# Patient Record
Sex: MALE | Race: WHITE | NOT HISPANIC OR LATINO | URBAN - METROPOLITAN AREA
[De-identification: names, ages, dates, MRNs, and addresses within clinical notes are randomized per-mention and may not be internally consistent; named-entity substitution may affect disease eponyms.]

---

## 2017-01-04 ENCOUNTER — OFFICE VISIT (OUTPATIENT)
Dept: FAMILY MEDICINE | Facility: CLINIC | Age: 57
End: 2017-01-04
Payer: COMMERCIAL

## 2017-01-04 ENCOUNTER — RADIANT APPOINTMENT (OUTPATIENT)
Dept: GENERAL RADIOLOGY | Facility: CLINIC | Age: 57
End: 2017-01-04
Attending: FAMILY MEDICINE
Payer: COMMERCIAL

## 2017-01-04 VITALS
WEIGHT: 249 LBS | BODY MASS INDEX: 36.88 KG/M2 | DIASTOLIC BLOOD PRESSURE: 80 MMHG | HEART RATE: 68 BPM | SYSTOLIC BLOOD PRESSURE: 156 MMHG | TEMPERATURE: 97.1 F | RESPIRATION RATE: 12 BRPM | HEIGHT: 69 IN

## 2017-01-04 DIAGNOSIS — Z71.89 ADVANCED DIRECTIVES, COUNSELING/DISCUSSION: ICD-10-CM

## 2017-01-04 DIAGNOSIS — I87.2 VENOUS STASIS DERMATITIS OF RIGHT LOWER EXTREMITY: Primary | ICD-10-CM

## 2017-01-04 DIAGNOSIS — F17.200 TOBACCO USE DISORDER: ICD-10-CM

## 2017-01-04 DIAGNOSIS — I10 UNCONTROLLED HYPERTENSION: ICD-10-CM

## 2017-01-04 LAB
ALBUMIN SERPL-MCNC: 3.4 G/DL (ref 3.4–5)
ALBUMIN UR-MCNC: NEGATIVE MG/DL
ALP SERPL-CCNC: 90 U/L (ref 40–150)
ALT SERPL W P-5'-P-CCNC: 37 U/L (ref 0–70)
ANION GAP SERPL CALCULATED.3IONS-SCNC: 9 MMOL/L (ref 3–14)
APPEARANCE UR: CLEAR
AST SERPL W P-5'-P-CCNC: 26 U/L (ref 0–45)
BILIRUB SERPL-MCNC: 0.2 MG/DL (ref 0.2–1.3)
BILIRUB UR QL STRIP: NEGATIVE
BUN SERPL-MCNC: 21 MG/DL (ref 7–30)
CALCIUM SERPL-MCNC: 8.6 MG/DL (ref 8.5–10.1)
CHLORIDE SERPL-SCNC: 103 MMOL/L (ref 94–109)
CO2 SERPL-SCNC: 27 MMOL/L (ref 20–32)
COLOR UR AUTO: YELLOW
CREAT SERPL-MCNC: 1.07 MG/DL (ref 0.66–1.25)
ERYTHROCYTE [DISTWIDTH] IN BLOOD BY AUTOMATED COUNT: 12.7 % (ref 10–15)
GFR SERPL CREATININE-BSD FRML MDRD: 71 ML/MIN/1.7M2
GLUCOSE SERPL-MCNC: 89 MG/DL (ref 70–99)
GLUCOSE UR STRIP-MCNC: NEGATIVE MG/DL
HCT VFR BLD AUTO: 42.4 % (ref 40–53)
HGB BLD-MCNC: 13.9 G/DL (ref 13.3–17.7)
HGB UR QL STRIP: NEGATIVE
KETONES UR STRIP-MCNC: NEGATIVE MG/DL
LEUKOCYTE ESTERASE UR QL STRIP: NEGATIVE
MCH RBC QN AUTO: 29.7 PG (ref 26.5–33)
MCHC RBC AUTO-ENTMCNC: 32.8 G/DL (ref 31.5–36.5)
MCV RBC AUTO: 91 FL (ref 78–100)
NITRATE UR QL: NEGATIVE
PH UR STRIP: 7 PH (ref 5–7)
PLATELET # BLD AUTO: 234 10E9/L (ref 150–450)
POTASSIUM SERPL-SCNC: 4.1 MMOL/L (ref 3.4–5.3)
PROT SERPL-MCNC: 6.9 G/DL (ref 6.8–8.8)
RBC # BLD AUTO: 4.68 10E12/L (ref 4.4–5.9)
SODIUM SERPL-SCNC: 139 MMOL/L (ref 133–144)
SP GR UR STRIP: 1.02 (ref 1–1.03)
TSH SERPL DL<=0.005 MIU/L-ACNC: 1.63 MU/L (ref 0.4–4)
URN SPEC COLLECT METH UR: NORMAL
UROBILINOGEN UR STRIP-ACNC: 0.2 EU/DL (ref 0.2–1)
WBC # BLD AUTO: 6.3 10E9/L (ref 4–11)

## 2017-01-04 PROCEDURE — 71020 XR CHEST 2 VW: CPT

## 2017-01-04 PROCEDURE — 99000 SPECIMEN HANDLING OFFICE-LAB: CPT | Performed by: FAMILY MEDICINE

## 2017-01-04 PROCEDURE — 99214 OFFICE O/P EST MOD 30 MIN: CPT | Performed by: FAMILY MEDICINE

## 2017-01-04 PROCEDURE — 85027 COMPLETE CBC AUTOMATED: CPT | Performed by: FAMILY MEDICINE

## 2017-01-04 PROCEDURE — 80053 COMPREHEN METABOLIC PANEL: CPT | Performed by: FAMILY MEDICINE

## 2017-01-04 PROCEDURE — 36415 COLL VENOUS BLD VENIPUNCTURE: CPT | Performed by: FAMILY MEDICINE

## 2017-01-04 PROCEDURE — 93000 ELECTROCARDIOGRAM COMPLETE: CPT | Performed by: FAMILY MEDICINE

## 2017-01-04 PROCEDURE — 84443 ASSAY THYROID STIM HORMONE: CPT | Performed by: FAMILY MEDICINE

## 2017-01-04 PROCEDURE — 81003 URINALYSIS AUTO W/O SCOPE: CPT | Performed by: FAMILY MEDICINE

## 2017-01-04 RX ORDER — HYDROCHLOROTHIAZIDE 12.5 MG/1
12.5 TABLET ORAL DAILY
Qty: 30 TABLET | Refills: 0 | Status: SHIPPED | OUTPATIENT
Start: 2017-01-04 | End: 2017-01-23

## 2017-01-04 NOTE — PATIENT INSTRUCTIONS
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  Schedule ultrasound appointment to study the blood vessels in the legs.  Wear your compression stockings all day and night.  Smoking plays a big role in your condition; stop smoking soon!    Your blood pressure today is uncontrolled.  Due to this medical treatment should be started to prevent complications.  Start hydrochlorothiazide 12.5 mg one tablet daily  Blood, urine, chest xray and EKG tests are ordered as baseline.  Low salt, low fat diet. Exercise as tolerated 30 mins per day 3 days a week.  Take meds as prescribed; call if with side effects.   Follow up in clinic in 2 weeks for blood pressure recheck.    Start Aspirin 81 mg daily.            Thank you for choosing [facility].  You may be receiving a survey in the mail from [employer] regarding your visit today.  Please take a few minutes to complete and return the survey to let us know how we are doing.      If you have questions or concerns, please contact us via [employer] or you can contact your care team at [phone].    Our Clinic hours are:  Monday 6:40 am  to 7:00 pm  Tuesday -Friday 6:40 am to 5:00 pm    The [place] outpatient lab hours are:  Monday - Friday 6:10 am to 4:45 pm  Saturdays 7:00 am to 11:00 am  Appointments are required, call [phone]    If you have clinical questions after hours or would like to schedule an appointment,  call the clinic at [phone].    Patient Education    Hydrochlorothiazide Oral capsule    Hydrochlorothiazide Oral tablet  Hydrochlorothiazide Oral capsule  What is this medicine?  HYDROCHLOROTHIAZIDE (ronny droe klor oh THYE a zide) is a diuretic. It increases the amount of urine passed, which causes the body to lose salt and water. This medicine is used to treat high blood pressure. It is also reduces the swelling and water retention caused by various medical conditions, such as heart, liver, or  kidney disease.  This medicine may be used for other purposes; ask your health care provider or pharmacist if you have questions.  What should I tell my health care provider before I take this medicine?  They need to know if you have any of these conditions:    diabetes    gout    immune system problems, like lupus    kidney disease or kidney stones    liver disease    pancreatitis    small amount of urine or difficulty passing urine    an unusual or allergic reaction to hydrochlorothiazide, sulfa drugs, other medicines, foods, dyes, or preservatives    pregnant or trying to get pregnant    breast-feeding  How should I use this medicine?  Take this medicine by mouth with a glass of water. Follow the directions on the prescription label. Take your medicine at regular intervals. Remember that you will need to pass urine frequently after taking this medicine. Do not take your doses at a time of day that will cause you problems. Do not stop taking your medicine unless your doctor tells you to.  Talk to your pediatrician regarding the use of this medicine in children. Special care may be needed.  Overdosage: If you think you have taken too much of this medicine contact a poison control center or emergency room at once.  NOTE: This medicine is only for you. Do not share this medicine with others.  What if I miss a dose?  If you miss a dose, take it as soon as you can. If it is almost time for your next dose, take only that dose. Do not take double or extra doses.  What may interact with this medicine?    cholestyramine    colestipol    digoxin    dofetilide    lithium    medicines for blood pressure    medicines for diabetes    medicines that relax muscles for surgery    other diuretics    steroid medicines like prednisone or cortisone  This list may not describe all possible interactions. Give your health care provider a list of all the medicines, herbs, non-prescription drugs, or dietary supplements you use. Also tell  them if you smoke, drink alcohol, or use illegal drugs. Some items may interact with your medicine.  What should I watch for while using this medicine?  Visit your doctor or health care professional for regular checks on your progress. Check your blood pressure as directed. Ask your doctor or health care professional what your blood pressure should be and when you should contact him or her.  You may need to be on a special diet while taking this medicine. Ask your doctor.  Check with your doctor or health care professional if you get an attack of severe diarrhea, nausea and vomiting, or if you sweat a lot. The loss of too much body fluid can make it dangerous for you to take this medicine.  You may get drowsy or dizzy. Do not drive, use machinery, or do anything that needs mental alertness until you know how this medicine affects you. Do not stand or sit up quickly, especially if you are an older patient. This reduces the risk of dizzy or fainting spells. Alcohol may interfere with the effect of this medicine. Avoid alcoholic drinks.  This medicine may affect your blood sugar level. If you have diabetes, check with your doctor or health care professional before changing the dose of your diabetic medicine.  This medicine can make you more sensitive to the sun. Keep out of the sun. If you cannot avoid being in the sun, wear protective clothing and use sunscreen. Do not use sun lamps or tanning beds/booths.  What side effects may I notice from receiving this medicine?  Side effects that you should report to your doctor or health care professional as soon as possible:    allergic reactions such as skin rash or itching, hives, swelling of the lips, mouth, tongue, or throat    changes in vision    chest pain    eye pain    fast or irregular heartbeat    feeling faint or lightheaded, falls    gout attack    muscle pain or cramps    pain or difficulty when passing urine    pain, tingling, numbness in the hands or  feet    redness, blistering, peeling or loosening of the skin, including inside the mouth    unusually weak or tired  Side effects that usually do not require medical attention (report to your doctor or health care professional if they continue or are bothersome):    change in sex drive or performance    dry mouth    headache    stomach upset  This list may not describe all possible side effects. Call your doctor for medical advice about side effects. You may report side effects to FDA at 5-519-OES-7342.  Where should I keep my medicine?  Keep out of the reach of children.  Store at room temperature between 15 and 30 degrees C (59 and 86 degrees F). Do not freeze. Protect from light and moisture. Keep container closed tightly. Throw away any unused medicine after the expiration date.  NOTE:This sheet is a summary. It may not cover all possible information. If you have questions about this medicine, talk to your doctor, pharmacist, or health care provider. Copyright  2016 Gold Standard        Low-Salt Diet (2 Grams/Day)  This diet eliminates foods that are high in salt and restricts the amount of salt that you cook with. It is most often used for patients with high blood pressure, edema (fluid retention), kidney, liver, and heart disease.  Table salt contains the mineral sodium. The body needs sodium to work normally. But too much sodium can make your health problems worse. Your health care provider is recommending a low-salt (also called low-sodium) diet for you. Your total daily allowance of salt (sodium) is 2 grams. This equals 2,000 milligrams (mg). It is less than 1 teaspoon of table salt. This means you can have only about 700 mg of sodium at each meal.  When you cook, limit the salt you use. And if you can avoid using salt, even better. Do not add salt at the table. So, throw away the saltshaker!  When shopping, read the package labels. Salt is often called  sodium  on the label. Choose foods that are  salt-free, low salt, or very low salt. Note that foods with reduced salt or reduced sodium may not lower your salt intake enough.    Beverages  Ok: Tea, coffee, carbonated beverages, juices  Avoid: Flavored international coffees, electrolyte replacement drinks, sports beverages  Bread and cereals  Ok: Low sodium bread and rolls, cereals, cakes; low-salt crackers, matzo crackers  Avoid: Salted crackers, pretzels, popcorn; Bulgarian toast, pancakes, muffins  Desserts  Ok: Ice cream, frozen yogurt, juice bars, gelatin, cookies and pies, sugar, honey, jelly, hard candy  Avoid: Most pies, cakes and cookies prepared or processed with salt, instant pudding  Meats  Ok: All fresh meat, fish, poultry, low-salt tuna, eggs, egg substitute  Avoid: Smoked, pickled, brine-cured, or salted meats and fish. This includes tian, chipped beef, corned beef, hot dogs, luncheon meats, ham, kosher meats, salt pork, sausage, canned tuna, salted codfish, smoked salmon, herring, sardines, or anchovies.  Dairy  Ok: Milk, chocolate milk, hot chocolate mix,  low-salt  cheeses, and yogurt  Avoid: Processed cheese, cheese spreads, Roquefort, Camembert, and cottage cheese, buttermilk, instant breakfast drink  Beans, potatoes, and pasta  Ok: Dry beans, split peas, lentils, potatoes, rice, macaroni, pasta, spaghetti without added salt  Avoid: Potato chips, tortilla chips, and similar products  Soups  Ok: Low-salt soups and broths made with allowed foods  Avoid: Bouillon cubes, soups with smoked or salted meats, regular soup and broth  Vegetables  Ok: Most are okay; low-salt tomato and vegetable juices  Avoid: Sauerkraut and other brine-soaked vegetables, pickles and other pickled vegetables, tomato juice, olives  Seasoning and spices  Ok: Most seasonings are okay. Good substitutes for salt include: fresh herb blends, hot sauce, lemon, garlic, harrison, vinegar, dry mustard, parsley, cilantro, horseradish, tomato paste, regular margarine, mayonnaise,  butter, cream cheese, vegetable oil, cream, low-salt salad dressing and gravy  Avoid: Regular ketchup, relishes, pickles, soy sauce, teriyaki sauce, Worcestershire sauce, BBQ sauce, tartar sauce, meat tenderizer, chili sauce, regular gravy, regular salad dressing    9777-5582 [employer]. [address] [place] [address], [place] [address]. All rights reserved. This information is not intended as a substitute for professional medical care. Always follow your healthcare professional's instructions.

## 2017-01-04 NOTE — MR AVS SNAPSHOT
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  Schedule ultrasound appointment to study the blood vessels in the legs.  Wear your compression stockings all day and night.  Smoking plays a big role in your condition; stop smoking soon!    Your blood pressure today is uncontrolled.  Due to this medical treatment should be started to prevent complications.  Start hydrochlorothiazide 12.5 mg one tablet daily  Blood, urine, chest xray and EKG tests are ordered as baseline.  Low salt, low fat diet. Exercise as tolerated 30 mins per day 3 days a week.  Take meds as prescribed; call if with side effects.   Follow up in clinic in 2 weeks for blood pressure recheck.    Start Aspirin 81 mg daily.            Thank you for choosing [facility].  You may be receiving a survey in the mail from [facility] regarding your visit today.  Please take a few minutes to complete and return the survey to let us know how we are doing.      If you have questions or concerns, please contact us via [employer] or you can contact your care team at [phone].    Our Clinic hours are:  Monday 6:40 am  to 7:00 pm  Tuesday -Friday 6:40 am to 5:00 pm    The [place] outpatient lab hours are:  Monday - Friday 6:10 am to 4:45 pm  Saturdays 7:00 am to 11:00 am  Appointments are required, call [phone]    If you have clinical questions after hours or would like to schedule an  appointment,  call the clinic at [phone].          Follow-ups after your visit        Additional Services     PHYSICAL THERAPY REFERRAL       *This therapy referral will be filtered to a centralized scheduling office at [facility] and the patient will receive a call to schedule an appointment at a [place] location most convenient for them. *     [facility] provides Physical Therapy evaluation and treatment and many specialty services across the [place] system.  If requesting a specialty program, please choose from the list below.    If you have not heard from the scheduling office within 2 business days, please call [phone] for all locations, with the exception of [place], please call [phone].  Treatment: Evaluation & Treatment  Special Instructions/Modalities: treat chronic venous stasis changes and lymphedema on BLE.  Special Programs: [facility]    Please be aware that coverage of these services is subject to the terms and limitations of your health insurance plan.  Call member services at your health plan with any benefit or coverage questions.      **Note to Provider:  If you are referring outside of [place] for the therapy appointment, please list the name of the location in the  special instructions  above, print the referral and give to the patient to schedule the appointment.                  Future tests that were ordered for you today     Open Future Orders        Priority Expected Expires Ordered    US Venous Competency Bilateral Routine  1/4/2018 1/4/2017    US KAMILLE Doppler No Exercise Routine  1/4/2018 1/4/2017            Who to contact     If you have questions or need follow up information about today's clinic visit or your schedule please contact [facility] directly at [phone].  Normal or non-critical lab and imaging results will be communicated to you by MyChart, letter or phone within 4 business days after  "the clinic has received the results. If you do not hear from us within 7 days, please contact the clinic through [employer] or phone. If you have a critical or abnormal lab result, we will notify you by phone as soon as possible.  Submit refill requests through [employer] or call your pharmacy and they will forward the refill request to us. Please allow 3 business days for your refill to be completed.          Additional Information About Your Visit        [employer] Information     [employer] lets you send messages to your doctor, view your test results, renew your prescriptions, schedule appointments and more. To sign up, go to [email or web address] . Click on \"Log in\" on the left side of the screen, which will take you to the Welcome page. Then click on \"Sign up Now\" on the right side of the page.     You will be asked to enter the access code listed below, as well as some personal information. Please follow the directions to create your username and password.     Your access code is: [email or web address]  Expires: 2017  9:49 AM     Your access code will  in 90 days. If you need help or a new code, please call your [place] clinic or [phone].        Care EveryWhere ID     This is your Care EveryWhere ID. This could be used by other organizations to access your [place] medical records  [id number]        Your Vitals Were     Pulse Temperature Respirations Height BMI (Body Mass Index)       68 97.1  F (36.2  C) (Tympanic) 12 5' 9\" (1.753 m) 36.75 kg/m2        Blood Pressure from Last 3 Encounters:   17 156/80   10/24/16 136/88   10/14/16 138/80    Weight from Last 3 Encounters:   17 249 lb (112.946 kg)   10/24/16 246 lb (111.585 kg)   10/14/16 250 lb (113.399 kg)              We Performed the Following     *UA reflex to Microscopic     CBC with platelets     Comprehensive metabolic panel     EKG 12-lead complete w/read - Clinics     PHYSICAL THERAPY REFERRAL     TSH with free T4 reflex     XR Chest 2 " Views          Today's Medication Changes          These changes are accurate as of: 1/4/17  9:49 AM.  If you have any questions, ask your nurse or doctor.               Start taking these medicines.        Dose/Directions    aspirin 81 MG tablet   Used for:  Uncontrolled hypertension, Tobacco use disorder   Started by:  Bean Whalen MD        Dose:  81 mg   Take 1 tablet (81 mg) by mouth daily   Quantity:  90 tablet   Refills:  3       hydrochlorothiazide 12.5 MG Tabs tablet   Used for:  Uncontrolled hypertension   Started by:  Bean Whalen MD        Dose:  12.5 mg   Take 1 tablet (12.5 mg) by mouth daily   Quantity:  30 tablet   Refills:  0         Stop taking these medicines if you haven't already. Please contact your care team if you have questions.      MG tablet   Generic drug:  ibuprofen   Stopped by:  Bean Whalen MD                Where to get your medicines      These medications were sent to [facility] [address] [facility]  [address] [facility] [address]     Phone:  [phone]    - aspirin 81 MG tablet  - hydrochlorothiazide 12.5 MG Tabs tablet             Primary Care Provider Office Phone # Fax #    Liborio Mercer [phone]       [address] [address]        Thank you!     Thank you for choosing [facility]  for your care. Our goal is always to provide you with excellent care. Hearing back from our patients is one way we can continue to improve our services. Please take a few minutes to complete the written survey that you may receive in the mail after your visit with us. Thank you!             Your Updated Medication List - Protect others around you: Learn how to safely use, store and throw away your medicines at www.disposemymeds.org.          This list is accurate as of: 1/4/17  9:49 AM.  Always use your most recent med list.                    Brand Name Dispense Instructions for use    aspirin 81 MG tablet     90 tablet    Take 1 tablet (81 mg) by mouth daily       hydrochlorothiazide 12.5 MG Tabs tablet     30 tablet    Take 1 tablet (12.5 mg) by mouth daily       order for DME     1 Units    Equipment being ordered: compression stockings above knee 1 pair

## 2017-01-04 NOTE — NURSING NOTE
"Chief Complaint   Patient presents with     Infection     Infection right lower leg, cellulitis 10/2016, past couple of weeks starting getting worse again, red, inflammed, warm to the touch, swollen, does have sore on left leg, burning sensation occasionally at site of sore.  ? left leg infection also.  No known fever.  Does wear his compression stockings occasioanlly.       Initial /82 mmHg  Pulse 68  Temp(Src) 97.1  F (36.2  C) (Tympanic)  Ht 5' 9\" (1.753 m)  Wt 249 lb (112.946 kg)  BMI 36.75 kg/m2 Estimated body mass index is 36.75 kg/(m^2) as calculated from the following:    Height as of this encounter: 5' 9\" (1.753 m).    Weight as of this encounter: 249 lb (112.946 kg).  BP completed using cuff size: large  "

## 2017-01-04 NOTE — PROGRESS NOTES
"  SUBJECTIVE:                                                    Liborio Medellin is a 56 year old male who presents to clinic today for the following health issues:      Chief Complaint   Patient presents with     Infection     Infection right lower leg, cellulitis 10/2016, past couple of weeks starting getting worse again, red, inflammed, warm to the touch, swollen, does have sore on left leg, burning sensation occasionally at site of sore.  ? left leg infection also.  No known fever.  Does wear his compression stockings occasioanlly.     Health Maintenance     colonosocpy order placed in Oct, patient does have prep         Musculoskeletal problem/pain      Duration: 3 months    Description  Location: right lower leg  Treated for cellulitis in 9/2016 into 10/2016.  Patient states the redness and the open sores on the leg improved but one sore persisted since then.  The redness of skin was never completely resolved.  Patient has been wearing compression stockings intermittently since then.  3 weeks ago, patient has started to have \"more swelling and more redness\" on the right lower leg.  Patient denies fever, chills or left leg symptoms.    Intensity:  mild, moderate    Accompanying signs and symptoms: 'burning sensation just where the sore is\" on the RLE    History  Previous similar problem: YES- see above  Previous evaluation:  No imaging to date    Precipitating or alleviating factors:  Trauma or overuse: no   Aggravating factors include: none    Therapies tried and outcome: in last 3 weeks, no specific treatments.   Verified above history with patient.      Problem list and histories reviewed & adjusted, as indicated.  Additional history: as documented    Patient Active Problem List   Diagnosis     Cellulitis due to MRSA     Advanced directives, counseling/discussion     Tobacco use disorder     Uncontrolled hypertension     History reviewed. No pertinent past surgical history.    Social History   Substance " "Use Topics     Smoking status: Current Some Day Smoker     Types: Cigarettes     Smokeless tobacco: Not on file      Comment: Less than 1 pp week.     Alcohol Use: Yes      Comment: Off and on-weekends.     Family History   Problem Relation Age of Onset     Coronary Artery Disease Father      bypass     GASTROINTESTINAL DISEASE Father      Other Cancer Maternal Grandmother      Coronary Artery Disease Paternal Grandmother          Current Outpatient Prescriptions   Medication Sig Dispense Refill     hydrochlorothiazide 12.5 MG TABS tablet Take 1 tablet (12.5 mg) by mouth daily 30 tablet 0     aspirin 81 MG tablet Take 1 tablet (81 mg) by mouth daily 90 tablet 3     order for DME Equipment being ordered: compression stockings above knee 1 pair 1 Units 1     No Known Allergies    ROS:  C: NEGATIVE for fever, chills, change in weight  INTEGUMENTARY/SKIN: see above  R: NEGATIVE for significant cough or SOB  CV: NEGATIVE for chest pain, palpitations or peripheral edema  MUSCULOSKELETAL:see above.  N: NEGATIVE for weakness, dizziness or paresthesias  H: NEGATIVE for bleeding problems    OBJECTIVE:                                                    /80 mmHg  Pulse 68  Temp(Src) 97.1  F (36.2  C) (Tympanic)  Resp 12  Ht 5' 9\" (1.753 m)  Wt 249 lb (112.946 kg)  BMI 36.75 kg/m2  Body mass index is 36.75 kg/(m^2).  GENERAL: obese, alert and no distress, ambulatory w/o assist  NECK: no tenderness, no adenopathy,  Thyroid not enlarged  RESP: lungs clear to auscultation - no rales, no rhonchi, no wheezes  CV: regular rates and rhythm, no murmur  MS: bilateral mildly pitting edema up to distal 3rd of lower leg; right lower leg with anterolateral dry non-oozing irregular sized superficial ulcer with surrounding irregular shaped erythema with no warmth to touch nor tenderness on palpation; no Yuly's sign either side.  SKIN: see above; no skin changes on the left leg; no other rash/ulcer  ABD: protuberant,  " nontender  PERIPHERAL PULSES: right pedal pulse grade 1, left pedal pulse 2+    Diagnostic test results:  Diagnostic Test Results:  none      ASSESSMENT/PLAN:                                                        ICD-10-CM    1. Venous stasis dermatitis of right lower extremity I83.11 US Venous Competency Bilateral     US KAMILLE Doppler No Exercise     PHYSICAL THERAPY REFERRAL  Discussed with patient exam today consistent with venous stasis changes rather than cellulitis.  Discussed evaluation; patient concurred.  Lymphedema clinic referral.  Compression stockings to both legs daily.  Low salt diet to prevent further edema.  Return precautions discussed and given to patient.     2. Uncontrolled hypertension I10 hydrochlorothiazide 12.5 MG TABS tablet     Comprehensive metabolic panel     TSH with free T4 reflex     CBC with platelets     EKG 12-lead complete w/read - Clinics     XR Chest 2 Views     *UA reflex to Microscopic     aspirin 81 MG tablet  Discussed causes, course, complications and treatment of condition.  Low salt, low fat diet.   Exercise as tolerated 30 mins per day 3 days a week to start with.  Counseled on different meds; patient agreed to above recommended med.  Take meds as prescribed; call if with side effects. .  Return precautions given.     3. Tobacco use disorder F17.200 aspirin 81 MG tablet  Advised risks of habit, including aggravation or causing the above conditions.  Advised patient to stop soon; he is not interested at this time.     4. 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  Schedule ultrasound appointment to study the blood vessels in the legs.  Wear your compression stockings all day and night.  Smoking plays a big role in your condition; stop smoking soon!    Your  blood pressure today is uncontrolled.  Due to this medical treatment should be started to prevent complications.  Start hydrochlorothiazide 12.5 mg one tablet daily  Blood, urine, chest xray and EKG tests are ordered as baseline.  Low salt, low fat diet. Exercise as tolerated 30 mins per day 3 days a week.  Take meds as prescribed; call if with side effects.   Follow up in clinic in 2 weeks for blood pressure recheck.    Start Aspirin 81 mg daily.            Thank you for choosing [facility].  You may be receiving a survey in the mail from [employer]neud[employer] Kapoor regarding your visit today.  Please take a few minutes to complete and return the survey to let us know how we are doing.      If you have questions or concerns, please contact us via [facility] [employer] or you can contact your care team at [phone].    Our Clinic hours are:  Monday 6:40 am  to 7:00 pm  Tuesday -Friday 6:40 am to 5:00 pm    The [place] outpatient lab hours are:  Monday - Friday 6:10 am to 4:45 pm  Saturdays 7:00 am to 11:00 am  Appointments are required, call [phone]    If you have clinical questions after hours or would like to schedule an appointment,  call the clinic at [phone].    Patient Education    Hydrochlorothiazide Oral capsule    Hydrochlorothiazide Oral tablet  Hydrochlorothiazide Oral capsule  What is this medicine?  HYDROCHLOROTHIAZIDE (ronny droe klor oh THYE a zide) is a diuretic. It increases the amount of urine passed, which causes the body to lose salt and water. This medicine is used to treat high blood pressure. It is also reduces the swelling and water retention caused by various medical conditions, such as heart, liver, or kidney disease.  This medicine may be used for other purposes; ask your health care provider or pharmacist if you have questions.  What should I tell my health care provider before I take this medicine?  They need to know if you have any of these conditions:    diabetes    gout    immune system problems,  like lupus    kidney disease or kidney stones    liver disease    pancreatitis    small amount of urine or difficulty passing urine    an unusual or allergic reaction to hydrochlorothiazide, sulfa drugs, other medicines, foods, dyes, or preservatives    pregnant or trying to get pregnant    breast-feeding  How should I use this medicine?  Take this medicine by mouth with a glass of water. Follow the directions on the prescription label. Take your medicine at regular intervals. Remember that you will need to pass urine frequently after taking this medicine. Do not take your doses at a time of day that will cause you problems. Do not stop taking your medicine unless your doctor tells you to.  Talk to your pediatrician regarding the use of this medicine in children. Special care may be needed.  Overdosage: If you think you have taken too much of this medicine contact a poison control center or emergency room at once.  NOTE: This medicine is only for you. Do not share this medicine with others.  What if I miss a dose?  If you miss a dose, take it as soon as you can. If it is almost time for your next dose, take only that dose. Do not take double or extra doses.  What may interact with this medicine?    cholestyramine    colestipol    digoxin    dofetilide    lithium    medicines for blood pressure    medicines for diabetes    medicines that relax muscles for surgery    other diuretics    steroid medicines like prednisone or cortisone  This list may not describe all possible interactions. Give your health care provider a list of all the medicines, herbs, non-prescription drugs, or dietary supplements you use. Also tell them if you smoke, drink alcohol, or use illegal drugs. Some items may interact with your medicine.  What should I watch for while using this medicine?  Visit your doctor or health care professional for regular checks on your progress. Check your blood pressure as directed. Ask your doctor or health care  professional what your blood pressure should be and when you should contact him or her.  You may need to be on a special diet while taking this medicine. Ask your doctor.  Check with your doctor or health care professional if you get an attack of severe diarrhea, nausea and vomiting, or if you sweat a lot. The loss of too much body fluid can make it dangerous for you to take this medicine.  You may get drowsy or dizzy. Do not drive, use machinery, or do anything that needs mental alertness until you know how this medicine affects you. Do not stand or sit up quickly, especially if you are an older patient. This reduces the risk of dizzy or fainting spells. Alcohol may interfere with the effect of this medicine. Avoid alcoholic drinks.  This medicine may affect your blood sugar level. If you have diabetes, check with your doctor or health care professional before changing the dose of your diabetic medicine.  This medicine can make you more sensitive to the sun. Keep out of the sun. If you cannot avoid being in the sun, wear protective clothing and use sunscreen. Do not use sun lamps or tanning beds/booths.  What side effects may I notice from receiving this medicine?  Side effects that you should report to your doctor or health care professional as soon as possible:    allergic reactions such as skin rash or itching, hives, swelling of the lips, mouth, tongue, or throat    changes in vision    chest pain    eye pain    fast or irregular heartbeat    feeling faint or lightheaded, falls    gout attack    muscle pain or cramps    pain or difficulty when passing urine    pain, tingling, numbness in the hands or feet    redness, blistering, peeling or loosening of the skin, including inside the mouth    unusually weak or tired  Side effects that usually do not require medical attention (report to your doctor or health care professional if they continue or are bothersome):    change in sex drive or performance    dry  mouth    headache    stomach upset  This list may not describe all possible side effects. Call your doctor for medical advice about side effects. You may report side effects to FDA at 4-366-DXH-1682.  Where should I keep my medicine?  Keep out of the reach of children.  Store at room temperature between 15 and 30 degrees C (59 and 86 degrees F). Do not freeze. Protect from light and moisture. Keep container closed tightly. Throw away any unused medicine after the expiration date.  NOTE:This sheet is a summary. It may not cover all possible information. If you have questions about this medicine, talk to your doctor, pharmacist, or health care provider. Copyright  2016 [employer] Standard        Low-Salt Diet (2 Grams/Day)  This diet eliminates foods that are high in salt and restricts the amount of salt that you cook with. It is most often used for patients with high blood pressure, edema (fluid retention), kidney, liver, and heart disease.  Table salt contains the mineral sodium. The body needs sodium to work normally. But too much sodium can make your health problems worse. Your health care provider is recommending a low-salt (also called low-sodium) diet for you. Your total daily allowance of salt (sodium) is 2 grams. This equals 2,000 milligrams (mg). It is less than 1 teaspoon of table salt. This means you can have only about 700 mg of sodium at each meal.  When you cook, limit the salt you use. And if you can avoid using salt, even better. Do not add salt at the table. So, throw away the saltshaker!  When shopping, read the package labels. Salt is often called  sodium  on the label. Choose foods that are salt-free, low salt, or very low salt. Note that foods with reduced salt or reduced sodium may not lower your salt intake enough.    Beverages  Ok: Tea, coffee, carbonated beverages, juices  Avoid: Flavored international coffees, electrolyte replacement drinks, sports beverages  Bread and cereals  Ok: Low sodium  bread and rolls, cereals, cakes; low-salt crackers, matzo crackers  Avoid: Salted crackers, pretzels, popcorn; Romanian toast, pancakes, muffins  Desserts  Ok: Ice cream, frozen yogurt, juice bars, gelatin, cookies and pies, sugar, honey, jelly, hard candy  Avoid: Most pies, cakes and cookies prepared or processed with salt, instant pudding  Meats  Ok: All fresh meat, fish, poultry, low-salt tuna, eggs, egg substitute  Avoid: Smoked, pickled, brine-cured, or salted meats and fish. This includes tian, chipped beef, corned beef, hot dogs, luncheon meats, ham, kosher meats, salt pork, sausage, canned tuna, salted codfish, smoked salmon, herring, sardines, or anchovies.  Dairy  Ok: Milk, chocolate milk, hot chocolate mix,  low-salt  cheeses, and yogurt  Avoid: Processed cheese, cheese spreads, Roquefort, Camembert, and cottage cheese, buttermilk, instant breakfast drink  Beans, potatoes, and pasta  Ok: Dry beans, split peas, lentils, potatoes, rice, macaroni, pasta, spaghetti without added salt  Avoid: Potato chips, tortilla chips, and similar products  Soups  Ok: Low-salt soups and broths made with allowed foods  Avoid: Bouillon cubes, soups with smoked or salted meats, regular soup and broth  Vegetables  Ok: Most are okay; low-salt tomato and vegetable juices  Avoid: Sauerkraut and other brine-soaked vegetables, pickles and other pickled vegetables, tomato juice, olives  Seasoning and spices  Ok: Most seasonings are okay. Good substitutes for salt include: fresh herb blends, hot sauce, lemon, garlic, harrison, vinegar, dry mustard, parsley, cilantro, horseradish, tomato paste, regular margarine, mayonnaise, butter, cream cheese, vegetable oil, cream, low-salt salad dressing and gravy  Avoid: Regular ketchup, relishes, pickles, soy sauce, teriyaki sauce, Worcestershire sauce, Q sauce, tartar sauce, meat tenderizer, chili sauce, regular gravy, regular salad dressing    9556-2226 [employer]. [address] [place] [address]  [place] [address]. All rights reserved. This information is not intended as a substitute for professional medical care. Always follow your healthcare professional's instructions.              Bean Whalen MD  [facility]

## 2017-01-23 ENCOUNTER — OFFICE VISIT (OUTPATIENT)
Dept: FAMILY MEDICINE | Facility: CLINIC | Age: 57
End: 2017-01-23
Payer: COMMERCIAL

## 2017-01-23 VITALS
SYSTOLIC BLOOD PRESSURE: 159 MMHG | HEIGHT: 69 IN | DIASTOLIC BLOOD PRESSURE: 90 MMHG | TEMPERATURE: 98.1 F | HEART RATE: 71 BPM | WEIGHT: 245.2 LBS | BODY MASS INDEX: 36.32 KG/M2

## 2017-01-23 DIAGNOSIS — L03.115 CELLULITIS OF RIGHT LOWER LEG: Primary | ICD-10-CM

## 2017-01-23 DIAGNOSIS — I87.2 VENOUS STASIS DERMATITIS OF BOTH LOWER EXTREMITIES: ICD-10-CM

## 2017-01-23 DIAGNOSIS — I10 UNCONTROLLED HYPERTENSION: ICD-10-CM

## 2017-01-23 PROCEDURE — 99214 OFFICE O/P EST MOD 30 MIN: CPT | Performed by: FAMILY MEDICINE

## 2017-01-23 RX ORDER — CLINDAMYCIN HCL 300 MG
300 CAPSULE ORAL 4 TIMES DAILY
Qty: 28 CAPSULE | Refills: 0 | Status: SHIPPED | OUTPATIENT
Start: 2017-01-23 | End: 2017-01-30

## 2017-01-23 RX ORDER — HYDROCHLOROTHIAZIDE 25 MG/1
25 TABLET ORAL DAILY
Qty: 30 TABLET | Refills: 0 | Status: SHIPPED | OUTPATIENT
Start: 2017-01-23 | End: 2017-11-14

## 2017-01-23 NOTE — MR AVS SNAPSHOT
After Visit Summary   1/23/2017    Liborio Medellin    MRN: [id number]           Patient Information     Date Of Birth          1960        Visit Information        Provider Department      1/23/2017 6:00 PM Bean Whalen MD [facility]        Today's Diagnoses     Cellulitis of right lower leg    -  1     Uncontrolled hypertension           Care Instructions    Start clindamycin 300 mg 4 x a day for at least 7 days.  Follow up in clinic in 2-3 days if you are able to; if unable to come in before Friday, see a provider in clinic on Friday. Make sure to schedule this appointment.  Reschedule lymphedema appointment.  Elevate both legs when resting.      Your blood pressure today is uncontrolled.  Increase hydrochlorothiazide to 25 mg once a day.  Low salt, low fat diet. Exercise as tolerated 30 mins per day 3 days a week.  Take meds as prescribed; call if with side effects.   Blood pressure will be rechecked on your next follow up visit.      Thank you for choosing [facility].  You may be receiving a survey in the mail from [employer]ne[employer] Kapoor regarding your visit today.  Please take a few minutes to complete and return the survey to let us know how we are doing.      If you have questions or concerns, please contact us via [employer] or you can contact your care team at [phone].    Our Clinic hours are:  Monday 6:40 am  to 7:00 pm  Tuesday -Friday 6:40 am to 5:00 pm    The [place] outpatient lab hours are:  Monday - Friday 6:10 am to 4:45 pm  Saturdays 7:00 am to 11:00 am  Appointments are required, call [phone]    If you have clinical questions after hours or would like to schedule an appointment,  call the clinic at [phone].    Low-Salt Diet (2 Grams/Day)  This diet eliminates foods that are high in salt and restricts the amount of salt that you cook with. It is most often used for patients with high blood pressure, edema (fluid retention), kidney, liver, and  heart disease.  Table salt contains the mineral sodium. The body needs sodium to work normally. But too much sodium can make your health problems worse. Your health care provider is recommending a low-salt (also called low-sodium) diet for you. Your total daily allowance of salt (sodium) is 2 grams. This equals 2,000 milligrams (mg). It is less than 1 teaspoon of table salt. This means you can have only about 700 mg of sodium at each meal.  When you cook, limit the salt you use. And if you can avoid using salt, even better. Do not add salt at the table. So, throw away the saltshaker!  When shopping, read the package labels. Salt is often called  sodium  on the label. Choose foods that are salt-free, low salt, or very low salt. Note that foods with reduced salt or reduced sodium may not lower your salt intake enough.    Beverages  Ok: Tea, coffee, carbonated beverages, juices  Avoid: Flavored international coffees, electrolyte replacement drinks, sports beverages  Bread and cereals  Ok: Low sodium bread and rolls, cereals, cakes; low-salt crackers, matzo crackers  Avoid: Salted crackers, pretzels, popcorn; [place] toast, pancakes, muffins  Desserts  Ok: Ice cream, frozen yogurt, juice bars, gelatin, cookies and pies, sugar, honey, jelly, hard candy  Avoid: Most pies, cakes and cookies prepared or processed with salt, instant pudding  Meats  Ok: All fresh meat, fish, poultry, low-salt tuna, eggs, egg substitute  Avoid: Smoked, pickled, brine-cured, or salted meats and fish. This includes tian, chipped beef, corned beef, hot dogs, luncheon meats, ham, kosher meats, salt pork, sausage, canned tuna, salted codfish, smoked salmon, herring, sardines, or anchovies.  Dairy  Ok: Milk, chocolate milk, hot chocolate mix,  low-salt  cheeses, and yogurt  Avoid: Processed cheese, cheese spreads, Roquefort, Camembert, and cottage cheese, buttermilk, instant breakfast drink  Beans, potatoes, and pasta  Ok: Dry beans, split peas,  lentils, potatoes, rice, macaroni, pasta, spaghetti without added salt  Avoid: Potato chips, tortilla chips, and similar products  Soups  Ok: Low-salt soups and broths made with allowed foods  Avoid: Bouillon cubes, soups with smoked or salted meats, regular soup and broth  Vegetables  Ok: Most are okay; low-salt tomato and vegetable juices  Avoid: Sauerkraut and other brine-soaked vegetables, pickles and other pickled vegetables, tomato juice, olives  Seasoning and spices  Ok: Most seasonings are okay. Good substitutes for salt include: fresh herb blends, hot sauce, lemon, garlic, harrison, vinegar, dry mustard, parsley, cilantro, horseradish, tomato paste, regular margarine, mayonnaise, butter, cream cheese, vegetable oil, cream, low-salt salad dressing and gravy  Avoid: Regular ketchup, relishes, pickles, soy sauce, teriyaki sauce, Worcestershire sauce, BBQ sauce, tartar sauce, meat tenderizer, chili sauce, regular gravy, regular salad dressing    [id number] [employer] [address] [place] [address], [place] [address]. All rights reserved. This information is not intended as a substitute for professional medical care. Always follow your healthcare professional's instructions.              Follow-ups after your visit        Who to contact     If you have questions or need follow up information about today's clinic visit or your schedule please contact [facility] directly at [phone].  Normal or non-critical lab and imaging results will be communicated to you by MyChart, letter or phone within 4 business days after the clinic has received the results. If you do not hear from us within 7 days, please contact the clinic through MyChart or phone. If you have a critical or abnormal lab result, we will notify you by phone as soon as possible.  Submit refill requests through [employer] or call your pharmacy and they will forward the refill request to us. Please allow 3 business days for your refill to  "be completed.          Additional Information About Your Visit        [employer] Information     [employer] [employer] lets you send messages to your doctor, view your test results, renew your prescriptions, schedule appointments and more. To sign up, go to [email or web address] . Click on \"Log in\" on the left side of the screen, which will take you to the Welcome page. Then click on \"Sign up Now\" on the right side of the page.     You will be asked to enter the access code listed below, as well as some personal information. Please follow the directions to create your username and password.     Your access code is: [email or web address]  Expires: 2017  9:49 AM     Your access code will  in 90 days. If you need help or a new code, please call your [place] clinic or [phone].        Care EveryWhere ID     This is your Care EveryWhere ID. This could be used by other organizations to access your [place] medical records  [id number]        Your Vitals Were     Pulse Temperature Height BMI (Body Mass Index)          71 98.1  F (36.7  C) (Tympanic) 5' 9\" (1.753 m) 36.19 kg/m2         Blood Pressure from Last 3 Encounters:   17 159/90   17 156/80   10/24/16 136/88    Weight from Last 3 Encounters:   17 245 lb 3.2 oz (111.222 kg)   17 249 lb (112.946 kg)   10/24/16 246 lb (111.585 kg)              Today, you had the following     No orders found for display         Today's Medication Changes          These changes are accurate as of: 17  6:56 PM.  If you have any questions, ask your nurse or doctor.               Start taking these medicines.        Dose/Directions    clindamycin 300 MG capsule   Commonly known as:  CLEOCIN   Used for:  Cellulitis of right lower leg   Started by:  Bean Whalen MD        Dose:  300 mg   Take 1 capsule (300 mg) by mouth 4 times daily for 7 days   Quantity:  28 capsule   Refills:  0         These medicines have changed or have updated prescriptions.        " Dose/Directions    hydrochlorothiazide 25 MG tablet   Commonly known as:  HYDRODIURIL   This may have changed:    - medication strength  - how much to take   Used for:  Uncontrolled hypertension   Changed by:  Bean Whalen MD        Dose:  25 mg   Take 1 tablet (25 mg) by mouth daily   Quantity:  30 tablet   Refills:  0            Where to get your medicines      These medications were sent to [facility] - [place], [place] - [address]  [address] [address]     Phone:  [phone]    - clindamycin 300 MG capsule  - hydrochlorothiazide 25 MG tablet             Primary Care Provider Office Phone # Fax #    Liborio Mercer [phone]       [facility] [address] [facility] [address]        Thank you!     Thank you for choosing [facility]  for your care. Our goal is always to provide you with excellent care. Hearing back from our patients is one way we can continue to improve our services. Please take a few minutes to complete the written survey that you may receive in the mail after your visit with us. Thank you!             Your Updated Medication List - Protect others around you: Learn how to safely use, store and throw away your medicines at www.disposemymeds.org.          This list is accurate as of: 1/23/17  6:56 PM.  Always use your most recent med list.                   Brand Name Dispense Instructions for use    aspirin 81 MG tablet     90 tablet    Take 1 tablet (81 mg) by mouth daily       clindamycin 300 MG capsule    CLEOCIN    28 capsule    Take 1 capsule (300 mg) by mouth 4 times daily for 7 days       hydrochlorothiazide 25 MG tablet    HYDRODIURIL    30 tablet    Take 1 tablet (25 mg) by mouth daily       order for DME     1 Units    Equipment being ordered: compression stockings above knee 1 pair

## 2017-01-24 NOTE — NURSING NOTE
"Chief Complaint   Patient presents with     Hypertension     recheck, started medication at last visit.     Musculoskeletal Problem     Follow up venous statis dermatitis, both legs, has gotten worse since last visit, does have a sore on each lower leg, red, inflammed, painful, swelling - tight feeling, warm to the touch, lump.  Sore on right leg started 1 week ago, left leg sore has been there since last visit.         Initial /90 mmHg  Pulse 71  Temp(Src) 98.1  F (36.7  C) (Tympanic)  Ht 5' 9\" (1.753 m)  Wt 245 lb 3.2 oz (111.222 kg)  BMI 36.19 kg/m2 Estimated body mass index is 36.19 kg/(m^2) as calculated from the following:    Height as of this encounter: 5' 9\" (1.753 m).    Weight as of this encounter: 245 lb 3.2 oz (111.222 kg).  BP completed using cuff size: large  "
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
## 2017-01-24 NOTE — PATIENT INSTRUCTIONS
Start clindamycin 300 mg 4 x a day for at least 7 days.  Follow up in clinic in 2-3 days if you are able to; if unable to come in before Friday, see a provider in clinic on Friday. Make sure to schedule this appointment.  Reschedule lymphedema appointment.  Elevate both legs when resting.      Your blood pressure today is uncontrolled.  Increase hydrochlorothiazide to 25 mg once a day.  Low salt, low fat diet. Exercise as tolerated 30 mins per day 3 days a week.  Take meds as prescribed; call if with side effects.   Blood pressure will be rechecked on your next follow up visit.      Thank you for choosing [facility].  You may be receiving a survey in the mail from [employer] regarding your visit today.  Please take a few minutes to complete and return the survey to let us know how we are doing.      If you have questions or concerns, please contact us via [employer] or you can contact your care team at [phone].    Our Clinic hours are:  Monday 6:40 am  to 7:00 pm  Tuesday -Friday 6:40 am to 5:00 pm    The [place] outpatient lab hours are:  Monday - Friday 6:10 am to 4:45 pm  Saturdays 7:00 am to 11:00 am  Appointments are required, call [phone]    If you have clinical questions after hours or would like to schedule an appointment,  call the clinic at [phone].    Low-Salt Diet (2 Grams/Day)  This diet eliminates foods that are high in salt and restricts the amount of salt that you cook with. It is most often used for patients with high blood pressure, edema (fluid retention), kidney, liver, and heart disease.  Table salt contains the mineral sodium. The body needs sodium to work normally. But too much sodium can make your health problems worse. Your health care provider is recommending a low-salt (also called low-sodium) diet for you. Your total daily allowance of salt (sodium) is 2 grams. This equals 2,000 milligrams (mg). It is less than 1 teaspoon of table salt. This means you can have only  about 700 mg of sodium at each meal.  When you cook, limit the salt you use. And if you can avoid using salt, even better. Do not add salt at the table. So, throw away the saltshaker!  When shopping, read the package labels. Salt is often called  sodium  on the label. Choose foods that are salt-free, low salt, or very low salt. Note that foods with reduced salt or reduced sodium may not lower your salt intake enough.    Beverages  Ok: Tea, coffee, carbonated beverages, juices  Avoid: Flavored international coffees, electrolyte replacement drinks, sports beverages  Bread and cereals  Ok: Low sodium bread and rolls, cereals, cakes; low-salt crackers, matzo crackers  Avoid: Salted crackers, pretzels, popcorn; Pashto toast, pancakes, muffins  Desserts  Ok: Ice cream, frozen yogurt, juice bars, gelatin, cookies and pies, sugar, honey, jelly, hard candy  Avoid: Most pies, cakes and cookies prepared or processed with salt, instant pudding  Meats  Ok: All fresh meat, fish, poultry, low-salt tuna, eggs, egg substitute  Avoid: Smoked, pickled, brine-cured, or salted meats and fish. This includes tian, chipped beef, corned beef, hot dogs, luncheon meats, ham, kosher meats, salt pork, sausage, canned tuna, salted codfish, smoked salmon, herring, sardines, or anchovies.  Dairy  Ok: Milk, chocolate milk, hot chocolate mix,  low-salt  cheeses, and yogurt  Avoid: Processed cheese, cheese spreads, Roquefort, Camembert, and cottage cheese, buttermilk, instant breakfast drink  Beans, potatoes, and pasta  Ok: Dry beans, split peas, lentils, potatoes, rice, macaroni, pasta, spaghetti without added salt  Avoid: Potato chips, tortilla chips, and similar products  Soups  Ok: Low-salt soups and broths made with allowed foods  Avoid: Bouillon cubes, soups with smoked or salted meats, regular soup and broth  Vegetables  Ok: Most are okay; low-salt tomato and vegetable juices  Avoid: Sauerkraut and other brine-soaked vegetables, pickles and  other pickled vegetables, tomato juice, olives  Seasoning and spices  Ok: Most seasonings are okay. Good substitutes for salt include: fresh herb blends, hot sauce, lemon, garlic, harrison, vinegar, dry mustard, parsley, cilantro, horseradish, tomato paste, regular margarine, mayonnaise, butter, cream cheese, vegetable oil, cream, low-salt salad dressing and gravy  Avoid: Regular ketchup, relishes, pickles, soy sauce, teriyaki sauce, Worcestershire sauce, BBQ sauce, tartar sauce, meat tenderizer, chili sauce, regular gravy, regular salad dressing    9241-0281 [employer], [email or web address]. [address] [place] [address], [place] [address]. All rights reserved. This information is not intended as a substitute for professional medical care. Always follow your healthcare professional's instructions.

## 2017-01-27 ENCOUNTER — OFFICE VISIT (OUTPATIENT)
Dept: FAMILY MEDICINE | Facility: CLINIC | Age: 57
End: 2017-01-27
Payer: COMMERCIAL

## 2017-01-27 VITALS
BODY MASS INDEX: 36.14 KG/M2 | TEMPERATURE: 97.2 F | HEIGHT: 69 IN | SYSTOLIC BLOOD PRESSURE: 144 MMHG | DIASTOLIC BLOOD PRESSURE: 86 MMHG | HEART RATE: 68 BPM | RESPIRATION RATE: 20 BRPM | WEIGHT: 244 LBS

## 2017-01-27 DIAGNOSIS — I87.2 VENOUS STASIS DERMATITIS OF BOTH LOWER EXTREMITIES: ICD-10-CM

## 2017-01-27 DIAGNOSIS — L03.115 CELLULITIS OF RIGHT LOWER EXTREMITY: Primary | ICD-10-CM

## 2017-01-27 DIAGNOSIS — Z12.11 SCREEN FOR COLON CANCER: ICD-10-CM

## 2017-01-27 DIAGNOSIS — I10 UNCONTROLLED HYPERTENSION: ICD-10-CM

## 2017-01-27 DIAGNOSIS — F17.200 TOBACCO USE DISORDER: ICD-10-CM

## 2017-01-27 PROCEDURE — 99213 OFFICE O/P EST LOW 20 MIN: CPT | Performed by: INTERNAL MEDICINE

## 2017-01-27 NOTE — PATIENT INSTRUCTIONS
"  Thank you for choosing [facility].  You may be receiving a survey in the mail from Aneud[employer] Kapoor regarding your visit today.  Please take a few minutes to complete and return the survey to let us know how we are doing.      If you have questions or concerns, please contact us via [employer] or you can contact your care team at [phone].    Our Clinic hours are:  Monday 6:40 am  to 7:00 pm  Tuesday -Friday 6:40 am to 5:00 pm    The [place] outpatient lab hours are:  Monday - Friday 6:10 am to 4:45 pm  Saturdays 7:00 am to 11:00 am  Appointments are required, call [phone]    If you have clinical questions after hours or would like to schedule an appointment,  call the clinic at [phone].      [facility]      1. Schedule your colonoscopy or return the FIT test  2. Schedule with the Lymphedema clinic  3. Use lots of good thick hydrating cream on the skin (Eucerin, Aveeno, Gold Bond) at least twice daily  4. See Dr. Whalen in 1 month, to check-up on blood pressure.  5. Keep working on quitting smoking  6. Finish antibiotic     How to quit smoking:    Know your reasons for quitting! Remind yourself of these reasons when you struggle with the urge to smoking. Post these reasons in a visible place such as a post-it note on your mirror in the bathroom or on the dash of your car.    Pick a quit date. Set yourself up for success by planning how to be successful. Have a strategy, including having others hold you accountable to your plans to quit.    Tell others you are quitting. Have people hold you accountable -- ask them to challenge you to quit smoking if they see you picking up a cigarette. Don't have them turn a \"blind eye\". The best people to hold you accountable are those who genuinely care about you and are around you frequently.    Remove triggers for smoking. Don't hang out with other smokers, or alternatively band them with you in attempt to quit. If you always smoke while in the car, have a " "strategy in place to deal with the smoking urge that is likely triggered by being in a car.     Throw away all smoking gear -- cigs, lighters, vandana trays. Don't put them in the basement for use \"in case you fail to quit smoking\". No -- make it harder for yourself to start smoking again.    Set up a reward for yourself. This should be a non-food reward. Set aside the money that you are saving by not buying cigarettes, and have fun with it if you have not smoked for 3 months (or whatever your goal is -- it might be longer).     When you feel urge to smoke, set yourself up for success. Remove access to cigarettes -- go for walk without wallet, call a support person, delay acting on urge for 15-30 minutes or more, remind yourself of your motivations for quitting. Ok to use nicotine gum or other nicotine replacement.     Have a replacement activity available for your hands or mouth. You might chew gum (nicotine free or otherwise) or chew toothpick. You might draw or do other activity with hands.     Be gentle with yourself -- if you start smoking, just pick yourself up again and quit again. If you smoke a few cigarettes some day, it doesn't mean the day is a failure (or that you should just smoke all you want on that day). Each cigarette that you don't smoke is a success, and so fight for each success.     If you don't manage to quit smoking on this try, try again!     "

## 2017-01-27 NOTE — PROGRESS NOTES
"  SUBJECTIVE:                                                    Liborio Medellin is a 56 year old male who presents to clinic today for the following health issues:        Chief Complaint   Patient presents with     Infection     Recheck sores on both legs.  Saw Dr. Whalen before.  Still taking antibotic from Monday for 10 days.   3rd round of antibiotics.   Is very itchy and needs something for that.       Follow-up of cellulitis:  Seen by PCP on 1/23, and started antibiotic.  He feels the pain, swelling, redness is improving since being on the antibiotic. He is having severe itching of the skin in the legs.      Current Outpatient Prescriptions   Medication Sig Dispense Refill     clindamycin (CLEOCIN) 300 MG capsule Take 1 capsule (300 mg) by mouth 4 times daily for 7 days 28 capsule 0     hydrochlorothiazide (HYDRODIURIL) 25 MG tablet Take 1 tablet (25 mg) by mouth daily 30 tablet 0     aspirin 81 MG tablet Take 1 tablet (81 mg) by mouth daily 90 tablet 3     order for DME Equipment being ordered: compression stockings above knee 1 pair 1 Units 1     Problem list, Medication list, Allergies, and Medical/Social/Surgical histories reviewed in [facility] and updated as appropriate.    ROS:  Constitutional, HEENT, cardiovascular, pulmonary, gi and gu systems are negative, except as otherwise noted.    OBJECTIVE:                                                    /73 mmHg  Pulse 68  Temp(Src) 97.2  F (36.2  C) (Tympanic)  Resp 20  Ht 5' 9\" (1.753 m)  Wt 244 lb (110.678 kg)  BMI 36.02 kg/m2  Body mass index is 36.02 kg/(m^2).  GENERAL APPEARANCE: alert and no distress  SKIN: improving erythema, receding from demarcated areas. Scabbed over superficial ulcerations on right anterior lower leg and left posterior lower leg.  No purulence or drainage  Lymph:  1+ edema to knees.       ASSESSMENT/PLAN:                                                        ICD-10-CM    1. Cellulitis of right lower extremity L03.115  "   2. Venous stasis dermatitis of both lower extremities I83.11     I83.12    3. Screen for colon cancer Z12.11 Fecal colorectal cancer screen (FIT)   4. Uncontrolled hypertension I10    5. Tobacco use disorder F17.200    cellulitis vs venous stasis.  Erythema receding, but perhaps not as much as would be expected, so suspect a large contributor to his erythema is venous stasis.      1. Schedule your colonoscopy or return the FIT test  2. Schedule with the Lymphedema clinic  3. Use lots of good thick hydrating cream on the skin (Eucerin, Aveeno, Gold Bond) at least twice daily for itching.  4. See Dr. Whalen in 1 month, to check-up on blood pressure.  5. Keep working on quitting smoking  6. Finish antibiotic     Layne Michel, DO  [facility]

## 2017-01-27 NOTE — NURSING NOTE
"Chief Complaint   Patient presents with     Infection     Recheck sores on both legs.  Saw Dr. Whalen before.  Still taking antibotic from Monday for 10 days.   3rd round of antibiotics.       Initial /73 mmHg  Pulse 68  Temp(Src) 97.2  F (36.2  C) (Tympanic)  Resp 20  Ht 5' 9\" (1.753 m)  Wt 244 lb (110.678 kg)  BMI 36.02 kg/m2 Estimated body mass index is 36.02 kg/(m^2) as calculated from the following:    Height as of this encounter: 5' 9\" (1.753 m).    Weight as of this encounter: 244 lb (110.678 kg).  BP completed using cuff size: large  "

## 2017-01-27 NOTE — MR AVS SNAPSHOT
After Visit Summary   1/27/2017    Liborio Medellin    MRN: [id number]           Patient Information     Date Of Birth          1960        Visit Information        Provider Department      1/27/2017 8:00 AM Layne Michel, DO [facility]        Today's Diagnoses     Screen for colon cancer    -  1     Uncontrolled hypertension         Tobacco use disorder         Venous stasis dermatitis of both lower extremities         Cellulitis of right lower extremity           Care Instructions      Thank you for choosing [facility].  You may be receiving a survey in the mail from Aneudy Kapoor regarding your visit today.  Please take a few minutes to complete and return the survey to let us know how we are doing.      If you have questions or concerns, please contact us via [employer] or you can contact your care team at [phone].    Our Clinic hours are:  Monday 6:40 am  to 7:00 pm  Tuesday -Friday 6:40 am to 5:00 pm    The [place] outpatient lab hours are:  Monday - Friday 6:10 am to 4:45 pm  Saturdays 7:00 am to 11:00 am  Appointments are required, call [phone]    If you have clinical questions after hours or would like to schedule an appointment,  call the clinic at [phone].      [facility]      1. Schedule your colonoscopy or return the FIT test  2. Schedule with the Lymphedema clinic  3. Use lots of good thick hydrating cream on the skin (Eucerin, Aveeno, Gold Bond) at least twice daily  4. See Dr. Whalen in 1 month, to check-up on blood pressure.  5. Keep working on quitting smoking  6. Finish antibiotic     How to quit smoking:    Know your reasons for quitting! Remind yourself of these reasons when you struggle with the urge to smoking. Post these reasons in a visible place such as a post-it note on your mirror in the bathroom or on the dash of your car.    Pick a quit date. Set yourself up for success by planning how to be successful. Have a strategy,  "including having others hold you accountable to your plans to quit.    Tell others you are quitting. Have people hold you accountable -- ask them to challenge you to quit smoking if they see you picking up a cigarette. Don't have them turn a \"blind eye\". The best people to hold you accountable are those who genuinely care about you and are around you frequently.    Remove triggers for smoking. Don't hang out with other smokers, or alternatively band them with you in attempt to quit. If you always smoke while in the car, have a strategy in place to deal with the smoking urge that is likely triggered by being in a car.     Throw away all smoking gear -- cigs, lighters, vandana trays. Don't put them in the basement for use \"in case you fail to quit smoking\". No -- make it harder for yourself to start smoking again.    Set up a reward for yourself. This should be a non-food reward. Set aside the money that you are saving by not buying cigarettes, and have fun with it if you have not smoked for 3 months (or whatever your goal is -- it might be longer).     When you feel urge to smoke, set yourself up for success. Remove access to cigarettes -- go for walk without wallet, call a support person, delay acting on urge for 15-30 minutes or more, remind yourself of your motivations for quitting. Ok to use nicotine gum or other nicotine replacement.     Have a replacement activity available for your hands or mouth. You might chew gum (nicotine free or otherwise) or chew toothpick. You might draw or do other activity with hands.     Be gentle with yourself -- if you start smoking, just pick yourself up again and quit again. If you smoke a few cigarettes some day, it doesn't mean the day is a failure (or that you should just smoke all you want on that day). Each cigarette that you don't smoke is a success, and so fight for each success.     If you don't manage to quit smoking on this try, try again!           Follow-ups after your " "visit        Future tests that were ordered for you today     Open Future Orders        Priority Expected Expires Ordered    Fecal colorectal cancer screen (FIT) Routine 2017            Who to contact     If you have questions or need follow up information about today's clinic visit or your schedule please contact [facility] directly at [phone].  Normal or non-critical lab and imaging results will be communicated to you by MyChart, letter or phone within 4 business days after the clinic has received the results. If you do not hear from us within 7 days, please contact the clinic through [employer] or phone. If you have a critical or abnormal lab result, we will notify you by phone as soon as possible.  Submit refill requests through [employer] or call your pharmacy and they will forward the refill request to us. Please allow 3 business days for your refill to be completed.          Additional Information About Your Visit        [employer] Information     [employer] lets you send messages to your doctor, view your test results, renew your prescriptions, schedule appointments and more. To sign up, go to [email or web address] . Click on \"Log in\" on the left side of the screen, which will take you to the Welcome page. Then click on \"Sign up Now\" on the right side of the page.     You will be asked to enter the access code listed below, as well as some personal information. Please follow the directions to create your username and password.     Your access code is: [email or web address]  Expires: 2017  9:49 AM     Your access code will  in 90 days. If you need help or a new code, please call your [place] clinic or [phone].        Care EveryWhere ID     This is your Care EveryWhere ID. This could be used by other organizations to access your [place] medical records  [id number]        Your Vitals Were     Pulse Temperature Respirations Height BMI (Body Mass Index)       68 97.2  F " "(36.2  C) (Tympanic) 20 5' 9\" (1.753 m) 36.02 kg/m2        Blood Pressure from Last 3 Encounters:   01/27/17 149/73   01/23/17 159/90   01/04/17 156/80    Weight from Last 3 Encounters:   01/27/17 244 lb (110.678 kg)   01/23/17 245 lb 3.2 oz (111.222 kg)   01/04/17 249 lb (112.946 kg)               Primary Care Provider Office Phone # Fax #    Liborio Mercer [phone]       [facility] [address] [facility] [address]        Thank you!     Thank you for choosing [facility]  for your care. Our goal is always to provide you with excellent care. Hearing back from our patients is one way we can continue to improve our services. Please take a few minutes to complete the written survey that you may receive in the mail after your visit with us. Thank you!             Your Updated Medication List - Protect others around you: Learn how to safely use, store and throw away your medicines at www.disposemymeds.org.          This list is accurate as of: 1/27/17  8:26 AM.  Always use your most recent med list.                   Brand Name Dispense Instructions for use    aspirin 81 MG tablet     90 tablet    Take 1 tablet (81 mg) by mouth daily       clindamycin 300 MG capsule    CLEOCIN    28 capsule    Take 1 capsule (300 mg) by mouth 4 times daily for 7 days       hydrochlorothiazide 25 MG tablet    HYDRODIURIL    30 tablet    Take 1 tablet (25 mg) by mouth daily       order for DME     1 Units    Equipment being ordered: compression stockings above knee 1 pair         "

## 2017-05-13 ENCOUNTER — TELEPHONE (OUTPATIENT)
Dept: FAMILY MEDICINE | Facility: CLINIC | Age: 57
End: 2017-05-13

## 2017-10-04 ENCOUNTER — TELEPHONE (OUTPATIENT)
Dept: FAMILY MEDICINE | Facility: CLINIC | Age: 57
End: 2017-10-04

## 2017-10-04 NOTE — TELEPHONE ENCOUNTER
Panel Management Review  Composite cancer screening  Chart review shows that this patient is due/due soon for the following Colonoscopy  Summary:    Patient is due/failing the following:   COLONOSCOPY    Action needed:   Patient needs referral/order: for colonoscopy    Type of outreach:    Sent letter.    Questions for provider review:    None                                                                                                                                    Luz Maria Ragland CMA

## 2017-10-04 NOTE — LETTER
October 4, 2017      Liborio Medellin  [address]   [facility] [id number]      At this time you are due for a Colon Cancer Screening. Here is some information regarding this testing.     Recommended every 5-10 years, depending on your history, in order to prevent and detect colon cancer at its earliest stages.  Colon cancer is now the second leading cause of death in the [place] for both men and women and there are over 130,000 new cases and 50,000 deaths per year from colon cancer.  Colonoscopies can prevent 90-95% of these deaths.  Problem lesions can be removed before they ever become cancer. This test is not only looking for cancer, but also getting rid of precancerious lesions. You are usually given some sedation which makes the test very comfortable for most people.      If you do not wish to do a colonoscopy or cannot afford to do one, at this time, there is another option. It is called a FIT test or Fecal Immunochemical Occult Blood Test (take home stool sample kit).  It does not replace the colonoscopy for colorectal cancer screening, but it can detect hidden bleeding in the lower colon.  It does need to be repeated every year and if a positive result is obtained, you would be referred for a colonoscopy. The FIT test is really easy to do and does not require any  diet or medication restrictions and involves only one collection sample.      If you have completed either one of these tests or had a flexible sigmoidoscopy in the past five years at another facility, please have the records sent to our clinic so that we can best coordinate your care and update your chart.  Please call us if you have questions or would like arrange either to do a colonoscopy or obtain the necessary test kit for the Fecal Occult Test.      Sincerely,        Bean Whalen MD

## 2017-11-14 ENCOUNTER — APPOINTMENT (OUTPATIENT)
Dept: CARDIOLOGY | Facility: CLINIC | Age: 57
End: 2017-11-14
Attending: INTERNAL MEDICINE
Payer: MEDICAID

## 2017-11-14 ENCOUNTER — HOSPITAL ENCOUNTER (INPATIENT)
Facility: CLINIC | Age: 57
LOS: 3 days | Discharge: HOME | End: 2017-11-17
Attending: INTERNAL MEDICINE | Admitting: INTERNAL MEDICINE
Payer: MEDICAID

## 2017-11-14 ENCOUNTER — OFFICE VISIT (OUTPATIENT)
Dept: FAMILY MEDICINE | Facility: CLINIC | Age: 57
End: 2017-11-14
Payer: MEDICAID

## 2017-11-14 ENCOUNTER — HOSPITAL ENCOUNTER (EMERGENCY)
Facility: CLINIC | Age: 57
End: 2017-11-14

## 2017-11-14 ENCOUNTER — ALLIED HEALTH/NURSE VISIT (OUTPATIENT)
Dept: FAMILY MEDICINE | Facility: CLINIC | Age: 57
End: 2017-11-14
Payer: MEDICAID

## 2017-11-14 VITALS
OXYGEN SATURATION: 92 % | RESPIRATION RATE: 16 BRPM | SYSTOLIC BLOOD PRESSURE: 196 MMHG | TEMPERATURE: 98.5 F | HEART RATE: 88 BPM | DIASTOLIC BLOOD PRESSURE: 96 MMHG

## 2017-11-14 VITALS
RESPIRATION RATE: 20 BRPM | OXYGEN SATURATION: 90 % | DIASTOLIC BLOOD PRESSURE: 96 MMHG | HEART RATE: 75 BPM | SYSTOLIC BLOOD PRESSURE: 196 MMHG

## 2017-11-14 DIAGNOSIS — I21.3 ST ELEVATION MYOCARDIAL INFARCTION (STEMI), UNSPECIFIED ARTERY (H): ICD-10-CM

## 2017-11-14 DIAGNOSIS — I21.3 ST ELEVATION MYOCARDIAL INFARCTION (STEMI), UNSPECIFIED ARTERY (H): Primary | ICD-10-CM

## 2017-11-14 DIAGNOSIS — R07.9 ACUTE CHEST PAIN: Primary | ICD-10-CM

## 2017-11-14 DIAGNOSIS — I21.02 ST ELEVATION MYOCARDIAL INFARCTION INVOLVING LEFT ANTERIOR DESCENDING (LAD) CORONARY ARTERY (H): Primary | ICD-10-CM

## 2017-11-14 LAB
ANION GAP SERPL CALCULATED.3IONS-SCNC: 7 MMOL/L (ref 3–14)
BUN SERPL-MCNC: 21 MG/DL (ref 7–30)
CA-I BLD-SCNC: 4.7 MG/DL (ref 4.4–5.2)
CALCIUM SERPL-MCNC: 8.4 MG/DL (ref 8.5–10.1)
CHLORIDE SERPL-SCNC: 106 MMOL/L (ref 94–109)
CO2 BLD-SCNC: 23 MMOL/L (ref 21–28)
CO2 SERPL-SCNC: 26 MMOL/L (ref 20–32)
CREAT SERPL-MCNC: 1.19 MG/DL (ref 0.66–1.25)
ERYTHROCYTE [DISTWIDTH] IN BLOOD BY AUTOMATED COUNT: 13.8 % (ref 10–15)
GFR SERPL CREATININE-BSD FRML MDRD: 63 ML/MIN/1.7M2
GLUCOSE BLD-MCNC: 144 MG/DL (ref 70–99)
GLUCOSE SERPL-MCNC: 132 MG/DL (ref 70–99)
HCT VFR BLD AUTO: 44.7 % (ref 40–53)
HCT VFR BLD CALC: 39 %PCV (ref 40–53)
HGB BLD CALC-MCNC: 13.3 G/DL (ref 13.3–17.7)
HGB BLD-MCNC: 14.4 G/DL (ref 13.3–17.7)
KCT BLD-ACNC: 249 SEC (ref 105–167)
KCT BLD-ACNC: 253 SEC (ref 105–167)
MAGNESIUM SERPL-MCNC: 2.3 MG/DL (ref 1.6–2.3)
MCH RBC QN AUTO: 29 PG (ref 26.5–33)
MCHC RBC AUTO-ENTMCNC: 32.2 G/DL (ref 31.5–36.5)
MCV RBC AUTO: 90 FL (ref 78–100)
PCO2 BLD: 43 MM HG (ref 35–45)
PH BLD: 7.34 PH (ref 7.35–7.45)
PLATELET # BLD AUTO: 243 10E9/L (ref 150–450)
PO2 BLD: 131 MM HG (ref 80–105)
POTASSIUM BLD-SCNC: 4.3 MMOL/L (ref 3.4–5.3)
POTASSIUM SERPL-SCNC: 4 MMOL/L (ref 3.4–5.3)
RBC # BLD AUTO: 4.97 10E12/L (ref 4.4–5.9)
SAO2 % BLDA FROM PO2: 99 % (ref 92–100)
SODIUM BLD-SCNC: 137 MMOL/L (ref 133–144)
SODIUM SERPL-SCNC: 139 MMOL/L (ref 133–144)
TROPONIN I SERPL-MCNC: 2 UG/L (ref 0–0.04)
WBC # BLD AUTO: 14.7 10E9/L (ref 4–11)

## 2017-11-14 PROCEDURE — 027034Z DILATION OF CORONARY ARTERY, ONE ARTERY WITH DRUG-ELUTING INTRALUMINAL DEVICE, PERCUTANEOUS APPROACH: ICD-10-PCS | Performed by: INTERNAL MEDICINE

## 2017-11-14 PROCEDURE — 92941 PRQ TRLML REVSC TOT OCCL AMI: CPT | Mod: LD | Performed by: INTERNAL MEDICINE

## 2017-11-14 PROCEDURE — C1874 STENT, COATED/COV W/DEL SYS: HCPCS

## 2017-11-14 PROCEDURE — 02C03ZZ EXTIRPATION OF MATTER FROM CORONARY ARTERY, ONE ARTERY, PERCUTANEOUS APPROACH: ICD-10-PCS | Performed by: INTERNAL MEDICINE

## 2017-11-14 PROCEDURE — C9600 PERC DRUG-EL COR STENT SING: HCPCS

## 2017-11-14 PROCEDURE — 02703ZZ DILATION OF CORONARY ARTERY, ONE ARTERY, PERCUTANEOUS APPROACH: ICD-10-PCS | Performed by: INTERNAL MEDICINE

## 2017-11-14 PROCEDURE — 25000128 H RX IP 250 OP 636

## 2017-11-14 PROCEDURE — 80048 BASIC METABOLIC PNL TOTAL CA: CPT | Performed by: INTERNAL MEDICINE

## 2017-11-14 PROCEDURE — 27210760 ZZH DEVICE INFLATION CR7

## 2017-11-14 PROCEDURE — 25000128 H RX IP 250 OP 636: Performed by: HOSPITALIST

## 2017-11-14 PROCEDURE — 40000556 ZZH STATISTIC PERIPHERAL IV START W US GUIDANCE

## 2017-11-14 PROCEDURE — 27210998 ZZH ACCESS HEART CATH CR6

## 2017-11-14 PROCEDURE — C1894 INTRO/SHEATH, NON-LASER: HCPCS

## 2017-11-14 PROCEDURE — B2111ZZ FLUOROSCOPY OF MULTIPLE CORONARY ARTERIES USING LOW OSMOLAR CONTRAST: ICD-10-PCS | Performed by: INTERNAL MEDICINE

## 2017-11-14 PROCEDURE — 84295 ASSAY OF SERUM SODIUM: CPT

## 2017-11-14 PROCEDURE — 85014 HEMATOCRIT: CPT

## 2017-11-14 PROCEDURE — C1769 GUIDE WIRE: HCPCS

## 2017-11-14 PROCEDURE — 93454 CORONARY ARTERY ANGIO S&I: CPT

## 2017-11-14 PROCEDURE — C9606 PERC D-E COR REVASC W AMI S: HCPCS | Mod: LD

## 2017-11-14 PROCEDURE — 93000 ELECTROCARDIOGRAM COMPLETE: CPT

## 2017-11-14 PROCEDURE — 85347 COAGULATION TIME ACTIVATED: CPT

## 2017-11-14 PROCEDURE — 20000004 ZZH R&B ICU UMMC

## 2017-11-14 PROCEDURE — 93454 CORONARY ARTERY ANGIO S&I: CPT | Mod: 26 | Performed by: INTERNAL MEDICINE

## 2017-11-14 PROCEDURE — 3E073PZ INTRODUCTION OF PLATELET INHIBITOR INTO CORONARY ARTERY, PERCUTANEOUS APPROACH: ICD-10-PCS | Performed by: INTERNAL MEDICINE

## 2017-11-14 PROCEDURE — 27210946 ZZH KIT HC TOTES DISP CR8

## 2017-11-14 PROCEDURE — 82330 ASSAY OF CALCIUM: CPT

## 2017-11-14 PROCEDURE — 82947 ASSAY GLUCOSE BLOOD QUANT: CPT

## 2017-11-14 PROCEDURE — 99214 OFFICE O/P EST MOD 30 MIN: CPT | Performed by: NURSE PRACTITIONER

## 2017-11-14 PROCEDURE — 5A2204Z RESTORATION OF CARDIAC RHYTHM, SINGLE: ICD-10-PCS | Performed by: INTERNAL MEDICINE

## 2017-11-14 PROCEDURE — 85027 COMPLETE CBC AUTOMATED: CPT | Performed by: INTERNAL MEDICINE

## 2017-11-14 PROCEDURE — 25000132 ZZH RX MED GY IP 250 OP 250 PS 637

## 2017-11-14 PROCEDURE — C1757 CATH, THROMBECTOMY/EMBOLECT: HCPCS

## 2017-11-14 PROCEDURE — 99232 SBSQ HOSP IP/OBS MODERATE 35: CPT | Mod: AI | Performed by: INTERNAL MEDICINE

## 2017-11-14 PROCEDURE — C1887 CATHETER, GUIDING: HCPCS

## 2017-11-14 PROCEDURE — 27210787 ZZH MANIFOLD CR2

## 2017-11-14 PROCEDURE — 99152 MOD SED SAME PHYS/QHP 5/>YRS: CPT

## 2017-11-14 PROCEDURE — 99153 MOD SED SAME PHYS/QHP EA: CPT

## 2017-11-14 PROCEDURE — 25000132 ZZH RX MED GY IP 250 OP 250 PS 637: Performed by: INTERNAL MEDICINE

## 2017-11-14 PROCEDURE — 83735 ASSAY OF MAGNESIUM: CPT | Performed by: INTERNAL MEDICINE

## 2017-11-14 PROCEDURE — 99207 ZZC NO CHARGE NURSE ONLY: CPT

## 2017-11-14 PROCEDURE — 93010 ELECTROCARDIOGRAM REPORT: CPT | Performed by: INTERNAL MEDICINE

## 2017-11-14 PROCEDURE — 27211089 ZZH KIT ACIST INJECTOR CR3

## 2017-11-14 PROCEDURE — 93005 ELECTROCARDIOGRAM TRACING: CPT

## 2017-11-14 PROCEDURE — 82803 BLOOD GASES ANY COMBINATION: CPT

## 2017-11-14 PROCEDURE — C1725 CATH, TRANSLUMIN NON-LASER: HCPCS

## 2017-11-14 PROCEDURE — 36415 COLL VENOUS BLD VENIPUNCTURE: CPT | Performed by: INTERNAL MEDICINE

## 2017-11-14 PROCEDURE — 84132 ASSAY OF SERUM POTASSIUM: CPT

## 2017-11-14 PROCEDURE — 84484 ASSAY OF TROPONIN QUANT: CPT | Performed by: INTERNAL MEDICINE

## 2017-11-14 PROCEDURE — 25000128 H RX IP 250 OP 636: Performed by: INTERNAL MEDICINE

## 2017-11-14 PROCEDURE — 25000125 ZZHC RX 250: Performed by: INTERNAL MEDICINE

## 2017-11-14 RX ORDER — HYDRALAZINE HYDROCHLORIDE 20 MG/ML
10-20 INJECTION INTRAMUSCULAR; INTRAVENOUS
Status: DISCONTINUED | OUTPATIENT
Start: 2017-11-14 | End: 2017-11-14 | Stop reason: HOSPADM

## 2017-11-14 RX ORDER — PRASUGREL 10 MG/1
10-60 TABLET, FILM COATED ORAL
Status: DISCONTINUED | OUTPATIENT
Start: 2017-11-14 | End: 2017-11-14 | Stop reason: HOSPADM

## 2017-11-14 RX ORDER — POTASSIUM CHLORIDE 29.8 MG/ML
20 INJECTION INTRAVENOUS
Status: DISCONTINUED | OUTPATIENT
Start: 2017-11-14 | End: 2017-11-14 | Stop reason: HOSPADM

## 2017-11-14 RX ORDER — CLOPIDOGREL BISULFATE 75 MG/1
75 TABLET ORAL
Status: DISCONTINUED | OUTPATIENT
Start: 2017-11-14 | End: 2017-11-14 | Stop reason: HOSPADM

## 2017-11-14 RX ORDER — FENTANYL CITRATE 50 UG/ML
25-50 INJECTION, SOLUTION INTRAMUSCULAR; INTRAVENOUS
Status: DISCONTINUED | OUTPATIENT
Start: 2017-11-14 | End: 2017-11-14 | Stop reason: HOSPADM

## 2017-11-14 RX ORDER — NALOXONE HYDROCHLORIDE 0.4 MG/ML
.2-.4 INJECTION, SOLUTION INTRAMUSCULAR; INTRAVENOUS; SUBCUTANEOUS
Status: ACTIVE | OUTPATIENT
Start: 2017-11-14 | End: 2017-11-15

## 2017-11-14 RX ORDER — SODIUM NITROPRUSSIDE 25 MG/ML
100-200 INJECTION INTRAVENOUS
Status: DISCONTINUED | OUTPATIENT
Start: 2017-11-14 | End: 2017-11-14 | Stop reason: HOSPADM

## 2017-11-14 RX ORDER — ASPIRIN 325 MG
325 TABLET ORAL
Status: DISCONTINUED | OUTPATIENT
Start: 2017-11-14 | End: 2017-11-14 | Stop reason: HOSPADM

## 2017-11-14 RX ORDER — ARGATROBAN 1 MG/ML
150 INJECTION, SOLUTION INTRAVENOUS
Status: DISCONTINUED | OUTPATIENT
Start: 2017-11-14 | End: 2017-11-14 | Stop reason: HOSPADM

## 2017-11-14 RX ORDER — ATROPINE SULFATE 0.1 MG/ML
.5-1 INJECTION INTRAVENOUS
Status: DISCONTINUED | OUTPATIENT
Start: 2017-11-14 | End: 2017-11-14 | Stop reason: HOSPADM

## 2017-11-14 RX ORDER — NALOXONE HYDROCHLORIDE 0.4 MG/ML
.1-.4 INJECTION, SOLUTION INTRAMUSCULAR; INTRAVENOUS; SUBCUTANEOUS
Status: DISCONTINUED | OUTPATIENT
Start: 2017-11-14 | End: 2017-11-17 | Stop reason: HOSPADM

## 2017-11-14 RX ORDER — LORAZEPAM 2 MG/ML
.5-2 INJECTION INTRAMUSCULAR EVERY 4 HOURS PRN
Status: DISCONTINUED | OUTPATIENT
Start: 2017-11-14 | End: 2017-11-14 | Stop reason: HOSPADM

## 2017-11-14 RX ORDER — EPTIFIBATIDE 2 MG/ML
180 INJECTION, SOLUTION INTRAVENOUS EVERY 10 MIN PRN
Status: DISCONTINUED | OUTPATIENT
Start: 2017-11-14 | End: 2017-11-14 | Stop reason: HOSPADM

## 2017-11-14 RX ORDER — NITROGLYCERIN 5 MG/ML
100-200 VIAL (ML) INTRAVENOUS
Status: DISCONTINUED | OUTPATIENT
Start: 2017-11-14 | End: 2017-11-14 | Stop reason: HOSPADM

## 2017-11-14 RX ORDER — PHENYLEPHRINE HCL IN 0.9% NACL 1 MG/10 ML
20-100 SYRINGE (ML) INTRAVENOUS
Status: DISCONTINUED | OUTPATIENT
Start: 2017-11-14 | End: 2017-11-14 | Stop reason: HOSPADM

## 2017-11-14 RX ORDER — LISINOPRIL 10 MG/1
10 TABLET ORAL DAILY
Status: DISCONTINUED | OUTPATIENT
Start: 2017-11-15 | End: 2017-11-15

## 2017-11-14 RX ORDER — HEPARIN SODIUM 1000 [USP'U]/ML
1000-10000 INJECTION, SOLUTION INTRAVENOUS; SUBCUTANEOUS EVERY 5 MIN PRN
Status: DISCONTINUED | OUTPATIENT
Start: 2017-11-14 | End: 2017-11-14 | Stop reason: HOSPADM

## 2017-11-14 RX ORDER — NICARDIPINE HYDROCHLORIDE 2.5 MG/ML
100 INJECTION INTRAVENOUS
Status: DISCONTINUED | OUTPATIENT
Start: 2017-11-14 | End: 2017-11-14 | Stop reason: HOSPADM

## 2017-11-14 RX ORDER — EPTIFIBATIDE 2 MG/ML
1 INJECTION, SOLUTION INTRAVENOUS CONTINUOUS PRN
Status: DISCONTINUED | OUTPATIENT
Start: 2017-11-14 | End: 2017-11-14 | Stop reason: HOSPADM

## 2017-11-14 RX ORDER — DEXTROSE MONOHYDRATE 25 G/50ML
12.5-5 INJECTION, SOLUTION INTRAVENOUS EVERY 30 MIN PRN
Status: DISCONTINUED | OUTPATIENT
Start: 2017-11-14 | End: 2017-11-14 | Stop reason: HOSPADM

## 2017-11-14 RX ORDER — NITROGLYCERIN 20 MG/100ML
.07-2 INJECTION INTRAVENOUS CONTINUOUS PRN
Status: DISCONTINUED | OUTPATIENT
Start: 2017-11-14 | End: 2017-11-14 | Stop reason: HOSPADM

## 2017-11-14 RX ORDER — CLOPIDOGREL BISULFATE 75 MG/1
300-600 TABLET ORAL
Status: DISCONTINUED | OUTPATIENT
Start: 2017-11-14 | End: 2017-11-14 | Stop reason: HOSPADM

## 2017-11-14 RX ORDER — ASPIRIN 81 MG/1
81-324 TABLET, CHEWABLE ORAL
Status: DISCONTINUED | OUTPATIENT
Start: 2017-11-14 | End: 2017-11-14 | Stop reason: HOSPADM

## 2017-11-14 RX ORDER — NITROGLYCERIN 20 MG/100ML
0.07-2 INJECTION INTRAVENOUS CONTINUOUS
Status: DISCONTINUED | OUTPATIENT
Start: 2017-11-14 | End: 2017-11-17 | Stop reason: HOSPADM

## 2017-11-14 RX ORDER — LIDOCAINE HYDROCHLORIDE 10 MG/ML
30 INJECTION, SOLUTION EPIDURAL; INFILTRATION; INTRACAUDAL; PERINEURAL
Status: DISCONTINUED | OUTPATIENT
Start: 2017-11-14 | End: 2017-11-14 | Stop reason: HOSPADM

## 2017-11-14 RX ORDER — HYDROCODONE BITARTRATE AND ACETAMINOPHEN 5; 325 MG/1; MG/1
1-2 TABLET ORAL EVERY 4 HOURS PRN
Status: DISCONTINUED | OUTPATIENT
Start: 2017-11-14 | End: 2017-11-17 | Stop reason: HOSPADM

## 2017-11-14 RX ORDER — HYDRALAZINE HYDROCHLORIDE 20 MG/ML
10 INJECTION INTRAMUSCULAR; INTRAVENOUS
Status: DISCONTINUED | OUTPATIENT
Start: 2017-11-14 | End: 2017-11-17 | Stop reason: HOSPADM

## 2017-11-14 RX ORDER — FLUMAZENIL 0.1 MG/ML
0.2 INJECTION, SOLUTION INTRAVENOUS
Status: DISCONTINUED | OUTPATIENT
Start: 2017-11-14 | End: 2017-11-14 | Stop reason: HOSPADM

## 2017-11-14 RX ORDER — PROTAMINE SULFATE 10 MG/ML
1-5 INJECTION, SOLUTION INTRAVENOUS
Status: DISCONTINUED | OUTPATIENT
Start: 2017-11-14 | End: 2017-11-14 | Stop reason: HOSPADM

## 2017-11-14 RX ORDER — ENALAPRILAT 1.25 MG/ML
1.25-2.5 INJECTION INTRAVENOUS
Status: DISCONTINUED | OUTPATIENT
Start: 2017-11-14 | End: 2017-11-14 | Stop reason: HOSPADM

## 2017-11-14 RX ORDER — SODIUM CHLORIDE 9 MG/ML
INJECTION, SOLUTION INTRAVENOUS CONTINUOUS
Status: DISPENSED | OUTPATIENT
Start: 2017-11-14 | End: 2017-11-14

## 2017-11-14 RX ORDER — METOPROLOL TARTRATE 25 MG/1
25 TABLET, FILM COATED ORAL 2 TIMES DAILY
Status: DISCONTINUED | OUTPATIENT
Start: 2017-11-15 | End: 2017-11-14

## 2017-11-14 RX ORDER — NITROGLYCERIN 0.4 MG/1
0.4 TABLET SUBLINGUAL EVERY 5 MIN PRN
Status: DISCONTINUED | OUTPATIENT
Start: 2017-11-14 | End: 2017-11-17 | Stop reason: HOSPADM

## 2017-11-14 RX ORDER — ARGATROBAN 1 MG/ML
350 INJECTION, SOLUTION INTRAVENOUS
Status: DISCONTINUED | OUTPATIENT
Start: 2017-11-14 | End: 2017-11-14 | Stop reason: HOSPADM

## 2017-11-14 RX ORDER — ONDANSETRON 2 MG/ML
4 INJECTION INTRAMUSCULAR; INTRAVENOUS EVERY 4 HOURS PRN
Status: DISCONTINUED | OUTPATIENT
Start: 2017-11-14 | End: 2017-11-14 | Stop reason: HOSPADM

## 2017-11-14 RX ORDER — METOPROLOL TARTRATE 1 MG/ML
5 INJECTION, SOLUTION INTRAVENOUS EVERY 5 MIN PRN
Status: DISCONTINUED | OUTPATIENT
Start: 2017-11-14 | End: 2017-11-14 | Stop reason: HOSPADM

## 2017-11-14 RX ORDER — POTASSIUM CHLORIDE 7.45 MG/ML
10 INJECTION INTRAVENOUS
Status: DISCONTINUED | OUTPATIENT
Start: 2017-11-14 | End: 2017-11-14 | Stop reason: HOSPADM

## 2017-11-14 RX ORDER — FUROSEMIDE 10 MG/ML
20-100 INJECTION INTRAMUSCULAR; INTRAVENOUS
Status: DISCONTINUED | OUTPATIENT
Start: 2017-11-14 | End: 2017-11-14 | Stop reason: HOSPADM

## 2017-11-14 RX ORDER — DOBUTAMINE HYDROCHLORIDE 200 MG/100ML
2-20 INJECTION INTRAVENOUS CONTINUOUS PRN
Status: DISCONTINUED | OUTPATIENT
Start: 2017-11-14 | End: 2017-11-14 | Stop reason: HOSPADM

## 2017-11-14 RX ORDER — ATROPINE SULFATE 0.1 MG/ML
0.5 INJECTION INTRAVENOUS EVERY 5 MIN PRN
Status: ACTIVE | OUTPATIENT
Start: 2017-11-14 | End: 2017-11-15

## 2017-11-14 RX ORDER — MAGNESIUM HYDROXIDE 1200 MG/15ML
1000 LIQUID ORAL CONTINUOUS PRN
Status: DISCONTINUED | OUTPATIENT
Start: 2017-11-14 | End: 2017-11-14 | Stop reason: HOSPADM

## 2017-11-14 RX ORDER — NIFEDIPINE 10 MG/1
10 CAPSULE ORAL
Status: DISCONTINUED | OUTPATIENT
Start: 2017-11-14 | End: 2017-11-14 | Stop reason: HOSPADM

## 2017-11-14 RX ORDER — PROTAMINE SULFATE 10 MG/ML
25-100 INJECTION, SOLUTION INTRAVENOUS EVERY 5 MIN PRN
Status: DISCONTINUED | OUTPATIENT
Start: 2017-11-14 | End: 2017-11-14 | Stop reason: HOSPADM

## 2017-11-14 RX ORDER — DIPHENHYDRAMINE HYDROCHLORIDE 50 MG/ML
25-50 INJECTION INTRAMUSCULAR; INTRAVENOUS
Status: DISCONTINUED | OUTPATIENT
Start: 2017-11-14 | End: 2017-11-14 | Stop reason: HOSPADM

## 2017-11-14 RX ORDER — NITROGLYCERIN 0.4 MG/1
0.4 TABLET SUBLINGUAL EVERY 5 MIN PRN
Status: DISCONTINUED | OUTPATIENT
Start: 2017-11-14 | End: 2017-11-14 | Stop reason: HOSPADM

## 2017-11-14 RX ORDER — PROMETHAZINE HYDROCHLORIDE 25 MG/ML
6.25-25 INJECTION, SOLUTION INTRAMUSCULAR; INTRAVENOUS EVERY 4 HOURS PRN
Status: DISCONTINUED | OUTPATIENT
Start: 2017-11-14 | End: 2017-11-14 | Stop reason: HOSPADM

## 2017-11-14 RX ORDER — NALOXONE HYDROCHLORIDE 0.4 MG/ML
0.4 INJECTION, SOLUTION INTRAMUSCULAR; INTRAVENOUS; SUBCUTANEOUS EVERY 5 MIN PRN
Status: DISCONTINUED | OUTPATIENT
Start: 2017-11-14 | End: 2017-11-14 | Stop reason: HOSPADM

## 2017-11-14 RX ORDER — ADENOSINE 3 MG/ML
12-12000 INJECTION, SOLUTION INTRAVENOUS
Status: DISCONTINUED | OUTPATIENT
Start: 2017-11-14 | End: 2017-11-14 | Stop reason: HOSPADM

## 2017-11-14 RX ORDER — VERAPAMIL HYDROCHLORIDE 2.5 MG/ML
1-5 INJECTION, SOLUTION INTRAVENOUS
Status: COMPLETED | OUTPATIENT
Start: 2017-11-14 | End: 2017-11-14

## 2017-11-14 RX ORDER — NITROGLYCERIN 5 MG/ML
100-500 VIAL (ML) INTRAVENOUS
Status: COMPLETED | OUTPATIENT
Start: 2017-11-14 | End: 2017-11-14

## 2017-11-14 RX ORDER — ONDANSETRON 2 MG/ML
4 INJECTION INTRAMUSCULAR; INTRAVENOUS EVERY 6 HOURS PRN
Status: DISCONTINUED | OUTPATIENT
Start: 2017-11-14 | End: 2017-11-17 | Stop reason: HOSPADM

## 2017-11-14 RX ORDER — METHYLPREDNISOLONE SODIUM SUCCINATE 125 MG/2ML
125 INJECTION, POWDER, LYOPHILIZED, FOR SOLUTION INTRAMUSCULAR; INTRAVENOUS
Status: DISCONTINUED | OUTPATIENT
Start: 2017-11-14 | End: 2017-11-14 | Stop reason: HOSPADM

## 2017-11-14 RX ORDER — FLUMAZENIL 0.1 MG/ML
0.2 INJECTION, SOLUTION INTRAVENOUS
Status: ACTIVE | OUTPATIENT
Start: 2017-11-14 | End: 2017-11-15

## 2017-11-14 RX ORDER — ONDANSETRON 4 MG/1
4 TABLET, ORALLY DISINTEGRATING ORAL EVERY 6 HOURS PRN
Status: DISCONTINUED | OUTPATIENT
Start: 2017-11-14 | End: 2017-11-17 | Stop reason: HOSPADM

## 2017-11-14 RX ORDER — VERAPAMIL HYDROCHLORIDE 2.5 MG/ML
1-5 INJECTION, SOLUTION INTRAVENOUS
Status: DISCONTINUED | OUTPATIENT
Start: 2017-11-14 | End: 2017-11-14 | Stop reason: HOSPADM

## 2017-11-14 RX ORDER — ASPIRIN 81 MG/1
81 TABLET ORAL DAILY
Status: DISCONTINUED | OUTPATIENT
Start: 2017-11-15 | End: 2017-11-17 | Stop reason: HOSPADM

## 2017-11-14 RX ORDER — IOPAMIDOL 755 MG/ML
270 INJECTION, SOLUTION INTRAVASCULAR ONCE
Status: COMPLETED | OUTPATIENT
Start: 2017-11-14 | End: 2017-11-14

## 2017-11-14 RX ORDER — NITROGLYCERIN 5 MG/ML
100-500 VIAL (ML) INTRAVENOUS
Status: DISCONTINUED | OUTPATIENT
Start: 2017-11-14 | End: 2017-11-14 | Stop reason: HOSPADM

## 2017-11-14 RX ORDER — EPINEPHRINE 1 MG/ML
0.3 INJECTION, SOLUTION, CONCENTRATE INTRAVENOUS
Status: DISCONTINUED | OUTPATIENT
Start: 2017-11-14 | End: 2017-11-14 | Stop reason: HOSPADM

## 2017-11-14 RX ORDER — ACETAMINOPHEN 325 MG/1
325-650 TABLET ORAL EVERY 4 HOURS PRN
Status: DISCONTINUED | OUTPATIENT
Start: 2017-11-14 | End: 2017-11-17 | Stop reason: HOSPADM

## 2017-11-14 RX ORDER — DOPAMINE HYDROCHLORIDE 160 MG/100ML
2-20 INJECTION, SOLUTION INTRAVENOUS CONTINUOUS PRN
Status: DISCONTINUED | OUTPATIENT
Start: 2017-11-14 | End: 2017-11-14 | Stop reason: HOSPADM

## 2017-11-14 RX ORDER — ATORVASTATIN CALCIUM 20 MG/1
40 TABLET, FILM COATED ORAL
Status: DISCONTINUED | OUTPATIENT
Start: 2017-11-14 | End: 2017-11-17 | Stop reason: HOSPADM

## 2017-11-14 RX ORDER — MORPHINE SULFATE 2 MG/ML
INJECTION, SOLUTION INTRAMUSCULAR; INTRAVENOUS
Status: COMPLETED
Start: 2017-11-14 | End: 2017-11-14

## 2017-11-14 RX ORDER — LIDOCAINE 40 MG/G
CREAM TOPICAL
Status: DISCONTINUED | OUTPATIENT
Start: 2017-11-14 | End: 2017-11-17 | Stop reason: HOSPADM

## 2017-11-14 RX ORDER — EPTIFIBATIDE 2 MG/ML
2 INJECTION, SOLUTION INTRAVENOUS CONTINUOUS PRN
Status: DISCONTINUED | OUTPATIENT
Start: 2017-11-14 | End: 2017-11-14 | Stop reason: HOSPADM

## 2017-11-14 RX ORDER — MORPHINE SULFATE 2 MG/ML
1 INJECTION, SOLUTION INTRAMUSCULAR; INTRAVENOUS ONCE
Status: COMPLETED | OUTPATIENT
Start: 2017-11-14 | End: 2017-11-14

## 2017-11-14 RX ADMIN — HEPARIN SODIUM 10000 UNITS: 1000 INJECTION, SOLUTION INTRAVENOUS; SUBCUTANEOUS at 17:01

## 2017-11-14 RX ADMIN — FENTANYL CITRATE 50 MCG: 50 INJECTION, SOLUTION INTRAMUSCULAR; INTRAVENOUS at 17:54

## 2017-11-14 RX ADMIN — ABCIXIMAB 27.6 MG: 2 INJECTION, SOLUTION INTRAVENOUS at 17:18

## 2017-11-14 RX ADMIN — IOPAMIDOL 270 ML: 755 INJECTION, SOLUTION INTRAVASCULAR at 18:30

## 2017-11-14 RX ADMIN — ABCIXIMAB 10 MCG/MIN: 2 INJECTION, SOLUTION INTRAVENOUS at 17:48

## 2017-11-14 RX ADMIN — FENTANYL CITRATE 50 MCG: 50 INJECTION, SOLUTION INTRAMUSCULAR; INTRAVENOUS at 17:18

## 2017-11-14 RX ADMIN — NITROGLYCERIN 0.36 MCG/KG/MIN: 20 INJECTION INTRAVENOUS at 18:08

## 2017-11-14 RX ADMIN — MIDAZOLAM HYDROCHLORIDE 1 MG: 1 INJECTION, SOLUTION INTRAMUSCULAR; INTRAVENOUS at 17:28

## 2017-11-14 RX ADMIN — HEPARIN SODIUM 1500 UNITS: 1000 INJECTION, SOLUTION INTRAVENOUS; SUBCUTANEOUS at 17:04

## 2017-11-14 RX ADMIN — HEPARIN SODIUM 1000 UNITS: 1000 INJECTION, SOLUTION INTRAVENOUS; SUBCUTANEOUS at 17:16

## 2017-11-14 RX ADMIN — ATORVASTATIN CALCIUM 40 MG: 40 TABLET, FILM COATED ORAL at 20:23

## 2017-11-14 RX ADMIN — ADENOSINE 6000 MCG: 3 INJECTION, SOLUTION INTRAVENOUS at 17:56

## 2017-11-14 RX ADMIN — TICAGRELOR 180 MG: 90 TABLET ORAL at 18:08

## 2017-11-14 RX ADMIN — MIDAZOLAM HYDROCHLORIDE 1 MG: 1 INJECTION, SOLUTION INTRAMUSCULAR; INTRAVENOUS at 16:53

## 2017-11-14 RX ADMIN — NITROGLYCERIN 200 MCG: 5 INJECTION, SOLUTION INTRAVENOUS at 17:01

## 2017-11-14 RX ADMIN — MORPHINE SULFATE 1 MG: 2 INJECTION, SOLUTION INTRAMUSCULAR; INTRAVENOUS at 23:01

## 2017-11-14 RX ADMIN — HEPARIN SODIUM 500 UNITS: 1000 INJECTION, SOLUTION INTRAVENOUS; SUBCUTANEOUS at 17:04

## 2017-11-14 RX ADMIN — VERAPAMIL HYDROCHLORIDE 5 MG: 2.5 INJECTION, SOLUTION INTRAVENOUS at 17:01

## 2017-11-14 RX ADMIN — HYDRALAZINE HYDROCHLORIDE 20 MG: 20 INJECTION INTRAMUSCULAR; INTRAVENOUS at 18:16

## 2017-11-14 ASSESSMENT — PAIN DESCRIPTION - DESCRIPTORS: DESCRIPTORS: DULL;DISCOMFORT

## 2017-11-14 NOTE — MR AVS SNAPSHOT
After Visit Summary   11/14/2017    Liborio Medellin    MRN: [id number]           Patient Information     Date Of Birth          1960        Visit Information        Provider Department      11/14/2017 4:20 PM Elysia Storey APRN CNP [facility]        Today's Diagnoses     Acute chest pain    -  1       Follow-ups after your visit        Future tests that were ordered for you today     Open Standing Orders        Priority Remaining Interval Expires Ordered    Nitric oxide - 1 ) Start with 20 ppm Routine 07263/05859 CONTINUOUS  11/14/2017    Nitric oxide - 2)  After 20 ppm has been completed Routine 42352/13108 CONTINUOUS  11/14/2017    Nitric oxide - 3 ) After 40 ppm has been completed Routine 80688/65326 CONTINUOUS  11/14/2017    ISTAT ACT nursing POCT Routine 50/50 PRN  11/14/2017          Open Future Orders        Priority Expected Expires Ordered    Coronary Angiography Adult Order Routine  11/14/2018 11/14/2017            Who to contact     If you have questions or need follow up information about today's clinic visit or your schedule please contact [facility] directly at [phone].  Normal or non-critical lab and imaging results will be communicated to you by Bablichart, letter or phone within 4 business days after the clinic has received the results. If you do not hear from us within 7 days, please contact the clinic through [employer] or phone. If you have a critical or abnormal lab result, we will notify you by phone as soon as possible.  Submit refill requests through [employer] or call your pharmacy and they will forward the refill request to us. Please allow 3 business days for your refill to be completed.          Additional Information About Your Visit        Bablichart Information     [employer] lets you send messages to your doctor, view your test results, renew your prescriptions, schedule appointments and more. To sign up, go to  [email or web address] . Click on \"Log in\" on the left side of the screen, which will take you to the Welcome page. Then click on \"Sign up Now\" on the right side of the page.     You will be asked to enter the access code listed below, as well as some personal information. Please follow the directions to create your username and password.     Your access code is: [email or web address]  Expires: 2018  4:28 PM     Your access code will  in 90 days. If you need help or a new code, please call your [place] clinic or [phone].        Care EveryWhere ID     This is your Care EveryWhere ID. This could be used by other organizations to access your [place] medical records  [id number]        Your Vitals Were     Pulse Respirations Pulse Oximetry             75 20 90%          Blood Pressure from Last 3 Encounters:   17 (!) 196/96   17 (!) 196/96   17 144/86    Weight from Last 3 Encounters:   17 244 lb (110.7 kg)   17 245 lb 3.2 oz (111.2 kg)   17 249 lb (112.9 kg)              Today, you had the following     No orders found for display       Primary Care Provider Office Phone # Fax #    Liborio Mercer [phone] [address] [facility] [address]        Equal Access to Services     [facility]: Hadii mahin sheffield hadasho Sojayali, waaxda luqadaha, qaybta kaalmada adeegyada, yanick ball. So [facility] [phone].    ATENCIÓN: Si habla español, tiene a atkins disposición servicios gratuitos de asistencia lingüística. Gabby al [phone].    We comply with applicable federal civil rights laws and [place] laws. We do not discriminate on the basis of race, color, national origin, age, disability, sex, sexual orientation, or gender identity.            Thank you!     Thank you for choosing [facility]  for your care. Our goal is always to provide you with excellent care. Hearing back from our patients is one way we " can continue to improve our services. Please take a few minutes to complete the written survey that you may receive in the mail after your visit with us. Thank you!             Your Updated Medication List - Protect others around you: Learn how to safely use, store and throw away your medicines at www.disposemymeds.org.          This list is accurate as of: 11/14/17  4:47 PM.  Always use your most recent med list.                   Brand Name Dispense Instructions for use Diagnosis    aspirin 81 MG tablet     90 tablet    Take 1 tablet (81 mg) by mouth daily    Uncontrolled hypertension, Tobacco use disorder       order for DME     1 Units    Equipment being ordered: compression stockings above knee 1 pair    Cellulitis due to MRSA

## 2017-11-14 NOTE — PROGRESS NOTES
Liborio Medellin is a 57 year old male who presents to the office with complaints of chest pain.  Patient complains of chest pain.  The pain is characterized as moderate ain to mid chest that started early today and has not gone away.  He has had  intermittent over the last week that is brought on by excretion and relieved by rest. pressure and stabbing located epigastric area with radiation to none.   Pain is exacerbated by exertion.  Pain is relieved by rest.  Cardiac risk factors: hypertension, smoking      Current Outpatient Prescriptions:      aspirin 81 MG tablet, Take 1 tablet (81 mg) by mouth daily, Disp: 90 tablet, Rfl: 3     order for DME, Equipment being ordered: compression stockings above knee 1 pair, Disp: 1 Units, Rfl: 1    Problem list, Medication list, Allergies, and Medical/Social/Surgical histories reviewed in [facility] and updated as appropriate.    ROS:    ROS:  CONSTITUTIONAL:NEGATIVE for fever, chills, change in weight  INTEGUMENTARY/SKIN: NEGATIVE for worrisome rashes, moles or lesions  EYES: NEGATIVE for vision changes or irritation  ENT/MOUTH: NEGATIVE for ear, mouth and throat problems  RESP:NEGATIVE for significant cough or SOB  CV: See above  MUSCULOSKELETAL: NEGATIVE for significant arthralgias or myalgia  NEURO: NEGATIVE for weakness, dizziness or paresthesias        OBJECTIVE:                                                      Vitals:    11/14/17 1530 11/14/17 1640   BP: (!) 158/96 (!) 196/96   Pulse: 75    Resp: 20    SpO2: 90%        Constitutional: healthy, alert and no distress   Cardiovascular: PMI normal. No lifts, heaves, or thrills. RRR. No murmurs, clicks gallops or rub  Respiratory: negative, Percussion normal. Good diaphragmatic excursion. Lungs clear  Psychiatric: mentation appears normal and affect normal/bright  NEURO: Gait normal. Reflexes normal and symmetric. Sensation grossly WNL.  SKIN: no suspicious lesions or rashes    Office EKG demonstrates:  ST elevation in  inferior leads   No results found for this or any previous visit (from the past 24 hour(s)).    ASSESSMENT:                                                        ICD-10-CM    1. Acute chest pain R07.9        PLAN:                                                    Patient advised to go to Emergency Room via ambulance for further evaluation and treatment.     ASA 81mg chewable tabs (4 tabs given single dose) given to patient in office   Spoke to Dr. Sánchez at the [facility] Emergency Room in [place], [place] regarding patient transfer.       Patient voiced understanding of instructions given.     Elysia Storey CNP     [facility]

## 2017-11-14 NOTE — MR AVS SNAPSHOT
"              After Visit Summary   2017    Liborio Medellin    MRN: [id number]           Patient Information     Date Of Birth          1960        Visit Information        Provider Department      2017 3:45 PM FL NB RN [facility]        Today's Diagnoses     Acute chest pain    -  1       Follow-ups after your visit        Who to contact     If you have questions or need follow up information about today's clinic visit or your schedule please contact [facility] directly at [phone].  Normal or non-critical lab and imaging results will be communicated to you by MyChart, letter or phone within 4 business days after the clinic has received the results. If you do not hear from us within 7 days, please contact the clinic through [employer] or phone. If you have a critical or abnormal lab result, we will notify you by phone as soon as possible.  Submit refill requests through Zaya or call your pharmacy and they will forward the refill request to us. Please allow 3 business days for your refill to be completed.          Additional Information About Your Visit        MyChart Information     Zaya lets you send messages to your doctor, view your test results, renew your prescriptions, schedule appointments and more. To sign up, go to [email or web address] . Click on \"Log in\" on the left side of the screen, which will take you to the Welcome page. Then click on \"Sign up Now\" on the right side of the page.     You will be asked to enter the access code listed below, as well as some personal information. Please follow the directions to create your username and password.     Your access code is: [email or web address]  Expires: 2018  4:28 PM     Your access code will  in 90 days. If you need help or a new code, please call your [facility] or [phone].        Care EveryWhere ID     This is your Care EveryWhere ID. This could be used by other organizations " to access your [place] medical records  [id number]        Your Vitals Were     Pulse Temperature Respirations Pulse Oximetry          88 98.5  F (36.9  C) (Tympanic) 16 92%         Blood Pressure from Last 3 Encounters:   11/14/17 (!) 196/96   01/27/17 144/86   01/23/17 159/90    Weight from Last 3 Encounters:   01/27/17 244 lb (110.7 kg)   01/23/17 245 lb 3.2 oz (111.2 kg)   01/04/17 249 lb (112.9 kg)              We Performed the Following     EKG 12-lead complete w/read - Clinics        Primary Care Provider Office Phone # Fax #    Liborio Mercer [phone] [address] [facility] [address]        Equal Access to Services     [facility]: Hadii mahin sheffield hadasho Soomaali, waaxda luqadaha, qaybta kaalmada adeegyada, yanick flores . So [facility] [phone].    ATENCIÓN: Si habla español, tiene a atkins disposición servicios gratuitos de asistencia lingüística. Gabby al [phone].    We comply with applicable federal civil rights laws and [place] laws. We do not discriminate on the basis of race, color, national origin, age, disability, sex, sexual orientation, or gender identity.            Thank you!     Thank you for choosing [facility]  for your care. Our goal is always to provide you with excellent care. Hearing back from our patients is one way we can continue to improve our services. Please take a few minutes to complete the written survey that you may receive in the mail after your visit with us. Thank you!             Your Updated Medication List - Protect others around you: Learn how to safely use, store and throw away your medicines at www.disposemymeds.org.          This list is accurate as of: 11/14/17  4:28 PM.  Always use your most recent med list.                   Brand Name Dispense Instructions for use Diagnosis    aspirin 81 MG tablet     90 tablet    Take 1 tablet (81 mg) by mouth daily    Uncontrolled hypertension,  Tobacco use disorder       order for DME     1 Units    Equipment being ordered: compression stockings above knee 1 pair    Cellulitis due to MRSA

## 2017-11-14 NOTE — IP AVS SNAPSHOT
[address] [address]    Phone:  [phone]                                       After Visit Summary   11/14/2017    Liborio Medellin    MRN: [id number]           After Visit Summary Signature Page     I have received my discharge instructions, and my questions have been answered. I have discussed any challenges I see with this plan with the nurse or doctor.    ..........................................................................................................................................  Patient/Patient Representative Signature      ..........................................................................................................................................  Patient Representative Print Name and Relationship to Patient    ..................................................               ................................................  Date                                            Time    ..........................................................................................................................................  Reviewed by Signature/Title    ...................................................              ..............................................  Date                                                            Time

## 2017-11-14 NOTE — PROGRESS NOTES
Liborio Medellin is a 57 year old male who calls with chest pain.     PRESENTING PROBLEM:  Acute pain, diaphoretic, cold and clammy.  He is alert and oriented.      NURSING ASSESSMENT:  Patient complains of chest pain, chest pressure/discomfort and dyspnea.  Onset:  About two weeks ago  Pain is characterized as pressure and pushing feeling   Severity moderate, localized and intermittent  Located epigastric area   Radiates to none.  Duration intermittent.  Associated symptoms SOB, diaphoresis and has felt sick for about a week, low energy.  States has slept for two days and has only had milk to drink, no food intake.  No appetite.  Exacerbated by movement.  Relieved by rest.  Cardiac risk factors: smoker ( states one pack a week), hypertension and overweight.  Associated Medical History: HTN  Allergies: No Known Allergies    MEDICATIONS:  Taking medication(s) as prescribed? No, states quit taking med for blood pressure (HCTZ) about 6 months ago because does not have insurance  Taking over the counter medication(s) N/A  Any medication side effects? Not Applicable    Any barriers to taking medication(s) as prescribed?  No  Medication(s) improving/managing symptoms?  No  Medication reconciliation completed: Yes    Last exam/Treatment:  1-27-17    NURSING PLAN: Huddle with provider, plan includes ambulance to the ER     ASA 81 mg chewable given at 1535.    RECOMMENDED DISPOSITION:  Call 911 -  Will comply with recommendation: Yes.  He does not have insurance and was reluctant to have ambulance called  .      Guideline used:  Telephone Triage Protocols for Nurses    Re Giles RN

## 2017-11-14 NOTE — IP AVS SNAPSHOT
MRN:[id number]                      After Visit Summary   11/14/2017    Liborio Medellin    MRN: [id number]           Thank you!     Thank you for choosing [facility] for your care. Our goal is always to provide you with excellent care. Hearing back from our patients is one way we can continue to improve our services. Please take a few minutes to complete the written survey that you may receive in the mail after you visit with us. Thank you!        Patient Information     Date Of Birth          1960        Designated Caregiver       Most Recent Value    Caregiver    Will someone help with your care after discharge? yes    Name of designated caregiver corky medellin    Phone number of caregiver [phone]    Caregiver address [address] [place] [address]      About your hospital stay     You were admitted on:  November 14, 2017 You last received care in the:  [address] [facility]    You were discharged on:  November 17, 2017        Reason for your hospital stay       You had a major heart attack and had a stent put in.                  Who to Call     For medical emergencies, please call 911.  For non-urgent questions about your medical care, please call your primary care provider or clinic, [phone]          Attending Provider     Provider Specialty    Scotty Dunaway MD Internal Medicine - Interventional Cardiology       Primary Care Provider Office Phone # Fax #    Liborio Mercer [phone]      After Care Instructions     Activity       Your activity upon discharge: activity as tolerated            Diet       Follow this diet upon discharge: Orders Placed This Encounter      Regular Diet Adult                  Follow-up Appointments     Adult [facility] [[email or web address] Follow-up and recommended labs and tests       Follow up with primary care provider, Liborio Mercer, within 7 days for hospital follow- up.  No follow up labs or test are needed.      Appointments on  "[facility] and/or [facility] (with [facility] or [facility] provider or service). Call [phone] if you haven't heard regarding these appointments within 7 days of discharge.            Follow Up and recommended labs and tests       Follow up with primary care provider, Liborio Mercer, within 7 days for hospital follow- up.  No follow up labs or test are needed.                  Additional Services     CARDIAC REHAB REFERRAL       Patient may choose their preference of the site for Cardiac Rehab.                  Pending Results     Date and Time Order Name Status Description    11/17/2017 0731 EKG 12-lead, tracing only Preliminary     11/16/2017 0714 EKG 12-lead, tracing only Preliminary     11/14/2017 1727 MRI Cardiac w/contrast In process             Statement of Approval     Ordered          11/17/17 1317  I have reviewed and agree with all the recommendations and orders detailed in this document.  EFFECTIVE NOW     Approved and electronically signed by:  Elysia Gonzalez MD             Admission Information     Date & Time Provider Department Dept. Phone    11/14/2017 Scotty Dunaway MD [address] [facility] [phone]      Your Vitals Were     Blood Pressure Temperature Respirations Weight Pulse Oximetry BMI (Body Mass Index)    115/61 (BP Location: Right arm) 98.4  F (36.9  C) (Oral) 18 112.4 kg (247 lb 14.4 oz) 97% 36.61 kg/m2      MyChart Information     [employer] lets you send messages to your doctor, view your test results, renew your prescriptions, schedule appointments and more. To sign up, go to [email or web address] . Click on \"Log in\" on the left side of the screen, which will take you to the Welcome page. Then click on \"Sign up Now\" on the right side of the page.     You will be asked to enter the access code listed below, as well as some personal information. Please follow the directions to create your username and password.     Your access code is: [email or web address]  Expires: 2/12/2018  4:28 PM     Your " access code will  in 90 days. If you need help or a new code, please call your [place] clinic or [phone].        Care EveryWhere ID     This is your Care EveryWhere ID. This could be used by other organizations to access your [place] medical records  [id number]        Equal Access to Services     JEANETTECRYSTAL MARTA : Hadii aad ku hadasho Soomaali, waaxda luqadaha, qaybta kaalmada adeegyada, yanick goffjoeflavio ball. So [facility] [phone].    ATENCIÓN: Si habla español, tiene a atkins disposición servicios gratuitos de asistencia lingüística. Llame al [phone].    We comply with applicable federal civil rights laws and [place] laws. We do not discriminate on the basis of race, color, national origin, age, disability, sex, sexual orientation, or gender identity.               Review of your medicines      START taking        Dose / Directions    aspirin 81 MG EC tablet   Used for:  ST elevation myocardial infarction (STEMI), unspecified artery (H)   Replaces:  aspirin 81 MG tablet        Dose:  81 mg   Start taking on:  2017   Take 1 tablet (81 mg) by mouth daily   Quantity:  30 tablet   Refills:  3       atorvastatin 40 MG tablet   Commonly known as:  LIPITOR   Used for:  ST elevation myocardial infarction involving left anterior descending (LAD) coronary artery (H)        Dose:  40 mg   Take 1 tablet (40 mg) by mouth daily   Quantity:  30 tablet   Refills:  3       lisinopril 30 MG tablet   Commonly known as:  PRINIVIL,ZESTRIL   Used for:  ST elevation myocardial infarction involving left anterior descending (LAD) coronary artery (H)        Dose:  30 mg   Start taking on:  2017   Take 1 tablet (30 mg) by mouth daily   Quantity:  30 tablet   Refills:  3       metoprolol 25 MG 24 hr tablet   Commonly known as:  TOPROL-XL   Used for:  ST elevation myocardial infarction involving left anterior descending (LAD) coronary artery (H), ST elevation myocardial infarction (STEMI),  unspecified artery (H)        Dose:  12.5 mg   Take 0.5 tablets (12.5 mg) by mouth daily   Quantity:  15 tablet   Refills:  3       ticagrelor 90 MG tablet   Commonly known as:  BRILINTA   Used for:  ST elevation myocardial infarction involving left anterior descending (LAD) coronary artery (H)        Dose:  90 mg   Take 1 tablet (90 mg) by mouth 2 times daily   Quantity:  60 tablet   Refills:  11         CONTINUE these medicines which have NOT CHANGED        Dose / Directions    order for DME   Used for:  Cellulitis due to MRSA        Equipment being ordered: compression stockings above knee 1 pair   Quantity:  1 Units   Refills:  1         STOP taking     aspirin 81 MG tablet   Replaced by:  aspirin 81 MG EC tablet                Where to get your medicines      These medications were sent to [facility] [place], [place] - [address]  [address] [address]     Phone:  [phone]     aspirin 81 MG EC tablet    atorvastatin 40 MG tablet    lisinopril 30 MG tablet    metoprolol 25 MG 24 hr tablet    ticagrelor 90 MG tablet                Protect others around you: Learn how to safely use, store and throw away your medicines at www.disposemymeds.org.             Medication List: This is a list of all your medications and when to take them. Check marks below indicate your daily home schedule. Keep this list as a reference.      Medications           Morning Afternoon Evening Bedtime As Needed    aspirin 81 MG EC tablet   Take 1 tablet (81 mg) by mouth daily   Start taking on:  11/18/2017   Last time this was given:  81 mg on 11/17/2017  8:33 AM                                   atorvastatin 40 MG tablet   Commonly known as:  LIPITOR   Take 1 tablet (40 mg) by mouth daily   Last time this was given:  40 mg on 11/16/2017  8:22 PM                                   lisinopril 30 MG tablet   Commonly known as:  PRINIVIL,ZESTRIL   Take 1 tablet (30 mg) by mouth daily   Start  taking on:  11/18/2017   Last time this was given:  30 mg on 11/17/2017  8:33 AM                                   metoprolol 25 MG 24 hr tablet   Commonly known as:  TOPROL-XL   Take 0.5 tablets (12.5 mg) by mouth daily                                   order for DME   Equipment being ordered: compression stockings above knee 1 pair                                ticagrelor 90 MG tablet   Commonly known as:  BRILINTA   Take 1 tablet (90 mg) by mouth 2 times daily   Last time this was given:  90 mg on 11/17/2017  8:33 AM

## 2017-11-14 NOTE — PROCEDURES
FINAL CARDIAC CATH REPORT:     PROCEDURES PERFORMED:   Coronary Angiography  Percutaneous Coronary Intervention    PHYSICIANS:  Attending Physician: Scotty Dunaway MD  Interventional Cardiology Fellow: Edgar Pearl MD  Cardiology Fellow: None    INDICATION:  Liborio Medellin is a 57 year old male with risk factor profile (+) HTN, (-) DM, (+) hypercholesterolemia, (+) ~50 pack-year tobacco use, (?) fam Hx premature CAD, who presents on an emergent basis. The clinical presentation was STEMI (CCS IV).  He received  mg en route.  He had been having intermittent chest pain over the past week.    Timing of events:  1544 Dispatch  1546 En Route  1552 @ [facility] ([facility])  1607 Transport  1640 Arrival  1711 Device    DESCRIPTION:  1. Consent obtained with discussion of risks.  All questions were answered.  2. Sterile prep and procedure.  3. Location with Sheaths:   RT Radial Arterial  6 Fr  10 cm [short]  4. Access: Local anesthetic with lidocaine.  A micropuncture 21 guage needle with ultrasound guidance was used to establish vascular access using a modified Seldinger technique.  5. Diagnostic Catheters:   None  6. Guiding Catheters:  6 Fr  Ikari RT 1.0  6. Estimated blood loss: < 5 ml    MEDICATIONS:  The procedure was performed under conscious sedation for 73 minutes from 453 to 606.  The patient was assessed immediately before the first sedation medication was administered.  Midazolam 2 mg and Fentanyl 100 mcg were administered.  Heart rate, BP, respiration, oxygen saturation and patient responses were monitored throughout the procedure with the assistance of the RN under my supervision.  >> IA Verapamil and IA NTG were administered to prophylax against radial artery spasm.  >> Antiplatelet Therapy:  mg    Procedures:    CORONARY ANGIOGRAM:   1. Both coronary arteries arise from their respective cusps.  2. Dominance: Right  3. The LM is without angiographic evidence of disease.    4. LAD: Type 3 [LAD supplies the entire apex]. The LAD gives rise to septal perforators, small D1,and medium sized D2/3.  The pLAD has a 95% acute thrombotic lesion (culprit lesion), mLAD has a 25% stenosis, dLAD has a 30% stenosis.  D2 has a 30% proximal area of stenosis. There were signs of distal embolization of the acute thrombus, the apical LAD was 100% occluded in the very distal segment.  5. LCX gives rise to a large OM1 and medium sized OM2.  There is a small OM3.  The LCx and associated branches are without evidence of disease.  6. RCA gives rise to PL branches and supplies PDA. The pRCA has a 0% stenosis, mRCA has a 0% stenosis, dRCA has a 0% stenosis, RPDA has a 0% stenosis and RPLA has a 0% stenosis.      PERCUTANEOUS CORONARY INTERVENTION:   Lesion #1:  LAD: proximal  A 6 Fr Ikari Left 3.5 was positioned at the ostium of the LMCA.  IV UFH was administered to achieve anticoagulation.    A PT2 wire was advanced across the proximal LAD lesion and positioned in the distal vessel. Aspiration thrombectomy was performed via a 6 Fr Quick Cat Extraction Catheter was used to extract 40 cc with two passes at the lesion.  A 2.5*12mm Emerge balloon was used to pre-dilate the lesion. Following that, a 4.0*16mm Synergy drug eluting stent was successfully deployed across the proximal LAD.  The stent was post-dilated with a 4.5*8 mm non-compliant balloon.  Final angiography showed no evidence of perforation or dissection with residual stenosis of 0% and MONSERRAT 3 flow.  No complications.    To address the apical acute occlusion, the PT2 wire was passed to the apical LAD. A 2.0*8 mm Emerge balloon was passed to the apical LAD ([place]) with 1 low pressure inflation to 4 tamar.  While a wire could be passed distally, we were not able to reestablish flow in the apical LAD.  The patient was placed on a G2B3A inhibitor (reopro).     VF Arrest:    Following access the patient had an episode of VF arrest for which a single  shock was delivered.  Afterwards he was with hemodynamically stable throughout the rest of the case.    Sheath Removal:  The right radial artery sheath was manually removed in the cardiac catheterization laboratory.    Contrast: Isovue, 270 ml     Fluoroscopy Time: 24.1 min    COMPLICATIONS:  1. None    SUMMARY:   1. Acute anterior STEMI  2. Single vessel CAD (ostial LAD)  3. Successful deployment of a JESSICA to the proximal LAD  4. Embolization of thrombus to the apical LAD (pre-intervention).                                                      PLAN:   >> ASA 81 mg qd and Ticagrelor 90 mg BID   >> Reopro x 12 hours  >> Bedrest per protocol  >> Continued medical management and lifestyle modification for cardiovascular risk factor optimization.   >> Admit to inpatient 4E  >> Cardiac MRI tomorrow    The attending interventional cardiologist was present and supervised all critical aspects the procedure.    Findings discussed with patient/admitting team.    See CVIS report for final draft.    Edgar Pearl MD  Interventional Cardiology Fellow        Staff Cardiologist: I supervised the cardiology fellow and reviewed the coronary angiography and interventional findings with the fellow at the completion of the procedure.  I made edits and agree with the documentation above.    Scotty Dunaway MD    Cardiology Staff

## 2017-11-15 LAB
ANION GAP SERPL CALCULATED.3IONS-SCNC: 7 MMOL/L (ref 3–14)
APTT PPP: 24 SEC (ref 22–37)
BUN SERPL-MCNC: 20 MG/DL (ref 7–30)
CALCIUM SERPL-MCNC: 8.3 MG/DL (ref 8.5–10.1)
CHLORIDE SERPL-SCNC: 106 MMOL/L (ref 94–109)
CHOLEST SERPL-MCNC: 140 MG/DL
CO2 SERPL-SCNC: 25 MMOL/L (ref 20–32)
CREAT SERPL-MCNC: 1.08 MG/DL (ref 0.66–1.25)
ERYTHROCYTE [DISTWIDTH] IN BLOOD BY AUTOMATED COUNT: 14.1 % (ref 10–15)
GFR SERPL CREATININE-BSD FRML MDRD: 70 ML/MIN/1.7M2
GLUCOSE SERPL-MCNC: 134 MG/DL (ref 70–99)
HCT VFR BLD AUTO: 40.8 % (ref 40–53)
HDLC SERPL-MCNC: 50 MG/DL
HGB BLD-MCNC: 13.1 G/DL (ref 13.3–17.7)
INR PPP: 1.05 (ref 0.86–1.14)
INTERPRETATION ECG - MUSE: NORMAL
LDLC SERPL CALC-MCNC: 77 MG/DL
MCH RBC QN AUTO: 28.7 PG (ref 26.5–33)
MCHC RBC AUTO-ENTMCNC: 32.1 G/DL (ref 31.5–36.5)
MCV RBC AUTO: 89 FL (ref 78–100)
NONHDLC SERPL-MCNC: 90 MG/DL
PLATELET # BLD AUTO: 293 10E9/L (ref 150–450)
POTASSIUM SERPL-SCNC: 3.9 MMOL/L (ref 3.4–5.3)
RBC # BLD AUTO: 4.57 10E12/L (ref 4.4–5.9)
SODIUM SERPL-SCNC: 139 MMOL/L (ref 133–144)
TRIGL SERPL-MCNC: 65 MG/DL
TROPONIN I SERPL-MCNC: 10.01 UG/L (ref 0–0.04)
WBC # BLD AUTO: 9.9 10E9/L (ref 4–11)

## 2017-11-15 PROCEDURE — 25000128 H RX IP 250 OP 636

## 2017-11-15 PROCEDURE — 25000132 ZZH RX MED GY IP 250 OP 250 PS 637

## 2017-11-15 PROCEDURE — 85730 THROMBOPLASTIN TIME PARTIAL: CPT | Performed by: INTERNAL MEDICINE

## 2017-11-15 PROCEDURE — 25000132 ZZH RX MED GY IP 250 OP 250 PS 637: Performed by: INTERNAL MEDICINE

## 2017-11-15 PROCEDURE — 84484 ASSAY OF TROPONIN QUANT: CPT | Performed by: INTERNAL MEDICINE

## 2017-11-15 PROCEDURE — 99233 SBSQ HOSP IP/OBS HIGH 50: CPT | Mod: GC | Performed by: INTERNAL MEDICINE

## 2017-11-15 PROCEDURE — 36415 COLL VENOUS BLD VENIPUNCTURE: CPT | Performed by: INTERNAL MEDICINE

## 2017-11-15 PROCEDURE — 85610 PROTHROMBIN TIME: CPT | Performed by: INTERNAL MEDICINE

## 2017-11-15 PROCEDURE — 25000132 ZZH RX MED GY IP 250 OP 250 PS 637: Performed by: STUDENT IN AN ORGANIZED HEALTH CARE EDUCATION/TRAINING PROGRAM

## 2017-11-15 PROCEDURE — 93010 ELECTROCARDIOGRAM REPORT: CPT | Performed by: INTERNAL MEDICINE

## 2017-11-15 PROCEDURE — 80061 LIPID PANEL: CPT | Performed by: INTERNAL MEDICINE

## 2017-11-15 PROCEDURE — 85027 COMPLETE CBC AUTOMATED: CPT | Performed by: INTERNAL MEDICINE

## 2017-11-15 PROCEDURE — 21400006 ZZH R&B CCU INTERMEDIATE UMMC

## 2017-11-15 PROCEDURE — 25000128 H RX IP 250 OP 636: Performed by: INTERNAL MEDICINE

## 2017-11-15 PROCEDURE — 93005 ELECTROCARDIOGRAM TRACING: CPT

## 2017-11-15 PROCEDURE — 80048 BASIC METABOLIC PNL TOTAL CA: CPT | Performed by: INTERNAL MEDICINE

## 2017-11-15 RX ORDER — CARVEDILOL 3.12 MG/1
12.5 TABLET ORAL 2 TIMES DAILY WITH MEALS
Status: DISCONTINUED | OUTPATIENT
Start: 2017-11-15 | End: 2017-11-17 | Stop reason: HOSPADM

## 2017-11-15 RX ORDER — LANOLIN ALCOHOL/MO/W.PET/CERES
3-6 CREAM (GRAM) TOPICAL
Status: DISCONTINUED | OUTPATIENT
Start: 2017-11-15 | End: 2017-11-17 | Stop reason: HOSPADM

## 2017-11-15 RX ORDER — LISINOPRIL 20 MG/1
20 TABLET ORAL DAILY
Status: DISCONTINUED | OUTPATIENT
Start: 2017-11-16 | End: 2017-11-16

## 2017-11-15 RX ORDER — MORPHINE SULFATE 2 MG/ML
1 INJECTION, SOLUTION INTRAMUSCULAR; INTRAVENOUS ONCE
Status: COMPLETED | OUTPATIENT
Start: 2017-11-15 | End: 2017-11-15

## 2017-11-15 RX ORDER — FLUTICASONE PROPIONATE 50 MCG
2 SPRAY, SUSPENSION (ML) NASAL DAILY
Status: DISCONTINUED | OUTPATIENT
Start: 2017-11-15 | End: 2017-11-17 | Stop reason: HOSPADM

## 2017-11-15 RX ORDER — MORPHINE SULFATE 2 MG/ML
INJECTION, SOLUTION INTRAMUSCULAR; INTRAVENOUS
Status: COMPLETED
Start: 2017-11-15 | End: 2017-11-15

## 2017-11-15 RX ORDER — LISINOPRIL 10 MG/1
20 TABLET ORAL DAILY
Status: DISCONTINUED | OUTPATIENT
Start: 2017-11-16 | End: 2017-11-15

## 2017-11-15 RX ORDER — LISINOPRIL 10 MG/1
10 TABLET ORAL ONCE
Status: COMPLETED | OUTPATIENT
Start: 2017-11-15 | End: 2017-11-15

## 2017-11-15 RX ADMIN — CARVEDILOL 12.5 MG: 12.5 TABLET, FILM COATED ORAL at 09:41

## 2017-11-15 RX ADMIN — MELATONIN TAB 3 MG 3 MG: 3 TAB at 22:27

## 2017-11-15 RX ADMIN — ATORVASTATIN CALCIUM 40 MG: 40 TABLET, FILM COATED ORAL at 20:36

## 2017-11-15 RX ADMIN — MORPHINE SULFATE 1 MG: 2 INJECTION, SOLUTION INTRAMUSCULAR; INTRAVENOUS at 05:12

## 2017-11-15 RX ADMIN — ACETAMINOPHEN 650 MG: 325 TABLET, FILM COATED ORAL at 10:20

## 2017-11-15 RX ADMIN — TICAGRELOR 90 MG: 90 TABLET ORAL at 18:10

## 2017-11-15 RX ADMIN — NITROGLYCERIN 1.6 MCG/KG/MIN: 20 INJECTION INTRAVENOUS at 08:58

## 2017-11-15 RX ADMIN — NITROGLYCERIN 2 MCG/KG/MIN: 20 INJECTION INTRAVENOUS at 01:41

## 2017-11-15 RX ADMIN — ACETAMINOPHEN 650 MG: 325 TABLET, FILM COATED ORAL at 00:42

## 2017-11-15 RX ADMIN — ASPIRIN 81 MG: 81 TABLET, COATED ORAL at 07:59

## 2017-11-15 RX ADMIN — LISINOPRIL 10 MG: 10 TABLET ORAL at 07:59

## 2017-11-15 RX ADMIN — TICAGRELOR 90 MG: 90 TABLET ORAL at 05:13

## 2017-11-15 RX ADMIN — LISINOPRIL 10 MG: 10 TABLET ORAL at 09:42

## 2017-11-15 RX ADMIN — CARVEDILOL 12.5 MG: 12.5 TABLET, FILM COATED ORAL at 18:10

## 2017-11-15 RX ADMIN — FLUTICASONE PROPIONATE 2 SPRAY: 50 SPRAY, METERED NASAL at 20:36

## 2017-11-15 RX ADMIN — NITROGLYCERIN 2 MCG/KG/MIN: 20 INJECTION INTRAVENOUS at 05:03

## 2017-11-15 ASSESSMENT — PAIN DESCRIPTION - DESCRIPTORS
DESCRIPTORS: DULL
DESCRIPTORS: DISCOMFORT;DULL
DESCRIPTORS: DISCOMFORT;DULL

## 2017-11-15 NOTE — PROGRESS NOTES
CLINICAL NUTRITION SERVICES    Reason for Assessment:  Heart-healthy nutrition education, received consult.    Diet History:  patient was soundly sleeping when this writer attempted to visit. The below handouts were left on table side      Nutrition Diagnosis:  No Dx at this time as patient was soundly sleeping when this writer attempted to visit     Nutrition Prescription/Recs:  Continue heart-healthy diet.      Interventions:  Nutrition Education  1.  Provided verbal instruction on a heart-healthy diet.  2.  Provided handouts:  Heart Health Guidelines (includes Heart Healthy Food Choices), Your Heart-Healthy Diet (Words You Will Hear), 10 Tips for Heart-Healthy Cooking, Dining Out With Your Heart In Mind, Recipe Changes for Heart-Healthy Cooking,  How to Read Nutrition Labels, Low-Sodium Foods and Drinks, Tips for a Low-Sodium Diet, and Seasoning Your Foods Without Adding Salt.      Goals:    1.  Pt will verbalize at least four foods high in saturated or trans-fats and five foods high in sodium.    2.  Pt will list at least two interventions to make current meal plan more heart-healthy.     Follow-up:   Patient to ask any further nutrition-related questions before discharge.  In addition, pt may request outpatient RD appointment.    Jayna Conti MS/GLENN/AMANDA/CNSC  4E RD Pager: [phone]

## 2017-11-15 NOTE — CONSULTS
"[facility] Inpatient Consult Dermatology Note    Impression/Plan:  1. Chronic stasis dermatitis with acute flare on right lower leg:  Does not appear consistent with cellulitis clinically or by history.  Recommended triamcinolone 0.1% ointment applied BID when itch or rash present on lower legs (ask pharmacy to supply 80g tube to have at patient's bedside). Can also be occluded overnight with saran wrap or other occlusive dressing to increase potency.  Please rx at discharge for patient.  Ultimately, compression stocking use daily is the most successful treatment to prevent flares of dermatitis by treating swelling. Avoid use of any over the counter antibiotic ointments on the skin as venous stasis dermatitis is prone to developing contact allergic reactions.    2.  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  To prevent new lesions from forming, instruct patient at discharge to purchase over the counter benzoyl peroxide 5% wash and use this daily in the shower to decrease bacteria counts from the skin.  An alternative method is to soak in a tub with 1/2 cup nonconcentrated Clorox bleach diluted in full tub for 10-15 minutes 2-3 times weekly.    Patient seen and examined with Dr. Mae.    Thank you for the dermatology consultation. We will sign off.    Please do not hesitate to contact the dermatology resident/faculty on call for any additional questions or concerns.     Keyla Peñaloza MD  PGY-4, Dermatology  [phone]    Dermatology Problem List:  1. Stasis dermatitis  2. Recurrent abscesses/folliculitis    Date of Admission: Nov 14, 2017   Encounter Date: 11/15/2017     Reason for Consultation:   \"Leg rash and lesions, patient's family requested.\"    History of Present Illness:  Mr. Liborio Medellin is a 57 year old male with history of hypertension who was admitted 11/14 " "for STEMI requiring JESSICA to the LAD. Dermatology was consulted for \"leg rash and lesions.\"     Liborio notes that for more than one year he has had an itchy rash on his lower legs bilaterally.  This seems to come and go.  At times, it is so itchy that he rips the skin scratching.  Right now, his right leg is more affected, but his left leg has been affected in the past.  He thinks he has seen bugs worming underneath his skin behind his knee and at his wrist in the past.      Liborio notes that he gets severe swelling of his legs with activity.  He has tried compression stockings once before but felt they caused skin break down.      Liborio notes that he develops pimple like bumps on his legs that he picks at because they itch.  They enlarge in size then crust over and resolve.  He does not use any antibacterial washes at present.  He does not have a bath tub at home.    No other skin concerns.    Past Medical History:   Patient Active Problem List   Diagnosis     Cellulitis due to MRSA     Advanced directives, counseling/discussion     Tobacco use disorder     Uncontrolled hypertension     STEMI involving left anterior descending coronary artery (H)     History reviewed. No pertinent past medical history.  No past surgical history on file.      Social History:  Not assessed.    Family History:  Not assessed.    Medications:  Current Facility-Administered Medications   Medication     [START ON 11/16/2017] lisinopril (PRINIVIL/ZESTRIL) tablet 20 mg     carvedilol (COREG) tablet 12.5 mg     fluticasone (VERAMYST) spray 2 spray     lidocaine 1 % 1 mL     lidocaine (LMX4) kit     sodium chloride (PF) 0.9% PF flush 3 mL     sodium chloride (PF) 0.9% PF flush 3 mL     HOLD: Metformin and metformin containing medications on day of procedure and 48 hours after IV contrast given     nitroGLYcerin (NITROSTAT) sublingual tablet 0.4 mg     naloxone (NARCAN) injection 0.1-0.4 mg     hydrALAZINE (APRESOLINE) injection 10 mg     aspirin " EC EC tablet 81 mg     atorvastatin (LIPITOR) tablet 40 mg     acetaminophen (TYLENOL) tablet 325-650 mg     HYDROcodone-acetaminophen (NORCO) 5-325 MG per tablet 1-2 tablet     ondansetron (ZOFRAN-ODT) ODT tab 4 mg    Or     ondansetron (ZOFRAN) injection 4 mg     medication instruction - medications to avoid while on abciximab (REOPRO) therapy     No IM or Subcutaneous injections during abciximab (REOPRO) infusion [except Subcutaneous insulin and enoxaparin (LOVENOX)]     ticagrelor (BRILINTA) tablet 90 mg     nitroGLYcerin 50 mg in D5W 250 mL (adult std) infusion          No Known Allergies      Review of Systems:  -Constitutional:  Feeling well, in usual state of health.  -Skin:  As per HPI, no additional concerns.  10 point ROS otherwise negative    Physical exam:  Vitals: /70  Temp 97.9  F (36.6  C)  Resp 11  Wt 111.6 kg (246 lb 0.5 oz)  SpO2 98%  BMI 36.33 kg/m2  GEN: This is a well developed, well-nourished male in no acute distress, in a pleasant mood.  He appears comfortable and does not appear acutely ill.  SKIN: Full skin excluding included the head/face, neck, both arms, chest, back, abdomen, both legs, digits and/or nails, buttocks, and groin was completed.  -Crusted circular pink papules with surrounding peripheral scale x2 on the left anterior thigh.  -Pink erythema extending from the proximal right lower legs to the ankle.  The anterior is slightly more pink than the posterior.  There is no associated warmth.  -Numerous excoriations on the lower legs bilaterally.  -1-2+ pitting edema bilaterally.  -No other lesions of concern on areas examined.     Laboratory:  Results for orders placed or performed during the hospital encounter of 11/14/17 (from the past 24 hour(s))   Activated clotting time POCT   Result Value Ref Range    Activated Clot Time 253 (H) 105 - 167 sec   Activated clotting time POCT   Result Value Ref Range    Activated Clot Time 249 (H) 105 - 167 sec   ISTAT gases elec ica  gluc art POCT   Result Value Ref Range    pH Arterial 7.34 (L) 7.35 - 7.45 pH    pCO2 Arterial 43 35 - 45 mm Hg    pO2 Arterial 131 (H) 80 - 105 mm Hg    Bicarbonate Arterial 23 21 - 28 mmol/L    O2 Sat Arterial 99 92 - 100 %    Sodium 137 133 - 144 mmol/L    Potassium 4.3 3.4 - 5.3 mmol/L    Glucose 144 (H) 70 - 99 mg/dL    Calcium Ionized 4.7 4.4 - 5.2 mg/dL    Hemoglobin 13.3 13.3 - 17.7 g/dL    Hematocrit - POCT 39 (L) 40.0 - 53.0 %PCV   Coronary Angiography Adult Order    Narrative    The results/report for this Cardiology exam is scanned in to [employer] and can   be found under the Media Tab in the Chart Review activity.         EKG 12-lead, tracing only    Result Value Ref Range    Interpretation ECG Click View Image link to view waveform and result    Troponin I   Result Value Ref Range    Troponin I ES 2.000 (HH) 0.000 - 0.045 ug/L   Magnesium (1200)   Result Value Ref Range    Magnesium 2.3 1.6 - 2.3 mg/dL   CBC with platelets   Result Value Ref Range    WBC 14.7 (H) 4.0 - 11.0 10e9/L    RBC Count 4.97 4.4 - 5.9 10e12/L    Hemoglobin 14.4 13.3 - 17.7 g/dL    Hematocrit 44.7 40.0 - 53.0 %    MCV 90 78 - 100 fl    MCH 29.0 26.5 - 33.0 pg    MCHC 32.2 31.5 - 36.5 g/dL    RDW 13.8 10.0 - 15.0 %    Platelet Count 243 150 - 450 10e9/L   Basic metabolic panel   Result Value Ref Range    Sodium 139 133 - 144 mmol/L    Potassium 4.0 3.4 - 5.3 mmol/L    Chloride 106 94 - 109 mmol/L    Carbon Dioxide 26 20 - 32 mmol/L    Anion Gap 7 3 - 14 mmol/L    Glucose 132 (H) 70 - 99 mg/dL    Urea Nitrogen 21 7 - 30 mg/dL    Creatinine 1.19 0.66 - 1.25 mg/dL    GFR Estimate 63 >60 mL/min/1.7m2    GFR Estimate If Black 76 >60 mL/min/1.7m2    Calcium 8.4 (L) 8.5 - 10.1 mg/dL   EKG 12-lead, tracing only - STAT   Result Value Ref Range    Interpretation ECG Click View Image link to view waveform and result    Troponin I   Result Value Ref Range    Troponin I ES 10.007 () 0.000 - 0.045 ug/L   Partial thromboplastin time   Result Value  Ref Range    PTT 24 22 - 37 sec   Lipid panel   Result Value Ref Range    Cholesterol 140 <200 mg/dL    Triglycerides 65 <150 mg/dL    HDL Cholesterol 50 >39 mg/dL    LDL Cholesterol Calculated 77 <100 mg/dL    Non HDL Cholesterol 90 <130 mg/dL   INR (Reopro)   Result Value Ref Range    INR 1.05 0.86 - 1.14   CBC with platelets (Reopro)   Result Value Ref Range    WBC 9.9 4.0 - 11.0 10e9/L    RBC Count 4.57 4.4 - 5.9 10e12/L    Hemoglobin 13.1 (L) 13.3 - 17.7 g/dL    Hematocrit 40.8 40.0 - 53.0 %    MCV 89 78 - 100 fl    MCH 28.7 26.5 - 33.0 pg    MCHC 32.1 31.5 - 36.5 g/dL    RDW 14.1 10.0 - 15.0 %    Platelet Count 293 150 - 450 10e9/L   Basic metabolic panel   Result Value Ref Range    Sodium 139 133 - 144 mmol/L    Potassium 3.9 3.4 - 5.3 mmol/L    Chloride 106 94 - 109 mmol/L    Carbon Dioxide 25 20 - 32 mmol/L    Anion Gap 7 3 - 14 mmol/L    Glucose 134 (H) 70 - 99 mg/dL    Urea Nitrogen 20 7 - 30 mg/dL    Creatinine 1.08 0.66 - 1.25 mg/dL    GFR Estimate 70 >60 mL/min/1.7m2    GFR Estimate If Black 85 >60 mL/min/1.7m2    Calcium 8.3 (L) 8.5 - 10.1 mg/dL   EKG 12-lead, tracing only    Result Value Ref Range    Interpretation ECG Click View Image link to view waveform and result        Dr. Mae staffed the patient.    Staff Involved:  Resident(Keyla Peñaloza)/Staff(as above)

## 2017-11-15 NOTE — PLAN OF CARE
Problem: Patient Care Overview  Goal: Plan of Care/Patient Progress Review  Outcome: No Change  Pt admitted to 4E from Cath lab post-stent placement.   Neuro: A/o x 4.   CV: Pt c/o increased CP around 2100. Troponin 2.0, MD notified. EKG obtained. NTG gtt titrated to max ordered dose. Morphine x 2. Pt reported some relief. SBP's: 's. Troponin increased to 10.007 MD notified. In and out of SR and junctional rhythm HR: 60-70's.   Resp: On 4 L O2 for comfort, c/o dyspnea with CP. LS clear.   GI: clear liquids, tolerating well.   : Urinating spontaneously without difficulty.   Skin: TR bands to RRA removed at 2100. Prior hematoma noted above site, SFD. Some oozing noted, quick clot and tegaderm applied. Site remains SFD. Bilat LE's otoniel/dusky. Face and hands flushed/dusky. MD aware. Will continue to monitor and notify team with concerns.

## 2017-11-16 LAB
ANION GAP SERPL CALCULATED.3IONS-SCNC: 8 MMOL/L (ref 3–14)
BUN SERPL-MCNC: 20 MG/DL (ref 7–30)
CALCIUM SERPL-MCNC: 8.6 MG/DL (ref 8.5–10.1)
CHLORIDE SERPL-SCNC: 106 MMOL/L (ref 94–109)
CO2 SERPL-SCNC: 22 MMOL/L (ref 20–32)
CREAT SERPL-MCNC: 0.96 MG/DL (ref 0.66–1.25)
ERYTHROCYTE [DISTWIDTH] IN BLOOD BY AUTOMATED COUNT: 14.4 % (ref 10–15)
GFR SERPL CREATININE-BSD FRML MDRD: 81 ML/MIN/1.7M2
GLUCOSE SERPL-MCNC: 110 MG/DL (ref 70–99)
HCT VFR BLD AUTO: 39.9 % (ref 40–53)
HGB BLD-MCNC: 12.8 G/DL (ref 13.3–17.7)
INR PPP: 1.12 (ref 0.86–1.14)
MCH RBC QN AUTO: 28.9 PG (ref 26.5–33)
MCHC RBC AUTO-ENTMCNC: 32.1 G/DL (ref 31.5–36.5)
MCV RBC AUTO: 90 FL (ref 78–100)
PLATELET # BLD AUTO: 265 10E9/L (ref 150–450)
POTASSIUM SERPL-SCNC: 3.9 MMOL/L (ref 3.4–5.3)
RBC # BLD AUTO: 4.43 10E12/L (ref 4.4–5.9)
SODIUM SERPL-SCNC: 136 MMOL/L (ref 133–144)
TROPONIN I SERPL-MCNC: 12.96 UG/L (ref 0–0.04)
WBC # BLD AUTO: 8.4 10E9/L (ref 4–11)

## 2017-11-16 PROCEDURE — 93005 ELECTROCARDIOGRAM TRACING: CPT

## 2017-11-16 PROCEDURE — 84484 ASSAY OF TROPONIN QUANT: CPT | Performed by: INTERNAL MEDICINE

## 2017-11-16 PROCEDURE — 80048 BASIC METABOLIC PNL TOTAL CA: CPT | Performed by: INTERNAL MEDICINE

## 2017-11-16 PROCEDURE — 25000132 ZZH RX MED GY IP 250 OP 250 PS 637: Performed by: STUDENT IN AN ORGANIZED HEALTH CARE EDUCATION/TRAINING PROGRAM

## 2017-11-16 PROCEDURE — 25000132 ZZH RX MED GY IP 250 OP 250 PS 637: Performed by: INTERNAL MEDICINE

## 2017-11-16 PROCEDURE — 99233 SBSQ HOSP IP/OBS HIGH 50: CPT | Mod: GC | Performed by: INTERNAL MEDICINE

## 2017-11-16 PROCEDURE — 25000132 ZZH RX MED GY IP 250 OP 250 PS 637

## 2017-11-16 PROCEDURE — 21400006 ZZH R&B CCU INTERMEDIATE UMMC

## 2017-11-16 PROCEDURE — 85027 COMPLETE CBC AUTOMATED: CPT | Performed by: INTERNAL MEDICINE

## 2017-11-16 PROCEDURE — 85610 PROTHROMBIN TIME: CPT | Performed by: INTERNAL MEDICINE

## 2017-11-16 PROCEDURE — 93010 ELECTROCARDIOGRAM REPORT: CPT | Performed by: INTERNAL MEDICINE

## 2017-11-16 RX ORDER — TRIAMCINOLONE ACETONIDE 1 MG/G
CREAM TOPICAL 3 TIMES DAILY
Status: DISCONTINUED | OUTPATIENT
Start: 2017-11-16 | End: 2017-11-16

## 2017-11-16 RX ORDER — TRIAMCINOLONE ACETONIDE 1 MG/G
CREAM TOPICAL 3 TIMES DAILY
Status: DISCONTINUED | OUTPATIENT
Start: 2017-11-16 | End: 2017-11-17 | Stop reason: HOSPADM

## 2017-11-16 RX ADMIN — ATORVASTATIN CALCIUM 40 MG: 40 TABLET, FILM COATED ORAL at 20:22

## 2017-11-16 RX ADMIN — FLUTICASONE PROPIONATE 2 SPRAY: 50 SPRAY, METERED NASAL at 20:22

## 2017-11-16 RX ADMIN — ASPIRIN 81 MG: 81 TABLET, COATED ORAL at 09:10

## 2017-11-16 RX ADMIN — TICAGRELOR 90 MG: 90 TABLET ORAL at 06:30

## 2017-11-16 RX ADMIN — CARVEDILOL 12.5 MG: 12.5 TABLET, FILM COATED ORAL at 09:10

## 2017-11-16 RX ADMIN — CARVEDILOL 12.5 MG: 12.5 TABLET, FILM COATED ORAL at 17:02

## 2017-11-16 RX ADMIN — TICAGRELOR 90 MG: 90 TABLET ORAL at 17:03

## 2017-11-16 RX ADMIN — TRIAMCINOLONE ACETONIDE: 1 CREAM TOPICAL at 18:43

## 2017-11-16 RX ADMIN — LISINOPRIL 30 MG: 20 TABLET ORAL at 09:55

## 2017-11-16 RX ADMIN — HYDROCODONE BITARTRATE AND ACETAMINOPHEN 1 TABLET: 5; 325 TABLET ORAL at 01:20

## 2017-11-16 RX ADMIN — MELATONIN TAB 3 MG 3 MG: 3 TAB at 01:20

## 2017-11-16 NOTE — PLAN OF CARE
Problem: Patient Care Overview  Goal: Plan of Care/Patient Progress Review  Outcome: Improving  RA, with 6C tx orders. Report given to RN. RA sats high 90s. Lung sounds clear. SBP < 150, NTG turned off around 1pm, tolerating well. Voiding 500cc this shift, using urinal. Regular diet, ambulating stand by assist. Cont POC.

## 2017-11-16 NOTE — PLAN OF CARE
Problem: Patient Care Overview  Goal: Plan of Care/Patient Progress Review  Outcome: Improving  Transfer 2100    Pt transferred from  at 2100. Pt arrived via bed. Pt alert and oriented. Pt in SR rates in the 60's. Pt's VSS. Pt's lungs clear. Pt noted to have nasal congestion improving with Flonase. Pt has 3 PIV SL. Pt denies any c/o pain. Pt up independently to BR. Pt had BM. Pt's admission documentation completed. Pt arrived yesterday with a active stemi , rushed to cardiac cath lab where he received a JESSICA to LAD. Pt did have 1 episode of VFIB requiring 1 shock. Pt has PMH of HTN and smoking history. Pt was seen by dermatology for LE scratches and reddened area felt to be caused by LE edema and folliculitis, felt to be staph colonized.Creams ordered and recommendations written. POC pt to have cardiac MRI tomorrow.     Problem: Cardiac: ACS (Acute Coronary Syndrome) (Adult)  Goal: Signs and Symptoms of Listed Potential Problems Will be Absent, Minimized or Managed (Cardiac: ACS)  Signs and symptoms of listed potential problems will be absent, minimized or managed by discharge/transition of care (reference Cardiac: ACS (Acute Coronary Syndrome) (Adult) CPG).   Outcome: Improving   11/15/17 [id number]   Cardiac: ACS (Acute Coronary Syndrome)   Problems Assessed (Acute Coronary Syndrome) all   Problems Present (Acute Coronary Syn) cardiovascular structural defects

## 2017-11-16 NOTE — PROGRESS NOTES
[facility] Cardiology Progress Note           Assessment and Plan:     Liborio Medellin is a 58yo male patient with PMH notable for HTN, tobacco use who presented to [facility] with STEMI s/p PCI with JESSICA to LAD.       #STEMI s/p JESSICA to pLAD  1x week of CP. RF: HTN, tobacco. During procedure had 1x episode of VF arrest which recovered with 1x shock.   - 180mg brillinta in cath lab - 90mg BID to start today  - reopro gtt to continue for 12 h after intervention  - nitro gtt as needed for pain; goal SBP <150   - holding BB for now given HR 55-60  - atorvastatin 40mg QHS  - s/p 325mg ASA, 81mg QD to start tomorrow   - lipid panel, A1C pending  - telemetry   - cardiac MRI today.  - lisinopril 30 mg today.      #HTN - had previously been in diuril, not taking now due to insurance reasons.     FEN:  - ADAT  - replete lytes K 4, MG 2  PPX: brillinta, asa  Code Status: Full Code          Elysia Gonzalez MD   Cardiology Fellow  [phone]        Subjective:     Patient denies current chest pain, dyspnea on exertion, orthopnea, PND, lightheadedness, or palpitations.           Review of Systems:   A comprehensive review of systems was performed and found to be negative except as described in this note          Medications:     Current Facility-Administered Medications   Medication     lisinopril (PRINIVIL/ZESTRIL) tablet 30 mg     Percutaneous Coronary Intervention orders placed (this is information for BPA alerting)     carvedilol (COREG) tablet 12.5 mg     fluticasone (FLONASE) 50 MCG/ACT spray 2 spray     melatonin tablet 3-6 mg     influenza quadrivalent (PF) vacc age 3 yrs and older (FLUZONE or Flulaval) injection 0.5 mL     pneumococcal vaccine (PNEUMOVAX 23-mark) injection 0.5 mL     lidocaine 1 % 1 mL     lidocaine (LMX4) kit     sodium chloride (PF) 0.9% PF flush 3 mL     sodium chloride (PF) 0.9% PF flush 3 mL     HOLD: Metformin and metformin containing medications on day of procedure and 48 hours after IV contrast  "given     nitroGLYcerin (NITROSTAT) sublingual tablet 0.4 mg     naloxone (NARCAN) injection 0.1-0.4 mg     hydrALAZINE (APRESOLINE) injection 10 mg     aspirin EC EC tablet 81 mg     atorvastatin (LIPITOR) tablet 40 mg     acetaminophen (TYLENOL) tablet 325-650 mg     HYDROcodone-acetaminophen (NORCO) 5-325 MG per tablet 1-2 tablet     ondansetron (ZOFRAN-ODT) ODT tab 4 mg    Or     ondansetron (ZOFRAN) injection 4 mg     medication instruction - medications to avoid while on abciximab (REOPRO) therapy     No IM or Subcutaneous injections during abciximab (REOPRO) infusion [except Subcutaneous insulin and enoxaparin (LOVENOX)]     ticagrelor (BRILINTA) tablet 90 mg     nitroGLYcerin 50 mg in D5W 250 mL (adult std) infusion               Objective:   Vital signs:  Temp: 98.1  F (36.7  C) Temp src: Oral BP: 121/73   Heart Rate: 59 Resp: 16 SpO2: 100 % O2 Device: None (Room air) Oxygen Delivery: 3 LPM   Weight: 112.7 kg (248 lb 8 oz)  Estimated body mass index is 36.7 kg/(m^2) as calculated from the following:    Height as of 1/27/17: 1.753 m (5' 9\").    Weight as of this encounter: 112.7 kg (248 lb 8 oz).    Gen: Resting comfortably in bed, NAD   HEENT:AT/ NC, PERRL b/l, EOM grossly intact, MMM   BACK: no CVA tenderness, no midline bony tenderness  PULM/THORAX: Clear to auscultation bilaterally, no rales/rhonchi/wheezes  CV:RRR, S1 and S2 appreciated, no extra heart sounds, murmurs or rub auscultated.  ABD: soft, nontender, nondistended. Normoactive bowel sounds.   EXT: Warm, well perfused. No LE edema noted. TR band in place. No hematoma.   NEURO: AOx4. Speech fluent and articulate. No focal deficits appreciated.           Data:     Results for orders placed or performed during the hospital encounter of 11/14/17 (from the past 24 hour(s))   Dermatology IP Consult: Patient to be seen: Routine - within 24 hours; leg rash and lesions, family requesting; Consultant may enter orders: Yes    Narrative    Marcell Mae, " "MD     11/16/2017  8:29 AM  [facility] Inpatient Consult Dermatology Note    Impression/Plan:  1. Chronic stasis dermatitis with acute flare on right lower leg:    Does not appear consistent with cellulitis clinically or by   history.  Recommended triamcinolone 0.1% ointment applied BID   when itch or rash present on lower legs (ask pharmacy to supply   80g tube to have at patient's bedside). Can also be occluded   overnight with saran wrap or other occlusive dressing to increase   potency.  Please rx at discharge for patient.  Ultimately,   compression stocking use daily is the most successful treatment   to prevent flares of dermatitis by treating swelling. Avoid use   of any over the counter antibiotic ointments on the skin as   venous stasis dermatitis is prone to developing contact allergic   reactions.    2.  Crusted papules with peripheral scale on the left thigh:    Suspect these are resolving excoriated folliculitis; suspect   patient is chronically colonized with staph.  To prevent new   lesions from forming, instruct patient at discharge to purchase   over the counter benzoyl peroxide 5% wash and use this daily in   the shower to decrease bacteria counts from the skin.  An   alternative method is to soak in a tub with 1/2 cup   nonconcentrated Clorox bleach diluted in full tub for 10-15   minutes 2-3 times weekly.    Patient seen and examined with Dr. Mae.    Thank you for the dermatology consultation. We will sign off.    Please do not hesitate to contact the dermatology   resident/faculty on call for any additional questions or   concerns.     Keyla Peñaloza MD  PGY-4, Dermatology  [phone]    Dermatology Problem List:  1. Stasis dermatitis  2. Recurrent abscesses/folliculitis    Date of Admission: Nov 14, 2017   Encounter Date: 11/15/2017     Reason for Consultation:   \"Leg rash and lesions, patient's family requested.\"    History of Present Illness:  Mr. Liborio Medellin is a " "57 year old male with history of   hypertension who was admitted 11/14 for STEMI requiring JESSICA to   the LAD. Dermatology was consulted for \"leg rash and lesions.\"     Liborio notes that for more than one year he has had an itchy rash   on his lower legs bilaterally.  This seems to come and go.  At   times, it is so itchy that he rips the skin scratching.  Right   now, his right leg is more affected, but his left leg has been   affected in the past.  He thinks he has seen bugs worming   underneath his skin behind his knee and at his wrist in the past.        Liborio notes that he gets severe swelling of his legs with   activity.  He has tried compression stockings once before but   felt they caused skin break down.      Liborio notes that he develops pimple like bumps on his legs that   he picks at because they itch.  They enlarge in size then crust   over and resolve.  He does not use any antibacterial washes at   present.  He does not have a bath tub at home.    No other skin concerns.    Past Medical History:   Patient Active Problem List   Diagnosis     Cellulitis due to MRSA     Advanced directives, counseling/discussion     Tobacco use disorder     Uncontrolled hypertension     STEMI involving left anterior descending coronary artery (H)     History reviewed. No pertinent past medical history.  No past surgical history on file.      Social History:  Not assessed.    Family History:  Not assessed.    Medications:  Current Facility-Administered Medications   Medication     [START ON 11/16/2017] lisinopril (PRINIVIL/ZESTRIL) tablet 20   mg     carvedilol (COREG) tablet 12.5 mg     fluticasone (VERAMYST) spray 2 spray     lidocaine 1 % 1 mL     lidocaine (LMX4) kit     sodium chloride (PF) 0.9% PF flush 3 mL     sodium chloride (PF) 0.9% PF flush 3 mL     HOLD: Metformin and metformin containing medications on day of   procedure and 48 hours after IV contrast given     nitroGLYcerin (NITROSTAT) sublingual tablet 0.4 mg "     naloxone (NARCAN) injection 0.1-0.4 mg     hydrALAZINE (APRESOLINE) injection 10 mg     aspirin EC EC tablet 81 mg     atorvastatin (LIPITOR) tablet 40 mg     acetaminophen (TYLENOL) tablet 325-650 mg     HYDROcodone-acetaminophen (NORCO) 5-325 MG per tablet 1-2   tablet     ondansetron (ZOFRAN-ODT) ODT tab 4 mg    Or     ondansetron (ZOFRAN) injection 4 mg     medication instruction - medications to avoid while on   abciximab (REOPRO) therapy     No IM or Subcutaneous injections during abciximab (REOPRO)   infusion [except Subcutaneous insulin and enoxaparin (LOVENOX)]     ticagrelor (BRILINTA) tablet 90 mg     nitroGLYcerin 50 mg in D5W 250 mL (adult std) infusion          No Known Allergies      Review of Systems:  -Constitutional:  Feeling well, in usual state of health.  -Skin:  As per HPI, no additional concerns.  10 point ROS otherwise negative    Physical exam:  Vitals: /70  Temp 97.9  F (36.6  C)  Resp 11  Wt 111.6   kg (246 lb 0.5 oz)  SpO2 98%  BMI 36.33 kg/m2  GEN: This is a well developed, well-nourished male in no acute   distress, in a pleasant mood.  He appears comfortable and does   not appear acutely ill.  SKIN: Full skin excluding included the head/face, neck, both   arms, chest, back, abdomen, both legs, digits and/or nails,   buttocks, and groin was completed.  -Crusted circular pink papules with surrounding peripheral scale   x2 on the left anterior thigh.  -Pink erythema extending from the proximal right lower legs to   the ankle.  The anterior is slightly more pink than the   posterior.  There is no associated warmth.  -Numerous excoriations on the lower legs bilaterally.  -1-2+ pitting edema bilaterally.  -No other lesions of concern on areas examined.     Laboratory:  Results for orders placed or performed during the hospital   encounter of 11/14/17 (from the past 24 hour(s))   Activated clotting time POCT   Result Value Ref Range    Activated Clot Time 253 (H) 105 - 167 sec    Activated clotting time POCT   Result Value Ref Range    Activated Clot Time 249 (H) 105 - 167 sec   ISTAT gases elec ica gluc art POCT   Result Value Ref Range    pH Arterial 7.34 (L) 7.35 - 7.45 pH    pCO2 Arterial 43 35 - 45 mm Hg    pO2 Arterial 131 (H) 80 - 105 mm Hg    Bicarbonate Arterial 23 21 - 28 mmol/L    O2 Sat Arterial 99 92 - 100 %    Sodium 137 133 - 144 mmol/L    Potassium 4.3 3.4 - 5.3 mmol/L    Glucose 144 (H) 70 - 99 mg/dL    Calcium Ionized 4.7 4.4 - 5.2 mg/dL    Hemoglobin 13.3 13.3 - 17.7 g/dL    Hematocrit - POCT 39 (L) 40.0 - 53.0 %PCV   Coronary Angiography Adult Order    Narrative    The results/report for this Cardiology exam is scanned in to   [employer] and can   be found under the Media Tab in the Chart Review activity.         EKG 12-lead, tracing only    Result Value Ref Range    Interpretation ECG Click View Image link to view waveform and   result    Troponin I   Result Value Ref Range    Troponin I ES 2.000 (HH) 0.000 - 0.045 ug/L   Magnesium (1200)   Result Value Ref Range    Magnesium 2.3 1.6 - 2.3 mg/dL   CBC with platelets   Result Value Ref Range    WBC 14.7 (H) 4.0 - 11.0 10e9/L    RBC Count 4.97 4.4 - 5.9 10e12/L    Hemoglobin 14.4 13.3 - 17.7 g/dL    Hematocrit 44.7 40.0 - 53.0 %    MCV 90 78 - 100 fl    MCH 29.0 26.5 - 33.0 pg    MCHC 32.2 31.5 - 36.5 g/dL    RDW 13.8 10.0 - 15.0 %    Platelet Count 243 150 - 450 10e9/L   Basic metabolic panel   Result Value Ref Range    Sodium 139 133 - 144 mmol/L    Potassium 4.0 3.4 - 5.3 mmol/L    Chloride 106 94 - 109 mmol/L    Carbon Dioxide 26 20 - 32 mmol/L    Anion Gap 7 3 - 14 mmol/L    Glucose 132 (H) 70 - 99 mg/dL    Urea Nitrogen 21 7 - 30 mg/dL    Creatinine 1.19 0.66 - 1.25 mg/dL    GFR Estimate 63 >60 mL/min/1.7m2    GFR Estimate If Black 76 >60 mL/min/1.7m2    Calcium 8.4 (L) 8.5 - 10.1 mg/dL   EKG 12-lead, tracing only - STAT   Result Value Ref Range    Interpretation ECG Click View Image link to view waveform and   result     Troponin I   Result Value Ref Range    Troponin I ES 10.007 (HH) 0.000 - 0.045 ug/L   Partial thromboplastin time   Result Value Ref Range    PTT 24 22 - 37 sec   Lipid panel   Result Value Ref Range    Cholesterol 140 <200 mg/dL    Triglycerides 65 <150 mg/dL    HDL Cholesterol 50 >39 mg/dL    LDL Cholesterol Calculated 77 <100 mg/dL    Non HDL Cholesterol 90 <130 mg/dL   INR (Reopro)   Result Value Ref Range    INR 1.05 0.86 - 1.14   CBC with platelets (Reopro)   Result Value Ref Range    WBC 9.9 4.0 - 11.0 10e9/L    RBC Count 4.57 4.4 - 5.9 10e12/L    Hemoglobin 13.1 (L) 13.3 - 17.7 g/dL    Hematocrit 40.8 40.0 - 53.0 %    MCV 89 78 - 100 fl    MCH 28.7 26.5 - 33.0 pg    MCHC 32.1 31.5 - 36.5 g/dL    RDW 14.1 10.0 - 15.0 %    Platelet Count 293 150 - 450 10e9/L   Basic metabolic panel   Result Value Ref Range    Sodium 139 133 - 144 mmol/L    Potassium 3.9 3.4 - 5.3 mmol/L    Chloride 106 94 - 109 mmol/L    Carbon Dioxide 25 20 - 32 mmol/L    Anion Gap 7 3 - 14 mmol/L    Glucose 134 (H) 70 - 99 mg/dL    Urea Nitrogen 20 7 - 30 mg/dL    Creatinine 1.08 0.66 - 1.25 mg/dL    GFR Estimate 70 >60 mL/min/1.7m2    GFR Estimate If Black 85 >60 mL/min/1.7m2    Calcium 8.3 (L) 8.5 - 10.1 mg/dL   EKG 12-lead, tracing only    Result Value Ref Range    Interpretation ECG Click View Image link to view waveform and   result        Dr. Mae staffed the patient.    Staff Involved:  Resident(Keyla Peñaloza)/Staff(as above)           INR   Result Value Ref Range    INR 1.12 0.86 - 1.14   Basic metabolic panel (AM Draw)   Result Value Ref Range    Sodium 136 133 - 144 mmol/L    Potassium 3.9 3.4 - 5.3 mmol/L    Chloride 106 94 - 109 mmol/L    Carbon Dioxide 22 20 - 32 mmol/L    Anion Gap 8 3 - 14 mmol/L    Glucose 110 (H) 70 - 99 mg/dL    Urea Nitrogen 20 7 - 30 mg/dL    Creatinine 0.96 0.66 - 1.25 mg/dL    GFR Estimate 81 >60 mL/min/1.7m2    GFR Estimate If Black >90 >60 mL/min/1.7m2    Calcium 8.6 8.5 - 10.1 mg/dL   CBC (AM  Draw)   Result Value Ref Range    WBC 8.4 4.0 - 11.0 10e9/L    RBC Count 4.43 4.4 - 5.9 10e12/L    Hemoglobin 12.8 (L) 13.3 - 17.7 g/dL    Hematocrit 39.9 (L) 40.0 - 53.0 %    MCV 90 78 - 100 fl    MCH 28.9 26.5 - 33.0 pg    MCHC 32.1 31.5 - 36.5 g/dL    RDW 14.4 10.0 - 15.0 %    Platelet Count 265 150 - 450 10e9/L   Troponin I   Result Value Ref Range    Troponin I ES 12.962 (HH) 0.000 - 0.045 ug/L   EKG 12-lead, tracing only   Result Value Ref Range    Interpretation ECG Click View Image link to view waveform and result         All cardiac studies reviewed  All imaging studies reviewed by me.                  ATTENDING NOTE:  Patient has been seen and evaluated by me on 11/16/2017.  I have reviewed today's vital signs, medications, labs, and imaging results.  I have reviewed and edited, as necessary, the history, review of systems, physical examination, and assessment and plan.  I have discussed my assessment and plan with the cardiology fellow.  Liborio Medellin is a 57 year old male with risk factor profile (+) HTN, (-) DM, (-) hypercholesterolemia, (+) 1 pack/week x 30 years, (+) fam Hx premature CAD, presented to [facility] clinic 2 days ago with crescendo chest pain over a week.  He was seen by a nurse practitioner and was noted to have dynamic ST elevation on the ECG.  The recorded ECG did not show ST elevation.  EMS was called and the patient was emergently transferred to [facility] for further management.  The patient underwent coronary angiography and was found to have a right dominant coronary system with single vessel CAD.  There was a 90% ruptured plaque in the proximal LAD with embolization of thrombus into the apical LAD.  The proximal LAD thrombus was aspirated and the residual stenosis was stented with a 4.0x16 mm Synergy.  Symptom onset to hospital arrival was 2 hours.  Door arrival to PCI 20 minutes with RT radial approach (two attempts).  In spite of aspiration, [place], and low  inflation POBA, I was not able to re-establish flow into the apical LAD.  The patient had residual chest discomfort at the completion of the procedure and there was 1 mm ST elevation on the telemetry leads.   Troponin anjali from 2.0 to 10.0.  Follow up troponin.  Exam documented above. Rt radial arteriotomy healing well.       Lab Results   Component Value Date    TROPI 12.962 () 11/16/2017    TROPI 10.007 () 11/15/2017    TROPI 2.000 () 11/14/2017     Troponin elevation less than what I anticipated.  ECG shows persistent ST elevation in V4-V5 of 1 mm consistent with apical infarction / injury. QTC 0.52 today without clear etiology.  Awaiting cardiac MRI to assess for apical viability.  Patient on ASA, Ticagrelor, BB, ACE-I, and statin.  Potential discharge later this evening or tomorrow.          Scotty Dunaway MD     Cardiovascular Division

## 2017-11-16 NOTE — PLAN OF CARE
Problem: Patient Care Overview  Goal: Plan of Care/Patient Progress Review  Outcome: Improving    Patient a/o x 4. VSSA. SR with occasional junctional beats followed by bundle changes. Gave 1 tab norco for generalized pain + 3 mg melatonin for sleep. Denied chest pain. Slept in between cares. Pleasant and cooperative. NPO. Plan on cardiac MRI @ 1100.

## 2017-11-16 NOTE — PLAN OF CARE
Problem: Patient Care Overview  Goal: Plan of Care/Patient Progress Review  Outcome: No Change  Pt s/p STEMI s/p PCI w/ JESSICA to LAD 11/14. Pt A/O. VSS. RA. NSR/SB; pt noted to have intermittent junctional rhythm + long QT, also noted to have HR fluctuate in high 40s-50s intermittently (CSI notified/aware); EP consult placed. Pt denied dizziness/lightheadedness when asked by writer. Denies pain. Trops elevated (12.962). Resting comfortably in room, makes needs known. Continue with plan of care and report changes to CSI team. Plan for pt to be NPO @ 0000 for cardiac MRI tomorrow (11/17).

## 2017-11-17 ENCOUNTER — APPOINTMENT (OUTPATIENT)
Dept: MRI IMAGING | Facility: CLINIC | Age: 57
End: 2017-11-17
Attending: INTERNAL MEDICINE
Payer: MEDICAID

## 2017-11-17 VITALS
DIASTOLIC BLOOD PRESSURE: 61 MMHG | RESPIRATION RATE: 18 BRPM | OXYGEN SATURATION: 97 % | BODY MASS INDEX: 36.61 KG/M2 | TEMPERATURE: 98.4 F | SYSTOLIC BLOOD PRESSURE: 115 MMHG | WEIGHT: 247.9 LBS

## 2017-11-17 LAB
ANION GAP SERPL CALCULATED.3IONS-SCNC: 8 MMOL/L (ref 3–14)
BUN SERPL-MCNC: 22 MG/DL (ref 7–30)
CALCIUM SERPL-MCNC: 8.8 MG/DL (ref 8.5–10.1)
CHLORIDE SERPL-SCNC: 107 MMOL/L (ref 94–109)
CO2 SERPL-SCNC: 25 MMOL/L (ref 20–32)
CREAT SERPL-MCNC: 1.01 MG/DL (ref 0.66–1.25)
ERYTHROCYTE [DISTWIDTH] IN BLOOD BY AUTOMATED COUNT: 14.4 % (ref 10–15)
GFR SERPL CREATININE-BSD FRML MDRD: 76 ML/MIN/1.7M2
GLUCOSE SERPL-MCNC: 113 MG/DL (ref 70–99)
HCT VFR BLD AUTO: 39.4 % (ref 40–53)
HGB BLD-MCNC: 12.9 G/DL (ref 13.3–17.7)
MCH RBC QN AUTO: 29.8 PG (ref 26.5–33)
MCHC RBC AUTO-ENTMCNC: 32.7 G/DL (ref 31.5–36.5)
MCV RBC AUTO: 91 FL (ref 78–100)
PLATELET # BLD AUTO: 261 10E9/L (ref 150–450)
POTASSIUM SERPL-SCNC: 4.1 MMOL/L (ref 3.4–5.3)
RBC # BLD AUTO: 4.33 10E12/L (ref 4.4–5.9)
SODIUM SERPL-SCNC: 140 MMOL/L (ref 133–144)
WBC # BLD AUTO: 7.4 10E9/L (ref 4–11)

## 2017-11-17 PROCEDURE — 93010 ELECTROCARDIOGRAM REPORT: CPT | Performed by: INTERNAL MEDICINE

## 2017-11-17 PROCEDURE — 80048 BASIC METABOLIC PNL TOTAL CA: CPT | Performed by: INTERNAL MEDICINE

## 2017-11-17 PROCEDURE — 25000128 H RX IP 250 OP 636

## 2017-11-17 PROCEDURE — 25000128 H RX IP 250 OP 636: Performed by: INTERNAL MEDICINE

## 2017-11-17 PROCEDURE — 85027 COMPLETE CBC AUTOMATED: CPT | Performed by: INTERNAL MEDICINE

## 2017-11-17 PROCEDURE — 90732 PPSV23 VACC 2 YRS+ SUBQ/IM: CPT

## 2017-11-17 PROCEDURE — 75561 CARDIAC MRI FOR MORPH W/DYE: CPT

## 2017-11-17 PROCEDURE — 25000132 ZZH RX MED GY IP 250 OP 250 PS 637

## 2017-11-17 PROCEDURE — A9585 GADOBUTROL INJECTION: HCPCS | Performed by: INTERNAL MEDICINE

## 2017-11-17 PROCEDURE — 36415 COLL VENOUS BLD VENIPUNCTURE: CPT | Performed by: INTERNAL MEDICINE

## 2017-11-17 PROCEDURE — 93005 ELECTROCARDIOGRAM TRACING: CPT

## 2017-11-17 PROCEDURE — 25000132 ZZH RX MED GY IP 250 OP 250 PS 637: Performed by: INTERNAL MEDICINE

## 2017-11-17 PROCEDURE — 99238 HOSP IP/OBS DSCHRG MGMT 30/<: CPT | Mod: 25 | Performed by: INTERNAL MEDICINE

## 2017-11-17 PROCEDURE — 90686 IIV4 VACC NO PRSV 0.5 ML IM: CPT | Performed by: INTERNAL MEDICINE

## 2017-11-17 PROCEDURE — 75561 CARDIAC MRI FOR MORPH W/DYE: CPT | Mod: 26 | Performed by: INTERNAL MEDICINE

## 2017-11-17 RX ORDER — METOPROLOL SUCCINATE 25 MG/1
12.5 TABLET, EXTENDED RELEASE ORAL DAILY
Qty: 15 TABLET | Refills: 3 | Status: SHIPPED | OUTPATIENT
Start: 2017-11-17 | End: 2017-11-21

## 2017-11-17 RX ORDER — LISINOPRIL 30 MG/1
30 TABLET ORAL DAILY
Qty: 30 TABLET | Refills: 3 | Status: SHIPPED | OUTPATIENT
Start: 2017-11-18 | End: 2017-11-21

## 2017-11-17 RX ORDER — GADOBUTROL 604.72 MG/ML
7.5 INJECTION INTRAVENOUS ONCE
Status: COMPLETED | OUTPATIENT
Start: 2017-11-17 | End: 2017-11-17

## 2017-11-17 RX ORDER — ATORVASTATIN CALCIUM 40 MG/1
40 TABLET, FILM COATED ORAL DAILY
Qty: 30 TABLET | Refills: 3 | Status: SHIPPED | OUTPATIENT
Start: 2017-11-17 | End: 2017-11-21

## 2017-11-17 RX ADMIN — TRIAMCINOLONE ACETONIDE: 1 CREAM TOPICAL at 11:22

## 2017-11-17 RX ADMIN — GADOBUTROL 7.5 ML: 604.72 INJECTION INTRAVENOUS at 10:03

## 2017-11-17 RX ADMIN — LISINOPRIL 30 MG: 20 TABLET ORAL at 08:33

## 2017-11-17 RX ADMIN — TICAGRELOR 90 MG: 90 TABLET ORAL at 08:33

## 2017-11-17 RX ADMIN — INFLUENZA A VIRUS A/MICHIGAN/45/2015 X-275 (H1N1) ANTIGEN (FORMALDEHYDE INACTIVATED), INFLUENZA A VIRUS A/HONG KONG/4801/2014 X-263B (H3N2) ANTIGEN (FORMALDEHYDE INACTIVATED), INFLUENZA B VIRUS B/PHUKET/3073/2013 ANTIGEN (FORMALDEHYDE INACTIVATED), AND INFLUENZA B VIRUS B/BRISBANE/60/2008 ANTIGEN (FORMALDEHYDE INACTIVATED) 0.5 ML: 15; 15; 15; 15 INJECTION, SUSPENSION INTRAMUSCULAR at 16:02

## 2017-11-17 RX ADMIN — TRIAMCINOLONE ACETONIDE: 1 CREAM TOPICAL at 14:49

## 2017-11-17 RX ADMIN — ASPIRIN 81 MG: 81 TABLET, COATED ORAL at 08:33

## 2017-11-17 RX ADMIN — PNEUMOCOCCAL VACCINE POLYVALENT 0.5 ML
25; 25; 25; 25; 25; 25; 25; 25; 25; 25; 25; 25; 25; 25; 25; 25; 25; 25; 25; 25; 25; 25; 25 INJECTION, SOLUTION INTRAMUSCULAR; SUBCUTANEOUS at 16:20

## 2017-11-17 NOTE — PROGRESS NOTES
Care Coordinator Progress Note     Admission Date/Time:  11/14/2017  Attending MD:  Scotty Dunaway MD     Data  Chart reviewed, discussed with interdisciplinary team.   Patient was admitted for:    ST elevation myocardial infarction involving left anterior descending (LAD) coronary artery (H)  ST elevation myocardial infarction (STEMI), unspecified artery (H).    Concerns with insurance coverage for discharge needs: None.  Current Living Situation: Patient lives alone. Pt states he is independent with his cares.  Support System: Supportive and Involved   Services Involved: None currently   Transportation: Family or Friend will provide  Barriers to Discharge: Medical plan of care    Coordination of Care and Referrals: No home care needs identified. Per pt and chart review, pt completed [employer] application with financial counseling this admission. Pt requested to speak with them prior to admission, this writer called and spoke with Ammy who stated she will see him this afternoon. Pt states he will call [facility] to schedule PCP follow up, declined assistance.    Plan  Anticipated Discharge Date: 11/17/2017.  Anticipated Discharge Plan: Discharge to home  CC will continue to monitor patient's medical condition and progress towards discharge.  Kallie Pardo RN BSN  6C Unit Care Coordinator  Phone number: [phone]  Pager: [phone]

## 2017-11-17 NOTE — PROGRESS NOTES
DISCHARGE                         11/17/2017  4:52 PM  ----------------------------------------------------------------------------  Discharged to: Home  Via: Automobile  Accompanied by: Self (being picked up by pt brother)  Discharge Instructions: diet, activity, medications, follow up appointments, when to call the MD, aftercare instructions, and what to watchout for (i.e. s/s of infection, pain, numbness/tingling)  Prescriptions: To be filled by [facility] discharge pharmacy; medication list reviewed & sent with pt  Follow Up Appointments: information given  Belongings: All sent with pt  IVs: out  Telemetry: off  Pt exhibits understanding of above discharge instructions; all questions answered.    Medication list reviewed with pt; writer reiterated the importance of compliance with all medications, especially the Aspirin + Brilanta. Pt exhibited signs of understanding of all new medications.    Writer explained to pt to seek medical attention if radial access site becomes numb/tingling, increasing pain, or exhibits s/s of infection (red, swollen, itchy, etc). Pt exhibited signs of understanding.     Writer explained that pt must follow-up with a primary care provider within one week, as described in d/c paperwork.     Writer explained to pt that cardiology should be contacting him regarding a follow-up appt (per what CSI had mentioned); writer explained that if he has not heard anything within one week that he should contact the cardiology clinic. Hospital  number was provided.     Discharge Paperwork: Signed, copied, and sent home with patient.

## 2017-11-17 NOTE — DISCHARGE SUMMARY
[facility] Discharge Summary    Liborio Medellin MRN# [id number]   Age: 57 year old YOB: 1960     Date of Admission:  11/14/2017  Date of Discharge::  11/17/2017  Admitting Physician:  Scotty Dunaway MD  Discharge Physician:  Elysia Gonzalez MD           Discharge Diagnosis:   STEMI          Procedures:     PROCEDURES PERFORMED:   Coronary Angiography  Percutaneous Coronary Intervention     PHYSICIANS:  Attending Physician: Scotty Dunaway MD  Interventional Cardiology Fellow: Edgar Pearl MD  Cardiology Fellow: None     INDICATION:  Liborio Medellin is a 57 year old male with risk factor profile (+) HTN, (-) DM, (+) hypercholesterolemia, (+) ~50 pack-year tobacco use, (?) fam Hx premature CAD, who presents on an emergent basis. The clinical presentation was STEMI (CCS IV).  He received  mg en route.  He had been having intermittent chest pain over the past week.     Timing of events:  1544 Dispatch  1546 En Route  1552 @ [facility] ([facility])  1607 Transport  1640 Arrival  1711 Device     DESCRIPTION:  1. Consent obtained with discussion of risks.  All questions were answered.  2. Sterile prep and procedure.  3. Location with Sheaths:   RT Radial Arterial  6 Fr  10 cm [short]  4. Access: Local anesthetic with lidocaine.  A micropuncture 21 guage needle with ultrasound guidance was used to establish vascular access using a modified Seldinger technique.  5. Diagnostic Catheters:   None  6. Guiding Catheters:  6 Fr  Ikari RT 1.0  6. Estimated blood loss: < 5 ml     MEDICATIONS:  The procedure was performed under conscious sedation for 73 minutes from 453 to 606.  The patient was assessed immediately before the first sedation medication was administered.  Midazolam 2 mg and Fentanyl 100 mcg were administered.  Heart rate, BP, respiration, oxygen saturation and patient responses were monitored throughout the procedure with the assistance of the RN under my  supervision.  >> IA Verapamil and IA NTG were administered to prophylax against radial artery spasm.  >> Antiplatelet Therapy:  mg     Procedures:     CORONARY ANGIOGRAM:   1. Both coronary arteries arise from their respective cusps.  2. Dominance: Right  3. The LM is without angiographic evidence of disease.   4. LAD: Type 3 [LAD supplies the entire apex]. The LAD gives rise to septal perforators, small D1,and medium sized D2/3.  The pLAD has a 95% acute thrombotic lesion (culprit lesion), mLAD has a 25% stenosis, dLAD has a 30% stenosis.  D2 has a 30% proximal area of stenosis. There were signs of distal embolization of the acute thrombus, the apical LAD was 100% occluded in the very distal segment.  5. LCX gives rise to a large OM1 and medium sized OM2.  There is a small OM3.  The LCx and associated branches are without evidence of disease.  6. RCA gives rise to PL branches and supplies PDA. The pRCA has a 0% stenosis, mRCA has a 0% stenosis, dRCA has a 0% stenosis, RPDA has a 0% stenosis and RPLA has a 0% stenosis.       PERCUTANEOUS CORONARY INTERVENTION:   Lesion #1:  LAD: proximal  A 6 Fr Ikari Left 3.5 was positioned at the ostium of the LMCA.  IV UFH was administered to achieve anticoagulation.     A PT2 wire was advanced across the proximal LAD lesion and positioned in the distal vessel. Aspiration thrombectomy was performed via a 6 Fr Quick Cat Extraction Catheter was used to extract 40 cc with two passes at the lesion.  A 2.5*12mm Emerge balloon was used to pre-dilate the lesion. Following that, a 4.0*16mm Synergy drug eluting stent was successfully deployed across the proximal LAD.  The stent was post-dilated with a 4.5*8 mm non-compliant balloon.  Final angiography showed no evidence of perforation or dissection with residual stenosis of 0% and MONSERRAT 3 flow.  No complications.     To address the apical acute occlusion, the PT2 wire was passed to the apical LAD. A 2.0*8 mm Emerge balloon was  passed to the apical LAD (Dottering) with 1 low pressure inflation to 4 tamar.  While a wire could be passed distally, we were not able to reestablish flow in the apical LAD.  The patient was placed on a G2B3A inhibitor (reopro).      VF Arrest:    Following access the patient had an episode of VF arrest for which a single shock was delivered.  Afterwards he was with hemodynamically stable throughout the rest of the case.     Sheath Removal:  The right radial artery sheath was manually removed in the cardiac catheterization laboratory.     Contrast: Isovue, 270 ml      Fluoroscopy Time: 24.1 min     COMPLICATIONS:  1. None     SUMMARY:   1. Acute anterior STEMI  2. Single vessel CAD (ostial LAD)  3. Successful deployment of a JESSICA to the proximal LAD  4. Embolization of thrombus to the apical LAD (pre-intervention).      PLAN:   >> ASA 81 mg qd and Ticagrelor 90 mg BID   >> Reopro x 12 hours  >> Bedrest per protocol  >> Continued medical management and lifestyle modification for cardiovascular risk factor optimization.   >> Admit to inpatient 4E  >> Cardiac MRI tomorrow     The attending interventional cardiologist was present and supervised all critical aspects the procedure.     Findings discussed with patient/admitting team.     See CVIS report for final draft.     Edgar Pearl MD  Interventional Cardiology Fellow             Discharge Medications:      Liborio Medlelin   Home Medication Instructions ANTHONY:[id number]    Printed on:11/17/17 1311   Medication Information                      aspirin EC 81 MG EC tablet  Take 1 tablet (81 mg) by mouth daily             atorvastatin (LIPITOR) 40 MG tablet  Take 1 tablet (40 mg) by mouth daily             lisinopril (PRINIVIL,ZESTRIL) 30 MG tablet  Take 1 tablet (30 mg) by mouth daily             metoprolol (TOPROL-XL) 25 MG 24 hr tablet  Take 0.5 tablets (12.5 mg) by mouth daily             order for DME  Equipment being ordered: compression stockings above knee 1  "pair             ticagrelor (BRILINTA) 90 MG tablet  Take 1 tablet (90 mg) by mouth 2 times daily                       Brief History of Illness:     Mr. Liborio Medellin is a 58yo male patient with a PMH of HTN, tobacco use who presented to clinic with complaints of chest pain x1 week, when in transfer to the ED had acute STEMI.      Per EMS, EKG prior to transfer was normal. While en route, patient became clammy, monitor showed diffuse ST elevation. He was urgently brought to [facility], immediately taken to cath lab and underwent PCI to LAD (thrombectomy, JESSICA).      Patient is seen post procedurally, states he feels \"much better than earlier.\" denies chest pain, shortness of breath, nausea. Remainder ROS negative.           Hospital Course:     Liborio Medellin is a 58yo male patient with PMH notable for HTN, tobacco use who presented to [facility] with STEMI s/p PCI with JESSICA to LAD.       #STEMI s/p JESSICA to pLAD  1x week of CP. RF: HTN, tobacco. During procedure had 1x episode of VF arrest which recovered with 1x shock.   - 180mg brillinta in cath lab - 90mg BID now. Aspirin 81 mg. For a year, patient aware of DAPT.   - metop 12.5 XL mg. Had some bradycardia but was not symptomatic. Dr. Dunaway felt comfortable sending patient home on low metop XL.   - atora 40 mg  - cardiac MRI today.  - lisinopril 30 mg today.         Discharge Instructions and Follow-Up:   Discharge diet: Regular   Discharge activity: Activity as tolerated   Discharge follow-up: Follow up with primary care provider in 7 days           Discharge Disposition:   Discharged to home      Elysia Gonzalez MD     Thank you for allowing me to care for this patient. This has been discussed with the attending physician.    Elysia Gonzalez MD  Cardiology Fellow, PGY-4        ATTENDING NOTE:  Patient has been seen and evaluated by me on 11/17/2017.  I have reviewed today's vital signs, medications, labs, and imaging results.  I have reviewed and edited, as necessary, the " history, review of systems, physical examination, and assessment and plan.  I have discussed my assessment and plan with the cardiology fellow.  Liborio Medellin is a 57 year old male with risk factor profile (+) HTN, (-) DM, (-) hypercholesterolemia, (+) 1 pack/week x 30 years, (+) fam Hx premature CAD, presented to [facility] 3 days ago with crescendo chest pain over a week.  He was seen by a nurse practitioner and was noted to have dynamic ST elevation on the ECG.  The recorded ECG did not show ST elevation.  EMS was called and the patient was emergently transferred to [facility] for further management.  The patient underwent coronary angiography and was found to have a right dominant coronary system with single vessel CAD.  There was a 90% ruptured plaque in the proximal LAD with embolization of thrombus into the apical LAD.  The proximal LAD thrombus was aspirated and the residual stenosis was stented with a 4.0x16 mm Synergy.  Symptom onset to hospital arrival was 2 hours.  Door arrival to PCI 20 minutes with RT radial approach (two attempts).  In spite of aspiration, Dottering, and low inflation POBA, I was not able to re-establish flow into the apical LAD.  The patient had residual chest discomfort at the completion of the procedure and there was 1 mm ST elevation on the telemetry leads.   Troponin anjali from 2.0 to peak 12.9.  Exam documented above. Rt radial arteriotomy healing well.  Troponin elevation less than what I anticipated.  ECG shows persistent ST elevation in V4-V5 of 1 mm consistent with apical infarction / injury. QTC 0.52 yesterday without clear etiology.  Recheck ECG this morning showed QTc 0.41.  Awaiting cardiac MRI to assess for apical viability.  Patient on ASA, Ticagrelor, BB, ACE-I, and statin.  He has asymptomatic sinus bradycardia.  Plan on discharge today.  Follow up with Dr. Dunaway in one month.        Scotty Dunaway MD     Cardiovascular Division

## 2017-11-17 NOTE — PLAN OF CARE
Problem: Patient Care Overview  Goal: Plan of Care/Patient Progress Review  Outcome: Improving    Patient a/o x 4. VSSA. SR/SB. No junctional rhythms tonight. Denied chest pain. Slept in between cares. Pleasant and cooperative. NPO. Plan on cardiac MRI. Possible DC today pending MRI results.

## 2017-11-17 NOTE — PROGRESS NOTES
"SPIRITUAL HEALTH SERVICES  SPIRITUAL ASSESSMENT Progress Note  [facility] ([place]) 6C     REFERRAL SOURCE: Initial unit  visit with pt, per request for hospital  visit as noted in initial nursing assessment.    Pt feeling his procedure went well, very grateful for outcome. Pt shared regarding haley: \"I know God was with me throughout all of this. It's by God's morgan that I got to the hospital safely, and there were some real angels there that helped me, especially the nurse.\" Prayer and blessing were shared.     PLAN: I will be gone now through next week, but on-call  always available at request.    Dewayne Grossman) Brendan Hunter M.Div., [facility]  Staff   Pager [phone]      "

## 2017-11-20 ENCOUNTER — TELEPHONE (OUTPATIENT)
Dept: FAMILY MEDICINE | Facility: CLINIC | Age: 57
End: 2017-11-20

## 2017-11-21 ENCOUNTER — OFFICE VISIT (OUTPATIENT)
Dept: FAMILY MEDICINE | Facility: CLINIC | Age: 57
End: 2017-11-21
Payer: MEDICAID

## 2017-11-21 VITALS
WEIGHT: 243 LBS | HEART RATE: 71 BPM | DIASTOLIC BLOOD PRESSURE: 68 MMHG | SYSTOLIC BLOOD PRESSURE: 123 MMHG | TEMPERATURE: 98.1 F | BODY MASS INDEX: 34.79 KG/M2 | HEIGHT: 70 IN

## 2017-11-21 DIAGNOSIS — R73.01 IMPAIRED FASTING GLUCOSE: ICD-10-CM

## 2017-11-21 DIAGNOSIS — I25.119 CORONARY ARTERY DISEASE INVOLVING NATIVE CORONARY ARTERY OF NATIVE HEART WITH ANGINA PECTORIS (H): Primary | ICD-10-CM

## 2017-11-21 DIAGNOSIS — I87.2 VENOUS STASIS DERMATITIS OF BOTH LOWER EXTREMITIES: ICD-10-CM

## 2017-11-21 DIAGNOSIS — I21.02 ST ELEVATION MYOCARDIAL INFARCTION INVOLVING LEFT ANTERIOR DESCENDING (LAD) CORONARY ARTERY (H): ICD-10-CM

## 2017-11-21 LAB
INTERPRETATION ECG - MUSE: NORMAL
INTERPRETATION ECG - MUSE: NORMAL

## 2017-11-21 PROCEDURE — 99214 OFFICE O/P EST MOD 30 MIN: CPT | Performed by: FAMILY MEDICINE

## 2017-11-21 RX ORDER — LISINOPRIL 30 MG/1
30 TABLET ORAL DAILY
Qty: 90 TABLET | Refills: 3 | Status: SHIPPED | OUTPATIENT
Start: 2017-11-21 | End: 2018-12-23

## 2017-11-21 RX ORDER — METOPROLOL SUCCINATE 25 MG/1
12.5 TABLET, EXTENDED RELEASE ORAL DAILY
Qty: 135 TABLET | Refills: 3 | Status: SHIPPED | OUTPATIENT
Start: 2017-11-21 | End: 2018-12-23

## 2017-11-21 RX ORDER — ATORVASTATIN CALCIUM 40 MG/1
40 TABLET, FILM COATED ORAL DAILY
Qty: 90 TABLET | Refills: 3 | Status: SHIPPED | OUTPATIENT
Start: 2017-11-21 | End: 2018-12-23

## 2017-11-21 RX ORDER — TRIAMCINOLONE ACETONIDE 1 MG/G
OINTMENT TOPICAL
Qty: 80 G | Refills: 0 | Status: SHIPPED | OUTPATIENT
Start: 2017-11-21 | End: 2023-02-07

## 2017-11-21 NOTE — NURSING NOTE
"Chief Complaint   Patient presents with     Hospital F/U       Initial /68 (BP Location: Left arm, Cuff Size: Adult Large)  Pulse 71  Temp 98.1  F (36.7  C) (Tympanic)  Ht 5' 9.75\" (1.772 m)  Wt 243 lb (110.2 kg)  BMI 35.12 kg/m2 Estimated body mass index is 35.12 kg/(m^2) as calculated from the following:    Height as of this encounter: 5' 9.75\" (1.772 m).    Weight as of this encounter: 243 lb (110.2 kg).  Medication Reconciliation: complete  "

## 2017-11-21 NOTE — PATIENT INSTRUCTIONS
Thank you for choosing [facility].  You may be receiving a survey in the mail from Aneudy Kapoor regarding your visit today.  Please take a few minutes to complete and return the survey to let us know how we are doing.      If you have questions or concerns, please contact us via [employer] or you can contact your care team at [phone].    Our Clinic hours are:  Monday 6:40 am  to 7:00 pm  Tuesday -Friday 6:40 am to 5:00 pm    The [place] outpatient lab hours are:  Monday - Friday 6:10 am to 4:45 pm  Saturdays 7:00 am to 11:00 am  Appointments are required, call [phone]    If you have clinical questions after hours or would like to schedule an appointment,  call the clinic at [phone].

## 2017-11-21 NOTE — PROGRESS NOTES
SUBJECTIVE:   Liborio Medellin is a 57 year old male who presents to clinic today for the following health issues:    Patient has ? About medication if 1 is keeping him up at night       Hospital Follow-up Visit:    Hospital/Nursing Home/IP Rehab Facility: [facility]  Date of Admission: 11-14-17  Date of Discharge: 11-17-17  Reason(s) for Admission: Myocardial infarction            Problems taking medications regularly:  None       Medication changes since discharge: ASA Atorvastatin Lisinopril Metoprolol ticagrelor        Problems adhering to non-medication therapy:  compression stocking     Summary of hospitalization:  [facility] discharge summary reviewed  Diagnostic Tests/Treatments reviewed.  Follow up needed: none  Other Healthcare Providers Involved in Patient s Care:         None  Update since discharge: improved.     Post Discharge Medication Reconciliation: discharge medications reconciled, continue medications without change.  Plan of care communicated with patient     Coding guidelines for this visit:  Type of Medical   Decision Making Face-to-Face Visit       within 7 Days of discharge Face-to-Face Visit        within 14 days of discharge   Moderate Complexity [id number]   High Complexity [id number]              Patient is a 57 yr old male here for a hospital follow up . He was admitted for an acute MI . He had a PCI done and the Left ascending artery was affected.He says he is still quite tired since he was discharged. Discussed at length what he will need to do to adjust , he is a heavy smoker and also drinks alcohol . I explained that he will have to give those up. Also ordered cholesterol panel for patient . Also did a screening for diabetes. Patient also was referred to cardiac rehab and follow up with cardiology was highly recommended.     Patient also suffers from venous stasis dermatitis and he was seen in the hospital for this. It was recommended that he use  some benzoyl peroxide wash and also triamcinolone ointment on both legs. He will also need to be wearing compression stockings from time to time.    Problem list and histories reviewed & adjusted, as indicated.  Additional history: as documented    Patient Active Problem List   Diagnosis     Cellulitis due to MRSA     Advanced directives, counseling/discussion     Tobacco use disorder     Uncontrolled hypertension     STEMI involving left anterior descending coronary artery (H)     No past surgical history on file.    Social History   Substance Use Topics     Smoking status: Former Smoker     Types: Cigarettes     Smokeless tobacco: Never Used      Comment: Less than 1 pp week.     Alcohol use Yes      Comment: Off and on-weekends.     Family History   Problem Relation Age of Onset     Coronary Artery Disease Father      bypass     GASTROINTESTINAL DISEASE Father      Other Cancer Maternal Grandmother      Coronary Artery Disease Paternal Grandmother          Current Outpatient Prescriptions   Medication Sig Dispense Refill     lisinopril (PRINIVIL,ZESTRIL) 30 MG tablet Take 1 tablet (30 mg) by mouth daily 90 tablet 3     atorvastatin (LIPITOR) 40 MG tablet Take 1 tablet (40 mg) by mouth daily 90 tablet 3     aspirin 81 MG EC tablet Take 1 tablet (81 mg) by mouth daily 90 tablet 3     metoprolol (TOPROL-XL) 25 MG 24 hr tablet Take 0.5 tablets (12.5 mg) by mouth daily 135 tablet 3     benzoyl peroxide (BENZOYL PEROXIDE WASH) 5 % LIQD Externally apply topically daily as needed 1 Bottle 1     triamcinolone (KENALOG) 0.1 % ointment Apply sparingly to affected area three times daily for 14 days. 80 g 0     ticagrelor (BRILINTA) 90 MG tablet Take 1 tablet (90 mg) by mouth 2 times daily 60 tablet 11     [DISCONTINUED] atorvastatin (LIPITOR) 40 MG tablet Take 1 tablet (40 mg) by mouth daily 30 tablet 3     [DISCONTINUED] lisinopril (PRINIVIL,ZESTRIL) 30 MG tablet Take 1 tablet (30 mg) by mouth daily 30 tablet 3      "[DISCONTINUED] metoprolol (TOPROL-XL) 25 MG 24 hr tablet Take 0.5 tablets (12.5 mg) by mouth daily 15 tablet 3     order for DME Equipment being ordered: compression stockings above knee 1 pair 1 Units 1     No Known Allergies      Reviewed and updated as needed this visit by clinical staffAllergies       Reviewed and updated as needed this visit by Provider         ROS:  Constitutional, HEENT, cardiovascular, pulmonary, gi and gu systems are negative, except as otherwise noted.      OBJECTIVE:   Temp 98.1  F (36.7  C) (Tympanic)  Ht 5' 9.75\" (1.772 m)  Wt 243 lb (110.2 kg)  BMI 35.12 kg/m2  Body mass index is 35.12 kg/(m^2).  GENERAL: healthy, alert and no distress  EYES: Eyes grossly normal to inspection, PERRL and conjunctivae and sclerae normal  HENT: ear canals and TM's normal, nose and mouth without ulcers or lesions  NECK: no adenopathy, no asymmetry, masses, or scars and thyroid normal to palpation  RESP: lungs clear to auscultation - no rales, rhonchi or wheezes  CV: regular rate and rhythm, normal S1 S2, no S3 or S4, no murmur, click or rub, no peripheral edema and peripheral pulses strong  ABDOMEN: soft, nontender, no hepatosplenomegaly, no masses and bowel sounds normal  MS: no gross musculoskeletal defects noted, no edema  SKIN: venous stasis dermatitis  PSYCH: mentation appears normal, affect normal/bright    Diagnostic Test Results:  none     ASSESSMENT/PLAN:   1. Coronary artery disease involving native coronary artery of native heart with angina pectoris (H)  Patient will return when fasting . Spent a long time explaining how patient needs to adjust lifestyle including smoking cessation , alcohol consumption to be reduced, low cholesterol diet , strict blood pressure control.   - Lipid panel reflex to direct LDL Fasting; Future    2. Impaired fasting glucose  Patient will return for labs   - **A1C FUTURE anytime; Future    3. ST elevation myocardial infarction involving left anterior descending " (LAD) coronary artery (H)  Medication refilled  - lisinopril (PRINIVIL,ZESTRIL) 30 MG tablet; Take 1 tablet (30 mg) by mouth daily  Dispense: 90 tablet; Refill: 3  - atorvastatin (LIPITOR) 40 MG tablet; Take 1 tablet (40 mg) by mouth daily  Dispense: 90 tablet; Refill: 3  - aspirin 81 MG EC tablet; Take 1 tablet (81 mg) by mouth daily  Dispense: 90 tablet; Refill: 3  - metoprolol (TOPROL-XL) 25 MG 24 hr tablet; Take 0.5 tablets (12.5 mg) by mouth daily  Dispense: 135 tablet; Refill: 3  - CARDIAC REHAB REFERRAL  - CARDIOLOGY EVAL ADULT REFERRAL    4. Venous stasis dermatitis of both lower extremities  Medication refilled  - benzoyl peroxide (BENZOYL PEROXIDE WASH) 5 % LIQD; Externally apply topically daily as needed  Dispense: 1 Bottle; Refill: 1  - triamcinolone (KENALOG) 0.1 % ointment; Apply sparingly to affected area three times daily for 14 days.  Dispense: 80 g; Refill: 0         Total of 25 minutes was spent in face to face contact with patient with > 50% in counseling and coordination of care. Options for treatment and/or follow-up care were reviewed with the patient. Liborio Medellin was engaged and actively involved in the decision making process. She verbalized understanding of the options discussed and was satisfied with the final plan.      FUTURE APPOINTMENTS:       - Follow-up visit as needed    Inge Vaughan MD  [facility]

## 2017-11-21 NOTE — MR AVS SNAPSHOT
After Visit Summary   11/21/2017    Liborio Medellin    MRN: [id number]           Patient Information     Date Of Birth          1960        Visit Information        Provider Department      11/21/2017 10:40 AM Inge Vaughan MD [facility]        Today's Diagnoses     Coronary artery disease involving native coronary artery of native heart with angina pectoris (H)    -  1    Impaired fasting glucose        ST elevation myocardial infarction involving left anterior descending (LAD) coronary artery (H)        Venous stasis dermatitis of both lower extremities          Care Instructions          Thank you for choosing [facility] (formerly [facility]).  You may be receiving a survey in the mail from [employer] regarding your visit today.  Please take a few minutes to complete and return the survey to let us know how we are doing.      If you have questions or concerns, please contact us via [employer] or you can contact your care team at [phone].    Our Clinic hours are:  Monday 6:40 am  to 7:00 pm  Tuesday -Friday 6:40 am to 5:00 pm    The [place] outpatient lab hours are:  Monday - Friday 6:10 am to 4:45 pm  Saturdays 7:00 am to 11:00 am  Appointments are required, call [phone]    If you have clinical questions after hours or would like to schedule an appointment,  call the clinic at [phone].          Follow-ups after your visit        Additional Services     CARDIAC REHAB REFERRAL           CARDIOLOGY EVAL ADULT REFERRAL       Your provider has referred you to:  [facility]: [facility] [phone]   [email or web address]    Please be aware that coverage of these services is subject to the terms and limitations of your health insurance plan.  Call member services at your health plan with any benefit or coverage questions.      Type of Referral:  Cardiology Follow Up    Timeframe requested:  Within 1  month    Please bring the following to your appointment:  >>   Any x-rays, CTs or MRIs which have been performed.  Contact the facility where they were done to arrange for  prior to your scheduled appointment.    >>   List of current medications  >>   This referral request   >>   Any documents/labs given to you for this referral                  Your next 10 appointments already scheduled     Nov 22, 2017 10:30 AM CST   LAB with [place] [facility]   [facility]    [address] [address]   [phone]           Please do not eat 10-12 hours before your appointment if you are coming in fasting for labs on lipids, cholesterol, or glucose (sugar). This does not apply to pregnant women. Water, hot tea and black coffee (with nothing added) are okay. Do not drink other fluids, diet soda or chew gum.              Future tests that were ordered for you today     Open Future Orders        Priority Expected Expires Ordered    **A1C FUTURE anytime Routine 11/21/2017 11/21/2018 11/21/2017    Lipid panel reflex to direct LDL Fasting Routine 11/21/2017 11/21/2018 11/21/2017            Who to contact     If you have questions or need follow up information about today's clinic visit or your schedule please contact [facility] directly at [phone].  Normal or non-critical lab and imaging results will be communicated to you by MyChart, letter or phone within 4 business days after the clinic has received the results. If you do not hear from us within 7 days, please contact the clinic through MyChart or phone. If you have a critical or abnormal lab result, we will notify you by phone as soon as possible.  Submit refill requests through [employer] or call your pharmacy and they will forward the refill request to us. Please allow 3 business days for your refill to be completed.          Additional Information About Your Visit        [employer] Information     [employer]  "gives you secure access to your electronic health record. If you see a primary care provider, you can also send messages to your care team and make appointments. If you have questions, please call your primary care clinic.  If you do not have a primary care provider, please call [phone] and they will assist you.        Care EveryWhere ID     This is your Care EveryWhere ID. This could be used by other organizations to access your [place] medical records  [id number]        Your Vitals Were     Pulse Temperature Height BMI (Body Mass Index)          71 98.1  F (36.7  C) (Tympanic) 5' 9.75\" (1.772 m) 35.12 kg/m2         Blood Pressure from Last 3 Encounters:   11/21/17 123/68   11/17/17 115/61   11/14/17 (!) 196/96    Weight from Last 3 Encounters:   11/21/17 243 lb (110.2 kg)   11/17/17 247 lb 14.4 oz (112.4 kg)   01/27/17 244 lb (110.7 kg)              We Performed the Following     CARDIAC REHAB REFERRAL     CARDIOLOGY EVAL ADULT REFERRAL          Today's Medication Changes          These changes are accurate as of: 11/21/17 12:53 PM.  If you have any questions, ask your nurse or doctor.               Start taking these medicines.        Dose/Directions    benzoyl peroxide 5 % Liqd   Commonly known as:  BENZOYL PEROXIDE WASH   Used for:  Venous stasis dermatitis of both lower extremities   Started by:  Inge Vaughan MD        Externally apply topically daily as needed   Quantity:  1 Bottle   Refills:  1       triamcinolone 0.1 % ointment   Commonly known as:  KENALOG   Used for:  Venous stasis dermatitis of both lower extremities   Started by:  Inge Vaughan MD        Apply sparingly to affected area three times daily for 14 days.   Quantity:  80 g   Refills:  0            Where to get your medicines      These medications were sent to [facility] [address] [place] [address]  [address] [address]     Phone:  [phone]     aspirin 81 MG EC " tablet    atorvastatin 40 MG tablet    benzoyl peroxide 5 % Liqd    lisinopril 30 MG tablet    metoprolol 25 MG 24 hr tablet    triamcinolone 0.1 % ointment                Primary Care Provider Office Phone # Fax #    Liborio Mercer [phone] [id number]  [facility] [address]        Equal Access to Services     [facility]: Hadii aad ku hadasho Soomaali, waaxda luqadaha, qaybta kaalmada adeegyada, waxay idiin hayaan adeeg kharash la'aan ah. So [facility] [phone].    ATENCIÓN: Si habla español, tiene a atkins disposición servicios gratuitos de asistencia lingüística. Llame al [phone].    We comply with applicable federal civil rights laws and [place] laws. We do not discriminate on the basis of race, color, national origin, age, disability, sex, sexual orientation, or gender identity.            Thank you!     Thank you for choosing [facility]  for your care. Our goal is always to provide you with excellent care. Hearing back from our patients is one way we can continue to improve our services. Please take a few minutes to complete the written survey that you may receive in the mail after your visit with us. Thank you!             Your Updated Medication List - Protect others around you: Learn how to safely use, store and throw away your medicines at www.disposemymeds.org.          This list is accurate as of: 11/21/17 12:53 PM.  Always use your most recent med list.                   Brand Name Dispense Instructions for use Diagnosis    aspirin 81 MG EC tablet     90 tablet    Take 1 tablet (81 mg) by mouth daily    ST elevation myocardial infarction involving left anterior descending (LAD) coronary artery (H)       atorvastatin 40 MG tablet    LIPITOR    90 tablet    Take 1 tablet (40 mg) by mouth daily    ST elevation myocardial infarction involving left anterior descending (LAD) coronary artery (H)       benzoyl peroxide 5 % Liqd    BENZOYL PEROXIDE WASH    1  Bottle    Externally apply topically daily as needed    Venous stasis dermatitis of both lower extremities       lisinopril 30 MG tablet    PRINIVIL,ZESTRIL    90 tablet    Take 1 tablet (30 mg) by mouth daily    ST elevation myocardial infarction involving left anterior descending (LAD) coronary artery (H)       metoprolol 25 MG 24 hr tablet    TOPROL-XL    135 tablet    Take 0.5 tablets (12.5 mg) by mouth daily    ST elevation myocardial infarction involving left anterior descending (LAD) coronary artery (H)       order for DME     1 Units    Equipment being ordered: compression stockings above knee 1 pair    Cellulitis due to MRSA       ticagrelor 90 MG tablet    BRILINTA    60 tablet    Take 1 tablet (90 mg) by mouth 2 times daily    ST elevation myocardial infarction involving left anterior descending (LAD) coronary artery (H)       triamcinolone 0.1 % ointment    KENALOG    80 g    Apply sparingly to affected area three times daily for 14 days.    Venous stasis dermatitis of both lower extremities

## 2017-11-21 NOTE — NURSING NOTE
"Chief Complaint   Patient presents with     Hospital F/U       Initial Temp 98.1  F (36.7  C) (Tympanic)  Ht 5' 9.75\" (1.772 m)  Wt 243 lb (110.2 kg)  BMI 35.12 kg/m2 Estimated body mass index is 35.12 kg/(m^2) as calculated from the following:    Height as of this encounter: 5' 9.75\" (1.772 m).    Weight as of this encounter: 243 lb (110.2 kg).  Medication Reconciliation: complete  "

## 2017-11-22 ENCOUNTER — PRE VISIT (OUTPATIENT)
Dept: CARDIOLOGY | Facility: CLINIC | Age: 57
End: 2017-11-22

## 2017-11-22 DIAGNOSIS — I25.119 CORONARY ARTERY DISEASE INVOLVING NATIVE CORONARY ARTERY OF NATIVE HEART WITH ANGINA PECTORIS (H): ICD-10-CM

## 2017-11-22 DIAGNOSIS — I21.02 STEMI INVOLVING LEFT ANTERIOR DESCENDING CORONARY ARTERY (H): Primary | ICD-10-CM

## 2017-11-22 DIAGNOSIS — R73.01 IMPAIRED FASTING GLUCOSE: ICD-10-CM

## 2017-11-22 LAB
CHOLEST SERPL-MCNC: 115 MG/DL
HBA1C MFR BLD: 5.6 % (ref 4.3–6)
HDLC SERPL-MCNC: 47 MG/DL
LDLC SERPL CALC-MCNC: 51 MG/DL
NONHDLC SERPL-MCNC: 68 MG/DL
TRIGL SERPL-MCNC: 84 MG/DL

## 2017-11-22 PROCEDURE — 83036 HEMOGLOBIN GLYCOSYLATED A1C: CPT | Performed by: FAMILY MEDICINE

## 2017-11-22 PROCEDURE — 36415 COLL VENOUS BLD VENIPUNCTURE: CPT | Performed by: FAMILY MEDICINE

## 2017-11-22 PROCEDURE — 80061 LIPID PANEL: CPT | Performed by: FAMILY MEDICINE

## 2017-11-22 NOTE — LETTER
[facility]  [address] [address]  Phone: [phone]  Fax: [phone]    November 27, 2017        Liborio Medellin     [facility] [address]          Dear Liborio,      Your recent lab results were normal.    Component      Latest Ref Rng & Units 11/22/2017   Cholesterol      <200 mg/dL 115   Triglycerides      <150 mg/dL 84   HDL Cholesterol      >39 mg/dL 47   LDL Cholesterol Calculated      <100 mg/dL 51   Non HDL Cholesterol      <130 mg/dL 68   Hemoglobin A1C      4.3 - 6.0 % 5.6         Sincerely,        Inge Vaughan MD / brandon cma

## 2017-11-22 NOTE — TELEPHONE ENCOUNTER
Previsit nursing summary    HPI: Liborio Medellin is a 57 year old male with risk factor profile (+) HTN, (-) DM, (+) hypercholesterolemia, (+) ~50 pack-year tobacco use, (?) fam Hx premature CAD, who presents on an emergent basis. The clinical presentation was STEMI (CCS IV).  He received  mg en route.  He had been having intermittent chest pain over the past week.    Procedures:    Cardiac MRI (11/17/2017)  1. The LV is normal in cavity size. The wall thickness is normal. The global systolic function is mildly  decreased. The LVEF is 49%. There is akinesis of the apical inferior and the true apical segments.     2. The RV is normal in cavity size. The global systolic function is normal. The RVEF is 55%.     3. Both atria are normal in size.     4. There is no significant valvular disease.     5. Late gadolinium enhancement imaging shows transmural hyperenhancement of the apical inferior segment  with microvascular obstruction, consistent with an acute MI involving the apical LAD territory.  Additionally, there are two separate areas of hyperenhancement - a transmural MI involving the mid lateral  segment and a small focus in the basal septal segment. Both these are suggestive of embolic MIs in the LAD  territory likely related to the recent PCI. There is approximately 90% viability involving the LAD  territory, and 100% viability in the RCA and LCx territories.     6. There is no intracardiac thrombus.     CONCLUSIONS: LVEF of 49% and RVEF of 55%, with a transmural MI involving the apical LAD territory and two  small embolic MIs, also in the LAD territory.    Coronary angiogram with PCI (11/14/2017)  1. Both coronary arteries arise from their respective cusps.  2. Dominance: Right  3. The LM is without angiographic evidence of disease.   4. LAD: Type 3 [LAD supplies the entire apex]. The LAD gives rise to septal perforators, small D1,and medium sized D2/3.  The pLAD has a 95% acute thrombotic lesion  (culprit lesion), mLAD has a 25% stenosis, dLAD has a 30% stenosis.  D2 has a 30% proximal area of stenosis. There were signs of distal embolization of the acute thrombus, the apical LAD was 100% occluded in the very distal segment.  5. LCX gives rise to a large OM1 and medium sized OM2.  There is a small OM3.  The LCx and associated branches are without evidence of disease.  6. RCA gives rise to PL branches and supplies PDA. The pRCA has a 0% stenosis, mRCA has a 0% stenosis, dRCA has a 0% stenosis, RPDA has a 0% stenosis and RPLA has a 0% stenosis.        PERCUTANEOUS CORONARY INTERVENTION:   Lesion #1:  LAD: proximal  A 6 Fr Ikari Left 3.5 was positioned at the ostium of the LMCA.  IV UFH was administered to achieve anticoagulation.      A PT2 wire was advanced across the proximal LAD lesion and positioned in the distal vessel. Aspiration thrombectomy was performed via a 6 Fr Quick Cat Extraction Catheter was used to extract 40 cc with two passes at the lesion.  A 2.5*12mm Emerge balloon was used to pre-dilate the lesion. Following that, a 4.0*16mm Synergy drug eluting stent was successfully deployed across the proximal LAD.  The stent was post-dilated with a 4.5*8 mm non-compliant balloon.  Final angiography showed no evidence of perforation or dissection with residual stenosis of 0% and MONSERRAT 3 flow.  No complications.      To address the apical acute occlusion, the PT2 wire was passed to the apical LAD. A 2.0*8 mm Emerge balloon was passed to the apical LAD ([place]) with 1 low pressure inflation to 4 tamar.  While a wire could be passed distally, we were not able to reestablish flow in the apical LAD.  The patient was placed on a G2B3A inhibitor (reopro).       VF Arrest:    Following access the patient had an episode of VF arrest for which a single shock was delivered.  Afterwards he was with hemodynamically stable throughout the rest of the case.

## 2017-11-25 ENCOUNTER — OFFICE VISIT (OUTPATIENT)
Dept: CARDIOLOGY | Facility: CLINIC | Age: 57
End: 2017-11-25
Attending: INTERNAL MEDICINE
Payer: MEDICAID

## 2017-11-25 VITALS
DIASTOLIC BLOOD PRESSURE: 81 MMHG | OXYGEN SATURATION: 99 % | WEIGHT: 244.4 LBS | SYSTOLIC BLOOD PRESSURE: 124 MMHG | HEART RATE: 65 BPM | HEIGHT: 71 IN | BODY MASS INDEX: 34.22 KG/M2

## 2017-11-25 DIAGNOSIS — G47.9 SLEEP DISORDER: ICD-10-CM

## 2017-11-25 DIAGNOSIS — I21.9: Primary | ICD-10-CM

## 2017-11-25 DIAGNOSIS — I21.02 STEMI INVOLVING LEFT ANTERIOR DESCENDING CORONARY ARTERY (H): ICD-10-CM

## 2017-11-25 LAB — INTERPRETATION ECG - MUSE: NORMAL

## 2017-11-25 PROCEDURE — 99213 OFFICE O/P EST LOW 20 MIN: CPT | Mod: 25,ZF

## 2017-11-25 PROCEDURE — 93005 ELECTROCARDIOGRAM TRACING: CPT | Mod: ZF

## 2017-11-25 PROCEDURE — 99214 OFFICE O/P EST MOD 30 MIN: CPT | Mod: ZP | Performed by: INTERNAL MEDICINE

## 2017-11-25 PROCEDURE — 93010 ELECTROCARDIOGRAM REPORT: CPT | Mod: ZP | Performed by: INTERNAL MEDICINE

## 2017-11-25 RX ORDER — ZOLPIDEM TARTRATE 10 MG/1
10 TABLET ORAL
Qty: 10 TABLET | Refills: 0 | Status: SHIPPED | OUTPATIENT
Start: 2017-11-25 | End: 2019-06-03

## 2017-11-25 ASSESSMENT — PAIN SCALES - GENERAL: PAINLEVEL: NO PAIN (0)

## 2017-11-25 NOTE — NURSING NOTE
Chief Complaint   Patient presents with     Follow Up For     manage CAD, S/P STEMI, JESSICA to PLAD/EKG     Vitals were taken and medications were reconciled. EKG was performed.  RAYMOND Kirby  9:20 AM

## 2017-11-25 NOTE — MR AVS SNAPSHOT
After Visit Summary   11/25/2017    Liborio Leung    MRN: [id number]           Patient Information     Date Of Birth          1960        Visit Information        Provider Department      11/25/2017 9:30 AM Scotty Dunaway MD [facility]        Today's Diagnoses     Myocardial infarction in recovery phase    -  1    STEMI involving left anterior descending coronary artery (H)        Sleep disorder          Care Instructions      It was a pleasure to see you in the cardiology clinic today.    If you have any questions, you can reach my nurse, Neetu Devine, at [phone].  Press Option #1 for the [facility], and then press Option #3 for nursing.    Note the new medications: None.    Stop the following medications: None    The results from today include:  ECG showed evolving changes from the recent heart attack (expected).    Studies ordered: Cardiac MRI (end of Feb 2018)    Referrals: Sleep Study      I would like you to follow up with Dr. Dunaway in one year.    Sincerely,      Scotty Dunaway MD               Follow-ups after your visit        Additional Services     SLEEP EVALUATION & MANAGEMENT REFERRAL - [facility]  [phone] (Age 2 and up)       Please be aware that coverage of these services is subject to the terms and limitations of your health insurance plan.  Call member services at your health plan with any benefit or coverage questions.      Please bring the following to your appointment:    >>   List of current medications   >>   This referral request   >>   Any documents/labs given to you for this referral                      Your next 10 appointments already scheduled     Feb 27, 2018  9:30 AM CST   MR MYOCARDIUM W CONTRAST with UUMR4, UU CV MR NURSE   [facility], [place], MRI ([facility] [place])    [address] [address]   [phone]            Take your medicines as usual, unless your doctor tells you not to. Bring a list of your current medicines to your exam (including vitamins, minerals and over-the-counter drugs).  You will be given intravenous contrast for this exam. To prepare:   The day before your exam, drink extra fluids at least six 8-ounce glasses (unless your doctor tells you to restrict your fluids).   Have a blood test (creatinine test) within 30 days of your exam. Go to your clinic or Diagnostic Imaging Department for this test.  The MRI machine uses a strong magnet. Please wear clothes without metal (snaps, zippers). A sweatsuit works well, or we may give you a hospital gown.  Please remove any body piercings and hair extensions before you arrive. You will also remove watches, jewelry, hairpins, wallets, dentures, partial dental plates and hearing aids. You may wear contact lenses, and you may be able to wear your rings. We have a safe place to keep your personal items, but it is safer to leave them at home.   **IMPORTANT** THE INSTRUCTIONS BELOW ARE ONLY FOR THOSE PATIENTS WHO HAVE BEEN TOLD THEY WILL RECEIVE SEDATION OR GENERAL ANESTHESIA DURING THEIR MRI PROCEDURE:  IF YOU WILL RECEIVE SEDATION (take medicine to help you relax during your exam):   You must get the medicine from your doctor before you arrive. Bring the medicine to the exam. Do not take it at home.   Arrive one hour early. Bring someone who can take you home after the test. Your medicine will make you sleepy. After the exam, you may not drive, take a bus or take a taxi by yourself.   No eating 8 hours before your exam. You may have clear liquids up until 4 hours before your exam. (Clear liquids include water, clear tea, black coffee and fruit juice without pulp.)  IF YOU WILL RECEIVE ANESTHESIA (be asleep for your exam):   Arrive 1 1/2 hours early. Bring someone who can take you home after the test. You may not drive, take a bus or take a taxi by yourself.   No  eating 8 hours before your exam. You may have clear liquids up until 4 hours before your exam. (Clear liquids include water, clear tea, black coffee and fruit juice without pulp.)  Please call the Imaging Department at your exam site with any questions.              Future tests that were ordered for you today     Open Future Orders        Priority Expected Expires Ordered    SLEEP EVALUATION & MANAGEMENT REFERRAL - ADULT [facility]  [phone] (Age 2 and up) Routine  11/25/2018 11/25/2017    MRI Cardiac w/contrast Routine 2/28/2018 11/25/2018 11/25/2017            Who to contact     If you have questions or need follow up information about today's clinic visit or your schedule please contact [facility] directly at [phone].  Normal or non-critical lab and imaging results will be communicated to you by MyChart, letter or phone within 4 business days after the clinic has received the results. If you do not hear from us within 7 days, please contact the clinic through [employer] or phone. If you have a critical or abnormal lab result, we will notify you by phone as soon as possible.  Submit refill requests through [employer] or call your pharmacy and they will forward the refill request to us. Please allow 3 business days for your refill to be completed.          Additional Information About Your Visit        [employer] Information     [employer] gives you secure access to your electronic health record. If you see a primary care provider, you can also send messages to your care team and make appointments. If you have questions, please call your primary care clinic.  If you do not have a primary care provider, please call [phone] and they will assist you.        Care EveryWhere ID     This is your Care EveryWhere ID. This could be used by other organizations to access your [facility] medical records  [id number]        Your Vitals Were     Pulse Height Pulse Oximetry BMI (Body Mass Index)  "         65 1.803 m (5' 11\") 99% 34.09 kg/m2         Blood Pressure from Last 3 Encounters:   11/25/17 124/81   11/21/17 123/68   11/17/17 115/61    Weight from Last 3 Encounters:   11/25/17 110.9 kg (244 lb 6.4 oz)   11/21/17 110.2 kg (243 lb)   11/17/17 112.4 kg (247 lb 14.4 oz)              We Performed the Following     EKG 12-lead, tracing only (Future)          Today's Medication Changes          These changes are accurate as of: 11/25/17 10:25 AM.  If you have any questions, ask your nurse or doctor.               Start taking these medicines.        Dose/Directions    zolpidem 10 MG tablet   Commonly known as:  AMBIEN   Used for:  Sleep disorder   Started by:  Scotty Dunaway MD        Dose:  10 mg   Take 1 tablet (10 mg) by mouth nightly as needed for sleep   Quantity:  10 tablet   Refills:  0            Where to get your medicines      Some of these will need a paper prescription and others can be bought over the counter.  Ask your nurse if you have questions.     Bring a paper prescription for each of these medications     zolpidem 10 MG tablet                Primary Care Provider Office Phone # Fax #    Liborio Mercer [phone] [address] [address] [address]        Equal Access to Services     [facility] MARTA AH: Hadii mahin ku hadasho Soomaali, waaxda luqadaha, qaybta kaalmada adeegyada, yanick ball. So [facility] [phone].    ATENCIÓN: Si habla español, tiene a atkins disposición servicios gratuitos de asistencia lingüística. Llame al [phone].    We comply with applicable federal civil rights laws and [place] laws. We do not discriminate on the basis of race, color, national origin, age, disability, sex, sexual orientation, or gender identity.            Thank you!     Thank you for choosing [facility]  for your care. Our goal is always to provide you with excellent care. Hearing back from our patients is one way we can continue to " improve our services. Please take a few minutes to complete the written survey that you may receive in the mail after your visit with us. Thank you!             Your Updated Medication List - Protect others around you: Learn how to safely use, store and throw away your medicines at www.disposemymeds.org.          This list is accurate as of: 11/25/17 10:25 AM.  Always use your most recent med list.                   Brand Name Dispense Instructions for use Diagnosis    aspirin 81 MG EC tablet     90 tablet    Take 1 tablet (81 mg) by mouth daily    ST elevation myocardial infarction involving left anterior descending (LAD) coronary artery (H)       atorvastatin 40 MG tablet    LIPITOR    90 tablet    Take 1 tablet (40 mg) by mouth daily    ST elevation myocardial infarction involving left anterior descending (LAD) coronary artery (H)       benzoyl peroxide 5 % Liqd    BENZOYL PEROXIDE WASH    1 Bottle    Externally apply topically daily as needed    Venous stasis dermatitis of both lower extremities       lisinopril 30 MG tablet    PRINIVIL,ZESTRIL    90 tablet    Take 1 tablet (30 mg) by mouth daily    ST elevation myocardial infarction involving left anterior descending (LAD) coronary artery (H)       metoprolol 25 MG 24 hr tablet    TOPROL-XL    135 tablet    Take 0.5 tablets (12.5 mg) by mouth daily    ST elevation myocardial infarction involving left anterior descending (LAD) coronary artery (H)       order for DME     1 Units    Equipment being ordered: compression stockings above knee 1 pair    Cellulitis due to MRSA       ticagrelor 90 MG tablet    BRILINTA    60 tablet    Take 1 tablet (90 mg) by mouth 2 times daily    ST elevation myocardial infarction involving left anterior descending (LAD) coronary artery (H)       triamcinolone 0.1 % ointment    KENALOG    80 g    Apply sparingly to affected area three times daily for 14 days.    Venous stasis dermatitis of both lower extremities       zolpidem 10 MG  tablet    AMBIEN    10 tablet    Take 1 tablet (10 mg) by mouth nightly as needed for sleep    Sleep disorder

## 2017-11-25 NOTE — PROGRESS NOTES
CARDIOLOGY CLINIC FOLLOW UP    HPI: Liborio Medellin is a 57 year old male with risk factor profile (+) HTN, (-) DM, (-) hypercholesterolemia, (+) 1 pack/week x 30 years [quit tobacco use 2 weeks ago], (+) fam Hx premature CAD, presented to [facility] [place] [facility] on 11/14/17 with crescendo chest pain over a week.  He was seen by a nurse practitioner and was noted to have dynamic ST elevation on the ECG.  The recorded ECG did not show ST elevation.  EMS was called and the patient was emergently transferred to [facility] for further management.  The patient underwent coronary angiography and was found to have a right dominant coronary system with single vessel CAD.  There was a 90% ruptured plaque in the proximal LAD with embolization of thrombus into the apical LAD.  The proximal LAD thrombus was aspirated and the residual stenosis was stented with a 4.0x16 mm Synergy.  Symptom onset to hospital arrival was 2 hours.  Door arrival to PCI 20 minutes with RT radial approach (two attempts).  In spite of aspiration, Dottering, and low inflation POBA, I was not able to re-establish flow into the apical LAD.  The patient had residual chest discomfort at the completion of the procedure and there was 1 mm ST elevation on the telemetry leads.   Troponin anjali from 2.0 to peak 12.9.  ECG shows persistent ST elevation in V4-V5 of 1 mm consistent with apical infarction / injury.  He had a cardiac MRI showing LVEF 49%, with akinesis of the apical inferior and the true apical segments. RVEF was 55%.  Late gadolinium enhancement imaging showed transmural hyperenhancement of the apical inferior segment with microvascular obstruction, consistent with an acute MI involving the apical LAD territory.  Additionally, there were two separate areas of hyperenhancement - a transmural MI involving the mid lateral segment and a small focus in the basal septal segment. Both these were suggestive of embolic MIs in the LAD territory likely related  to the recent PCI. There is approximately 90% viability involving the LAD territory, and 100% viability in the RCA and LCx territories.  He was discharged on ASA, Ticagrelor, Toprol XL 25 mg qd, Lisinopril 30 mg qd, and Lipitor 40 qd.  He had asymptomatic sinus bradycardia in the hospital and had a prolonged QTc of 0.52 that corrected prior to discharge.    It has been 10 days since hospital discharge.  Overall, he had been doing pretty well.  He denies chest pain.  He has had mild WALTON with climbing a flight of steps.  He denies PND, orthopnea, and pedal edema. He denies palpitations, lightheadedness, and syncope.    He quit cigarettes during his recent hospitalization.  He has not started cardiac rehab but it is being scheduled for the upcoming week.    PAST MEDICAL HISTORY:  Past Medical History:   Diagnosis Date     Coronary artery disease 11/17/2017    Anterolateral STEMI, Single vessel CAD (LAD), s/p PCI     Hypertension        CURRENT MEDICATIONS:  Current Outpatient Prescriptions   Medication Sig Dispense Refill     lisinopril (PRINIVIL,ZESTRIL) 30 MG tablet Take 1 tablet (30 mg) by mouth daily 90 tablet 3     atorvastatin (LIPITOR) 40 MG tablet Take 1 tablet (40 mg) by mouth daily 90 tablet 3     aspirin 81 MG EC tablet Take 1 tablet (81 mg) by mouth daily 90 tablet 3     metoprolol (TOPROL-XL) 25 MG 24 hr tablet Take 0.5 tablets (12.5 mg) by mouth daily 135 tablet 3     benzoyl peroxide (BENZOYL PEROXIDE WASH) 5 % LIQD Externally apply topically daily as needed 1 Bottle 1     triamcinolone (KENALOG) 0.1 % ointment Apply sparingly to affected area three times daily for 14 days. 80 g 0     ticagrelor (BRILINTA) 90 MG tablet Take 1 tablet (90 mg) by mouth 2 times daily 60 tablet 11     order for DME Equipment being ordered: compression stockings above knee 1 pair 1 Units 1       PAST SURGICAL HISTORY:  No past surgical history on file.    ALLERGIES  Review of patient's allergies indicates no known  "allergies.    FAMILY HX:  Family History   Problem Relation Age of Onset     Coronary Artery Disease Father      bypass     GASTROINTESTINAL DISEASE Father      Other Cancer Maternal Grandmother      Coronary Artery Disease Paternal Grandmother        SOCIAL HX:  Social History     Social History     Marital status: Single     Spouse name: N/A     Number of children: N/A     Years of education: N/A     Social History Main Topics     Smoking status: Former Smoker     Types: Cigarettes     Smokeless tobacco: Never Used      Comment: Less than 1 pp week.     Alcohol use Yes      Comment: Off and on-weekends.     Drug use: No     Sexual activity: Yes     Other Topics Concern     Parent/Sibling W/ Cabg, Mi Or Angioplasty Before 65f 55m? No     Social History Narrative       ROS:  Constitutional: No fever, chills, or sweats. No weight gain/loss.   HEENT: No visual disturbance, ear ache, epistaxis, sore throat.   Allergies/Immunologic: Negative.   Respiratory: No cough, hemoptysis.   Cardiovascular: As per HPI.   GI: No nausea, vomiting, hematemesis, melena, or hematochezia.   : No urinary frequency, dysuria, or hematuria.   Integument: No rash.   Psychiatric: No anxiety / depression.   Neuro: No speech disturbance, focal sensory or motor deficit.   Endocrinology: No polyuria / polyphagia.   Musculoskeletal: No myalgia.    VITAL SIGNS:  /81 (BP Location: Left arm, Patient Position: Chair, Cuff Size: Adult Large)  Pulse 65  Ht 1.803 m (5' 11\")  Wt 110.9 kg (244 lb 6.4 oz)  SpO2 99%  BMI 34.09 kg/m2  Body mass index is 34.09 kg/(m^2).  Wt Readings from Last 2 Encounters:   11/21/17 110.2 kg (243 lb)   11/17/17 112.4 kg (247 lb 14.4 oz)       PHYSICAL EXAM  Liborio Medellin is a 57 year old male.in no acute distress.  HEENT: Eyes Nonicteric.  Neck: JVP normal.  Carotids +3/3 bilaterally without bruits.  Lungs: CTA.  Cor: RRR. Normal S1 and S2.  No murmur, rub, or gallop.  PMI in Lf 5th ICS.  Abd: Soft, " nontender, nondistended.  NABS.  No pulsatile mass.  Extremities: No C/C/E.  Pulses +3/3 symmetric in upper and lower extremities.  Resolving ecchymosis proximal to right radial arteriotomy site.  Neuro: Grossly intact.  Psych: A&O x 3.  Skin: No rash.  Residual scars from prior infection / venous stasis.    LABS  Recent Labs   Lab Test  17   0656  17   0646   WBC  7.4  8.4   HGB  12.9*  12.8*   HCT  39.4*  39.9*   PLT  261  265     Recent Labs   Lab Test  17   0656  17   0646   NA  140  136   POTASSIUM  4.1  3.9   CHLORIDE  107  106   CO2  25  22   GLC  113*  110*   BUN  22  20   CR  1.01  0.96   HENRIETTA  8.8  8.6     Recent Labs   Lab Test  17   1027  11/15/17   0321   CHOL  115  140   HDL  47  50   LDL  51  77   TRIG  84  65   NHDL  68  90        EK17  SB at 59.  Intervals 0.17/0.10/0.42  Axis 23 degrees  TWI inferiorly.    Biphasic T waves V4-V6.  ST segments isoelectric (< 0.5 mm ST elevation in V3-V4)    Procedures:    ECHO: None    STRESS TEST:  None    Cardiac MRI (2017)  1. The LV is normal in cavity size. The wall thickness is normal. The global systolic function is mildly  decreased. The LVEF is 49%. There is akinesis of the apical inferior and the true apical segments.  2. The RV is normal in cavity size. The global systolic function is normal. The RVEF is 55%.  3. Both atria are normal in size.  4. There is no significant valvular disease.  5. Late gadolinium enhancement imaging shows transmural hyperenhancement of the apical inferior segment with microvascular obstruction, consistent with an acute MI involving the apical LAD territory.  Additionally, there are two separate areas of hyperenhancement - a transmural MI involving the mid lateral  segment and a small focus in the basal septal segment. Both these are suggestive of embolic MIs in the LAD territory likely related to the recent PCI. There is approximately 90% viability involving the LAD  territory, and  100% viability in the RCA and LCx territories.  6. There is no intracardiac thrombus.     CONCLUSIONS: LVEF of 49% and RVEF of 55%, with a transmural MI involving the apical LAD territory and two small embolic MIs, also in the LAD territory.    Coronary angiogram with PCI (11/14/2017)  1. Both coronary arteries arise from their respective cusps.  2. Dominance: Right  3. The LM is without angiographic evidence of disease.   4. LAD: Type 3 [LAD supplies the entire apex]. The LAD gives rise to septal perforators, small D1,and medium sized D2/3.  The pLAD has a 95% acute thrombotic lesion (culprit lesion), mLAD has a 25% stenosis, dLAD has a 30% stenosis.  D2 has a 30% proximal area of stenosis. There were signs of distal embolization of the acute thrombus, the apical LAD was 100% occluded in the very distal segment.  5. LCX gives rise to a large OM1 and medium sized OM2.  There is a small OM3.  The LCx and associated branches are without evidence of disease.  6. RCA gives rise to PL branches and supplies PDA. The pRCA has a 0% stenosis, mRCA has a 0% stenosis, dRCA has a 0% stenosis, RPDA has a 0% stenosis and RPLA has a 0% stenosis.        PERCUTANEOUS CORONARY INTERVENTION:   Lesion #1:  LAD: proximal  A 6 Fr Ikari Left 3.5 was positioned at the ostium of the LMCA.  IV UFH was administered to achieve anticoagulation.      A PT2 wire was advanced across the proximal LAD lesion and positioned in the distal vessel. Aspiration thrombectomy was performed via a 6 Fr Quick Cat Extraction Catheter was used to extract 40 cc with two passes at the lesion.  A 2.5*12mm Emerge balloon was used to pre-dilate the lesion. Following that, a 4.0*16mm Synergy drug eluting stent was successfully deployed across the proximal LAD.  The stent was post-dilated with a 4.5*8 mm non-compliant balloon.  Final angiography showed no evidence of perforation or dissection with residual stenosis of 0% and MONSERRAT 3 flow.  No complications.      To  address the apical acute occlusion, the PT2 wire was passed to the apical LAD. A 2.0*8 mm Emerge balloon was passed to the apical LAD (Dottering) with 1 low pressure inflation to 4 tamar.  While a wire could be passed distally, we were not able to reestablish flow in the apical LAD.  The patient was placed on a G2B3A inhibitor (reopro).       VF Arrest:    Following access the patient had an episode of VF arrest for which a single shock was delivered.  Afterwards he was with hemodynamically stable throughout the rest of the case.      ASSESSMENT AND PLAN:   1. Recent anterolateral MI. Single vessel CAD.  Successful stenting of the proximal LAD.  Moderate infarction by troponin 12.9 and cardiac MRI (LVEF 49%, akinesis of the apical inferior and the true apical segments).  The patient is on appropriate medical therapy including ASA, Ticagrelor, Toprol XL (beta blocker 59), Lisinopril (good dose), and Lipitor.  The patient may have stunned myocardium and I would like to get a repeat cardiac MRI in 3 months to see if the apical function has recovered.  He is starting cardiac rehabilitation this week and we discussed the importance of ongoing smoking cessation, improved nutrition, and increased exercise.    2. Ischemic cardiomyopathy.  LVEF 49%.  No indication for AICD.  BB, ACE-I.  Repeat cardiac LVEF assessment in 3 months.    3. HTN.  Well controlled on current medications.    4. Lipid profile.  LDL 51 mg/dl within day of initiation of statin.  Continue Lipitor 40 mg qd.    5. Smoking cessation.  I discussed the importance of ongoing smoking cessation with the patient for about 5 minutes and he does not intend to resume tobacco use.    6. Prolonged QT.  Resolved on current ECG.    7. Obesity.  Weight loss.  Initiate nutrition counseling in cardiac rehabilitation.    8. Sleep disorder.  Not unexpected.  Patient is a snorer.  STOP-BANG score = 6/8.  High risk for sleep apnea.  Sleep apnea referral.      FOLLOW UP:    Emelyn, one year      CARDIAC MRI (3/1/18):  Comparison CMR: 11/17/2017    1. The left ventricle is mildly dilated. There is apical thinning  otherwise wall thickness is mildly increased.  There is akinesis of the apical inferior, apical lateral, apical septal segments including the true apex. The global systolic function is mildly reduced. The LVEF is 50%.  2. The right ventricle is normal in cavity size. The global systolic function is normal. The RVEF is 53%.   3. Both atria are normal in size.  4. There is no significant valvular disease.   5. There is transmural late gadolinium enhancement of the apical inferior, apical lateral and apical septal segments including the true apex. These findings are consistent with a focal prior myocardial infarction in the LAD territory.   6. There is no pericardial effusion or thickening.  7. There is a small apical thrombus measuring 9.5 x 6.2 mm.    CONCLUSIONS:   1. Ischemic cardiomyopathy with a focal prior myocardial infarction in the LAD territory, LVEF of 50% and RVEF of 53%.   2.  Small apical thrombus measuring 9.5 x 6.2 mm.    When compared to prior CMR from 11/17/2017, there is no evidence of microvascular obstruction but there is now apical thrombus; apical akinesis is unchanged.     ADDENDUM (3/1/18): Coumadin started.  Plan for 3 months and reimage with ECHO.    Addendum:  ECHO noted.  There has been continued improvement in LV functiion.  Apical akinesis remains but no thrombus, Anticoagulation not recommended.        ECHO COMPLETE WITH OPTISON   Status:  Edited Result - FINAL   Visible to patient:  No (Not Released) Dx:  LV (left ventricular) mural thrombus ... Order: [id number]         Details        Reading Physician Reading Date Result Priority       Handy Mejia MD 6/1/2018            Narrative             _______________________________________  __        Interpretation Summary     The visual ejection fraction is estimated at 60-65%.  There is a  small region of apical akinesis.  There is no protruberant, mobile or polypoid, or likely apical thrombus seen  in the left ventricle.  The right ventricle is normal in size and function.  Normal chamber sizes.  Normal valve structure and function. There is no comparison study.                  Scotty Dunaway MD    Divisions of Cardiology  [facility]  Patient Care Team:  Tyler Pitts MD as PCP - General  [facility]TS.

## 2017-11-25 NOTE — PATIENT INSTRUCTIONS
It was a pleasure to see you in the cardiology clinic today.    If you have any questions, you can reach my nurse, Neetu Devine, at [phone].  Press Option #1 for the [facility], and then press Option #3 for nursing.    Note the new medications: None.    Stop the following medications: None    The results from today include:  ECG showed evolving changes from the recent heart attack (expected).    Studies ordered: Cardiac MRI (end of Feb 2018)    Referrals: Sleep Study      I would like you to follow up with Dr. Dunaway in one year.    Sincerely,      Scotty Dunaway MD

## 2017-11-25 NOTE — LETTER
11/25/2017      RE: Liborio Medellin  [address]   [facility] [id number]       Dear Colleague,    Thank you for the opportunity to participate in the care of your patient, Liborio Medellin, at the [facility] HEART [facility]. Please see a copy of my visit note below.    CARDIOLOGY CLINIC FOLLOW UP    HPI: Liborio Medellin is a 57 year old male with risk factor profile (+) HTN, (-) DM, (-) hypercholesterolemia, (+) 1 pack/week x 30 years [quit tobacco use 2 weeks ago], (+) fam Hx premature CAD, presented to [facility] on 11/14/17 with crescendo chest pain over a week.  He was seen by a nurse practitioner and was noted to have dynamic ST elevation on the ECG.  The recorded ECG did not show ST elevation.  EMS was called and the patient was emergently transferred to [facility] for further management.  The patient underwent coronary angiography and was found to have a right dominant coronary system with single vessel CAD.  There was a 90% ruptured plaque in the proximal LAD with embolization of thrombus into the apical LAD.  The proximal LAD thrombus was aspirated and the residual stenosis was stented with a 4.0x16 mm Synergy.  Symptom onset to hospital arrival was 2 hours.  Door arrival to PCI 20 minutes with RT radial approach (two attempts).  In spite of aspiration, Dottering, and low inflation POBA, I was not able to re-establish flow into the apical LAD.  The patient had residual chest discomfort at the completion of the procedure and there was 1 mm ST elevation on the telemetry leads.   Troponin anjali from 2.0 to peak 12.9.  ECG shows persistent ST elevation in V4-V5 of 1 mm consistent with apical infarction / injury.  He had a cardiac MRI showing LVEF 49%, with akinesis of the apical inferior and the true apical segments. RVEF was 55%.  Late gadolinium enhancement imaging showed transmural hyperenhancement of the apical inferior segment with microvascular  obstruction, consistent with an acute MI involving the apical LAD territory.  Additionally, there were two separate areas of hyperenhancement - a transmural MI involving the mid lateral segment and a small focus in the basal septal segment. Both these were suggestive of embolic MIs in the LAD territory likely related to the recent PCI. There is approximately 90% viability involving the LAD territory, and 100% viability in the RCA and LCx territories.  He was discharged on ASA, Ticagrelor, Toprol XL 25 mg qd, Lisinopril 30 mg qd, and Lipitor 40 qd.  He had asymptomatic sinus bradycardia in the hospital and had a prolonged QTc of 0.52 that corrected prior to discharge.    It has been 10 days since hospital discharge.  Overall, he had been doing pretty well.  He denies chest pain.  He has had mild WALTON with climbing a flight of steps.  He denies PND, orthopnea, and pedal edema. He denies palpitations, lightheadedness, and syncope.    He quit cigarettes during his recent hospitalization.  He has not started cardiac rehab but it is being scheduled for the upcoming week.    PAST MEDICAL HISTORY:  Past Medical History:   Diagnosis Date     Coronary artery disease 11/17/2017    Anterolateral STEMI, Single vessel CAD (LAD), s/p PCI     Hypertension        CURRENT MEDICATIONS:  Current Outpatient Prescriptions   Medication Sig Dispense Refill     lisinopril (PRINIVIL,ZESTRIL) 30 MG tablet Take 1 tablet (30 mg) by mouth daily 90 tablet 3     atorvastatin (LIPITOR) 40 MG tablet Take 1 tablet (40 mg) by mouth daily 90 tablet 3     aspirin 81 MG EC tablet Take 1 tablet (81 mg) by mouth daily 90 tablet 3     metoprolol (TOPROL-XL) 25 MG 24 hr tablet Take 0.5 tablets (12.5 mg) by mouth daily 135 tablet 3     benzoyl peroxide (BENZOYL PEROXIDE WASH) 5 % LIQD Externally apply topically daily as needed 1 Bottle 1     triamcinolone (KENALOG) 0.1 % ointment Apply sparingly to affected area three times daily for 14 days. 80 g 0      "ticagrelor (BRILINTA) 90 MG tablet Take 1 tablet (90 mg) by mouth 2 times daily 60 tablet 11     order for DME Equipment being ordered: compression stockings above knee 1 pair 1 Units 1       PAST SURGICAL HISTORY:  No past surgical history on file.    ALLERGIES  Review of patient's allergies indicates no known allergies.    FAMILY HX:  Family History   Problem Relation Age of Onset     Coronary Artery Disease Father      bypass     GASTROINTESTINAL DISEASE Father      Other Cancer Maternal Grandmother      Coronary Artery Disease Paternal Grandmother        SOCIAL HX:  Social History     Social History     Marital status: Single     Spouse name: N/A     Number of children: N/A     Years of education: N/A     Social History Main Topics     Smoking status: Former Smoker     Types: Cigarettes     Smokeless tobacco: Never Used      Comment: Less than 1 pp week.     Alcohol use Yes      Comment: Off and on-weekends.     Drug use: No     Sexual activity: Yes     Other Topics Concern     Parent/Sibling W/ Cabg, Mi Or Angioplasty Before 65f 55m? No     Social History Narrative       ROS:  Constitutional: No fever, chills, or sweats. No weight gain/loss.   HEENT: No visual disturbance, ear ache, epistaxis, sore throat.   Allergies/Immunologic: Negative.   Respiratory: No cough, hemoptysis.   Cardiovascular: As per HPI.   GI: No nausea, vomiting, hematemesis, melena, or hematochezia.   : No urinary frequency, dysuria, or hematuria.   Integument: No rash.   Psychiatric: No anxiety / depression.   Neuro: No speech disturbance, focal sensory or motor deficit.   Endocrinology: No polyuria / polyphagia.   Musculoskeletal: No myalgia.    VITAL SIGNS:  /81 (BP Location: Left arm, Patient Position: Chair, Cuff Size: Adult Large)  Pulse 65  Ht 1.803 m (5' 11\")  Wt 110.9 kg (244 lb 6.4 oz)  SpO2 99%  BMI 34.09 kg/m2  Body mass index is 34.09 kg/(m^2).  Wt Readings from Last 2 Encounters:   11/21/17 110.2 kg (243 lb) "   17 112.4 kg (247 lb 14.4 oz)       PHYSICAL EXAM  Liborio Medellin is a 57 year old male.in no acute distress.  HEENT: Eyes Nonicteric.  Neck: JVP normal.  Carotids +3/3 bilaterally without bruits.  Lungs: CTA.  Cor: RRR. Normal S1 and S2.  No murmur, rub, or gallop.  PMI in Lf 5th ICS.  Abd: Soft, nontender, nondistended.  NABS.  No pulsatile mass.  Extremities: No C/C/E.  Pulses +3/3 symmetric in upper and lower extremities.  Resolving ecchymosis proximal to right radial arteriotomy site.  Neuro: Grossly intact.  Psych: A&O x 3.  Skin: No rash.  Residual scars from prior infection / venous stasis.    LABS  Recent Labs   Lab Test  17   0656  17   0646   WBC  7.4  8.4   HGB  12.9*  12.8*   HCT  39.4*  39.9*   PLT  261  265     Recent Labs   Lab Test  17   0656  17   0646   NA  140  136   POTASSIUM  4.1  3.9   CHLORIDE  107  106   CO2  25  22   GLC  113*  110*   BUN  22  20   CR  1.01  0.96   HENRIETTA  8.8  8.6     Recent Labs   Lab Test  17   1027  11/15/17   0321   CHOL  115  140   HDL  47  50   LDL  51  77   TRIG  84  65   NHDL  68  90        EK17  SB at 59.  Intervals 0.17/0.10/0.42  Axis 23 degrees  TWI inferiorly.    Biphasic T waves V4-V6.  ST segments isoelectric (< 0.5 mm ST elevation in V3-V4)    Procedures:    ECHO: None    STRESS TEST:  None    Cardiac MRI (2017)  1. The LV is normal in cavity size. The wall thickness is normal. The global systolic function is mildly  decreased. The LVEF is 49%. There is akinesis of the apical inferior and the true apical segments.  2. The RV is normal in cavity size. The global systolic function is normal. The RVEF is 55%.  3. Both atria are normal in size.  4. There is no significant valvular disease.  5. Late gadolinium enhancement imaging shows transmural hyperenhancement of the apical inferior segment with microvascular obstruction, consistent with an acute MI involving the apical LAD territory.  Additionally, there  are two separate areas of hyperenhancement - a transmural MI involving the mid lateral  segment and a small focus in the basal septal segment. Both these are suggestive of embolic MIs in the LAD territory likely related to the recent PCI. There is approximately 90% viability involving the LAD  territory, and 100% viability in the RCA and LCx territories.  6. There is no intracardiac thrombus.     CONCLUSIONS: LVEF of 49% and RVEF of 55%, with a transmural MI involving the apical LAD territory and two small embolic MIs, also in the LAD territory.    Coronary angiogram with PCI (11/14/2017)  1. Both coronary arteries arise from their respective cusps.  2. Dominance: Right  3. The LM is without angiographic evidence of disease.   4. LAD: Type 3 [LAD supplies the entire apex]. The LAD gives rise to septal perforators, small D1,and medium sized D2/3.  The pLAD has a 95% acute thrombotic lesion (culprit lesion), mLAD has a 25% stenosis, dLAD has a 30% stenosis.  D2 has a 30% proximal area of stenosis. There were signs of distal embolization of the acute thrombus, the apical LAD was 100% occluded in the very distal segment.  5. LCX gives rise to a large OM1 and medium sized OM2.  There is a small OM3.  The LCx and associated branches are without evidence of disease.  6. RCA gives rise to PL branches and supplies PDA. The pRCA has a 0% stenosis, mRCA has a 0% stenosis, dRCA has a 0% stenosis, RPDA has a 0% stenosis and RPLA has a 0% stenosis.        PERCUTANEOUS CORONARY INTERVENTION:   Lesion #1:  LAD: proximal  A 6 Fr Ikari Left 3.5 was positioned at the ostium of the LMCA.  IV UFH was administered to achieve anticoagulation.      A PT2 wire was advanced across the proximal LAD lesion and positioned in the distal vessel. Aspiration thrombectomy was performed via a 6 Fr Quick Cat Extraction Catheter was used to extract 40 cc with two passes at the lesion.  A 2.5*12mm Emerge balloon was used to pre-dilate the lesion.  Following that, a 4.0*16mm Synergy drug eluting stent was successfully deployed across the proximal LAD.  The stent was post-dilated with a 4.5*8 mm non-compliant balloon.  Final angiography showed no evidence of perforation or dissection with residual stenosis of 0% and MONSERRAT 3 flow.  No complications.      To address the apical acute occlusion, the PT2 wire was passed to the apical LAD. A 2.0*8 mm Emerge balloon was passed to the apical LAD (Dottering) with 1 low pressure inflation to 4 tamar.  While a wire could be passed distally, we were not able to reestablish flow in the apical LAD.  The patient was placed on a G2B3A inhibitor (reopro).       VF Arrest:    Following access the patient had an episode of VF arrest for which a single shock was delivered.  Afterwards he was with hemodynamically stable throughout the rest of the case.      ASSESSMENT AND PLAN:   1. Recent anterolateral MI. Single vessel CAD.  Successful stenting of the proximal LAD.  Moderate infarction by troponin 12.9 and cardiac MRI (LVEF 49%, akinesis of the apical inferior and the true apical segments).  The patient is on appropriate medical therapy including ASA, Ticagrelor, Toprol XL (beta blocker 59), Lisinopril (good dose), and Lipitor.  The patient may have stunned myocardium and I would like to get a repeat cardiac MRI in 3 months to see if the apical function has recovered.  He is starting cardiac rehabilitation this week and we discussed the importance of ongoing smoking cessation, improved nutrition, and increased exercise.    2. Ischemic cardiomyopathy.  LVEF 49%.  No indication for AICD.  BB, ACE-I.  Repeat cardiac LVEF assessment in 3 months.    3. HTN.  Well controlled on current medications.    4. Lipid profile.  LDL 51 mg/dl within day of initiation of statin.  Continue Lipitor 40 mg qd.    5. Smoking cessation.  I discussed the importance of ongoing smoking cessation with the patient for about 5 minutes and he does not intend to  resume tobacco use.    6. Prolonged QT.  Resolved on current ECG.    7. Obesity.  Weight loss.  Initiate nutrition counseling in cardiac rehabilitation.    8. Sleep disorder.  Not unexpected.  Patient is a snorer.  STOP-BANG score = 6/8.  High risk for sleep apnea.  Sleep apnea referral.      FOLLOW UP:  Dr. Dunaway, one year      Scotty Dunaway MD    Divisions of Cardiology  [facility]  Patient Care Team:  Tyler Pitts MD as PCP - General  [facility].

## 2017-11-27 NOTE — PROGRESS NOTES
Please inform patient that test result was within normal parameters.   Thank you.     Inge Vaughan M.D.

## 2017-12-07 ENCOUNTER — HOSPITAL ENCOUNTER (OUTPATIENT)
Dept: CARDIAC REHAB | Facility: CLINIC | Age: 57
End: 2017-12-07
Attending: FAMILY MEDICINE
Payer: MEDICAID

## 2017-12-07 PROCEDURE — 93798 PHYS/QHP OP CAR RHAB W/ECG: CPT

## 2017-12-07 PROCEDURE — 40000575 ZZH STATISTIC OP CARDIAC VISIT #2

## 2017-12-07 PROCEDURE — 93797 PHYS/QHP OP CAR RHAB WO ECG: CPT

## 2017-12-07 PROCEDURE — 40000116 ZZH STATISTIC OP CR VISIT

## 2017-12-11 ENCOUNTER — HOSPITAL ENCOUNTER (OUTPATIENT)
Dept: CARDIAC REHAB | Facility: CLINIC | Age: 57
End: 2017-12-11
Attending: FAMILY MEDICINE
Payer: MEDICAID

## 2017-12-11 PROCEDURE — 40000575 ZZH STATISTIC OP CARDIAC VISIT #2

## 2017-12-11 PROCEDURE — 93797 PHYS/QHP OP CAR RHAB WO ECG: CPT

## 2017-12-11 PROCEDURE — 40000116 ZZH STATISTIC OP CR VISIT

## 2017-12-11 PROCEDURE — 93798 PHYS/QHP OP CAR RHAB W/ECG: CPT

## 2017-12-12 ENCOUNTER — OFFICE VISIT (OUTPATIENT)
Dept: SPIRITUAL SERVICES | Facility: CLINIC | Age: 57
End: 2017-12-12

## 2017-12-12 NOTE — PROGRESS NOTES
" Spirituality Group Note    [facility] - [facility]    Name:    Liborio Medellin                                                                     YOB: 1960    MRN:     [id number]                                                                       Age: 57 year old      Patient, Liborio, attended -led group, which included discussion of spirituality, coping with stress relating to illness, building resilience and viewing video on \"Managing Your Stress.\"    Patient attended group for 1.0 hrs.    The patient actively participated in group discussion.  This was his first day in CR and he shared some of his health narrative relating to his cardiac event.      Hugo Berkowitz M.A., [facility]  Staff    Spiritual Health Services  [facility]  [phone] (Office)  [phone] (Pager)  [email or web address]    "

## 2017-12-13 ENCOUNTER — HOSPITAL ENCOUNTER (OUTPATIENT)
Dept: CARDIAC REHAB | Facility: CLINIC | Age: 57
End: 2017-12-13
Attending: FAMILY MEDICINE
Payer: MEDICAID

## 2017-12-13 PROCEDURE — 93797 PHYS/QHP OP CAR RHAB WO ECG: CPT

## 2017-12-13 PROCEDURE — 93798 PHYS/QHP OP CAR RHAB W/ECG: CPT

## 2017-12-13 PROCEDURE — 40000116 ZZH STATISTIC OP CR VISIT

## 2017-12-13 PROCEDURE — 40000575 ZZH STATISTIC OP CARDIAC VISIT #2

## 2017-12-15 ENCOUNTER — HOSPITAL ENCOUNTER (OUTPATIENT)
Dept: CARDIAC REHAB | Facility: CLINIC | Age: 57
End: 2017-12-15
Attending: FAMILY MEDICINE
Payer: MEDICAID

## 2017-12-15 PROCEDURE — 40000116 ZZH STATISTIC OP CR VISIT

## 2017-12-15 PROCEDURE — 93798 PHYS/QHP OP CAR RHAB W/ECG: CPT

## 2017-12-20 ENCOUNTER — HOSPITAL ENCOUNTER (OUTPATIENT)
Dept: CARDIAC REHAB | Facility: CLINIC | Age: 57
End: 2017-12-20
Attending: FAMILY MEDICINE
Payer: MEDICAID

## 2017-12-20 DIAGNOSIS — I21.02 ST ELEVATION MYOCARDIAL INFARCTION INVOLVING LEFT ANTERIOR DESCENDING (LAD) CORONARY ARTERY (H): ICD-10-CM

## 2017-12-20 PROCEDURE — 93798 PHYS/QHP OP CAR RHAB W/ECG: CPT | Performed by: REHABILITATION PRACTITIONER

## 2017-12-20 PROCEDURE — 40000116 ZZH STATISTIC OP CR VISIT: Performed by: REHABILITATION PRACTITIONER

## 2017-12-29 ENCOUNTER — HOSPITAL ENCOUNTER (OUTPATIENT)
Dept: CARDIAC REHAB | Facility: CLINIC | Age: 57
End: 2017-12-29
Attending: FAMILY MEDICINE
Payer: MEDICAID

## 2017-12-29 PROCEDURE — 40000116 ZZH STATISTIC OP CR VISIT

## 2017-12-29 PROCEDURE — 93798 PHYS/QHP OP CAR RHAB W/ECG: CPT

## 2018-01-29 DIAGNOSIS — I25.119 CORONARY ARTERY DISEASE INVOLVING NATIVE HEART WITH ANGINA PECTORIS, UNSPECIFIED VESSEL OR LESION TYPE (H): Primary | ICD-10-CM

## 2018-01-29 RX ORDER — CLOPIDOGREL BISULFATE 75 MG/1
75 TABLET ORAL DAILY
Qty: 90 TABLET | Refills: 1 | Status: SHIPPED | OUTPATIENT
Start: 2018-01-29 | End: 2018-08-09

## 2018-01-30 ENCOUNTER — TELEPHONE (OUTPATIENT)
Dept: CARDIOLOGY | Facility: CLINIC | Age: 58
End: 2018-01-30

## 2018-02-27 ENCOUNTER — HOSPITAL ENCOUNTER (OUTPATIENT)
Dept: MRI IMAGING | Facility: CLINIC | Age: 58
Discharge: HOME | End: 2018-02-27
Attending: INTERNAL MEDICINE | Admitting: INTERNAL MEDICINE
Payer: MEDICAID

## 2018-02-27 DIAGNOSIS — I21.9: ICD-10-CM

## 2018-02-27 PROCEDURE — A9585 GADOBUTROL INJECTION: HCPCS | Performed by: INTERNAL MEDICINE

## 2018-02-27 PROCEDURE — 75561 CARDIAC MRI FOR MORPH W/DYE: CPT

## 2018-02-27 PROCEDURE — 25000128 H RX IP 250 OP 636: Performed by: INTERNAL MEDICINE

## 2018-02-27 PROCEDURE — 75561 CARDIAC MRI FOR MORPH W/DYE: CPT | Mod: 26 | Performed by: INTERNAL MEDICINE

## 2018-02-27 RX ORDER — GADOBUTROL 604.72 MG/ML
7.5 INJECTION INTRAVENOUS ONCE
Status: COMPLETED | OUTPATIENT
Start: 2018-02-27 | End: 2018-02-27

## 2018-02-27 RX ADMIN — GADOBUTROL 6.5 ML: 604.72 INJECTION INTRAVENOUS at 11:59

## 2018-02-27 RX ADMIN — GADOBUTROL 7.5 ML: 604.72 INJECTION INTRAVENOUS at 11:59

## 2018-02-28 ENCOUNTER — CARE COORDINATION (OUTPATIENT)
Dept: CARDIOLOGY | Facility: CLINIC | Age: 58
End: 2018-02-28

## 2018-02-28 DIAGNOSIS — I23.6 LEFT VENTRICULAR APICAL THROMBUS FOLLOWING MI (H): Primary | ICD-10-CM

## 2018-02-28 RX ORDER — WARFARIN SODIUM 4 MG/1
4 TABLET ORAL DAILY
Qty: 90 TABLET | Refills: 1 | Status: SHIPPED | OUTPATIENT
Start: 2018-02-28 | End: 2018-06-27

## 2018-02-28 NOTE — PROGRESS NOTES
Date: 2/28/2018    Time of Call: 5:29 PM     Diagnosis:  LV clot     [ TORB ] Ordering provider: Dr. Scotty Dunaway  Order: start warfarin 4 mg by mouth daily for LV clot  INR and anticoagulation referral for INR of 2-3     Order received by: Neetu Devien RN     Follow-up/additional notes: patient can discontinue aspirin when INR is therapeutic (> 2.0). Patient understands and agrees to the plan.

## 2018-03-01 ENCOUNTER — ANTICOAGULATION THERAPY VISIT (OUTPATIENT)
Dept: ANTICOAGULATION | Facility: CLINIC | Age: 58
End: 2018-03-01

## 2018-03-01 DIAGNOSIS — I23.6 ATRIAL THROMBUS FOLLOWING MI (H): ICD-10-CM

## 2018-03-01 DIAGNOSIS — I23.6 LEFT VENTRICULAR APICAL THROMBUS FOLLOWING MI (H): ICD-10-CM

## 2018-03-01 DIAGNOSIS — I21.3 ATRIAL THROMBUS FOLLOWING MI (H): ICD-10-CM

## 2018-03-01 DIAGNOSIS — Z79.01 LONG-TERM (CURRENT) USE OF ANTICOAGULANTS: ICD-10-CM

## 2018-03-01 NOTE — MR AVS SNAPSHOT
Liborio JEOVANY Leung   3/1/2018   Anticoagulation Therapy Visit    Description:  57 year old male   Provider:  Nichelle Langston, RN   Department:  [facility]           INR as of 3/1/2018     Today's INR No new INR was available at the time of this encounter.      Anticoagulation Summary as of 3/1/2018     INR goal 2.0-3.0   Today's INR No new INR was available at the time of this encounter.   Full instructions 3/1: 4 mg; 3/2: 4 mg; 3/3: 4 mg; 3/4: 4 mg; Otherwise No maintenance plan   Next INR check 3/5/2018    Indications   Long-term (current) use of anticoagulants [Z79.01] [Z79.01]  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) [I21.29  I23.6]         March 2018 Details    Sun Mon Tue Wed Thu Fri Sat         1      4 mg   See details      2      4 mg         3      4 mg           4      4 mg         5            6               7               8               9               10                 11               12               13               14               15               16               17                 18               19               20               21               22               23               24                 25               26               27               28               29               30               31                Date Details   03/01 This INR check       Date of next INR:  3/5/2018         How to take your warfarin dose     To take:  4 mg Take 1 of the 4 mg tablets.

## 2018-03-01 NOTE — PROGRESS NOTES
Received a referral to follow pt. Per [facility] note from Renée Crawford RN pt to start Warfarin 4mg daily, but no date of when pt is going to labs to get an INR. Left a vm for pt to call us back and let us know when he was thinking of getting an INR and also would like to introduce the clinic. Will await for pt to call back     Addendum Pt called back reporting he is planning on getting an INR drawn on Friday 3/2. Also pt was wondering how long he is suppose to be on Warfarin and writer explained what Dr. Dunaway ordered for the duration as of end of August. Writer also explained about how many labs at the beginning of starting Warfarin. Pt communicated understanding. Nichelle Langston RN

## 2018-03-02 ENCOUNTER — ANTICOAGULATION THERAPY VISIT (OUTPATIENT)
Dept: ANTICOAGULATION | Facility: CLINIC | Age: 58
End: 2018-03-02

## 2018-03-02 DIAGNOSIS — I23.6 LEFT VENTRICULAR APICAL THROMBUS FOLLOWING MI (H): ICD-10-CM

## 2018-03-02 DIAGNOSIS — Z79.01 LONG-TERM (CURRENT) USE OF ANTICOAGULANTS: ICD-10-CM

## 2018-03-02 LAB — INR PPP: 1 (ref 0.86–1.14)

## 2018-03-02 PROCEDURE — 36415 COLL VENOUS BLD VENIPUNCTURE: CPT | Performed by: INTERNAL MEDICINE

## 2018-03-02 PROCEDURE — 85610 PROTHROMBIN TIME: CPT | Performed by: INTERNAL MEDICINE

## 2018-03-02 NOTE — MR AVS SNAPSHOT
Liborio Medellin   3/2/2018   Anticoagulation Therapy Visit    Description:  57 year old male   Provider:  Zoila Mcleod, RN   Department:  [facility]           INR as of 3/2/2018     Today's INR       Anticoagulation Summary as of 3/2/2018     INR goal 2.0-3.0   Today's INR    Full instructions 3/2: 4 mg; 3/3: 4 mg; 3/4: 4 mg; Otherwise No maintenance plan   Next INR check 3/5/2018    Indications   Long-term (current) use of anticoagulants [Z79.01] [Z79.01]  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) [I21.29  I23.6]         March 2018 Details    Sun Mon Tue Wed Thu Fri Sat         1               2      4 mg   See details      3      4 mg           4      4 mg         5            6               7               8               9               10                 11               12               13               14               15               16               17                 18               19               20               21               22               23               24                 25               26               27               28               29               30               31                Date Details   03/02 This INR check       Date of next INR:  3/5/2018         How to take your warfarin dose     To take:  4 mg Take 1 of the 4 mg tablets.

## 2018-03-02 NOTE — PROGRESS NOTES
ANTICOAGULATION FOLLOW-UP CLINIC VISIT    Patient Name:  Liborio THAYER Underdahl  Date:  3/2/2018  Contact Type:  Telephone    SUBJECTIVE:     Patient Findings     Positives Initiation of therapy    Comments Liborio started warfarin 2/28/18           OBJECTIVE    INR   Date Value Ref Range Status   03/02/2018 1.00 0.86 - 1.14 Final       ASSESSMENT / PLAN  INR assessment SUB    Recheck INR In: 3 DAYS    INR Location Clinic      Anticoagulation Summary as of 3/2/2018     INR goal 2.0-3.0   Today's INR    Maintenance plan No maintenance plan   Full instructions 3/2: 4 mg; 3/3: 4 mg; 3/4: 4 mg; Otherwise No maintenance plan   Next INR check 3/5/2018   Priority INR   Target end date     Indications   Long-term (current) use of anticoagulants [Z79.01] [Z79.01]  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) [I21.29  I23.6]         Anticoagulation Episode Summary     INR check location     Preferred lab     Send INR reminders to  [facility] CL[facility]IC    Comments HIPPA Form mailed 3/1/18  Uses [facility] +++3/1/18 Can STOP ASA once INR is therapeutic (>2.00)+++  Duration 6 Months (August 28, 2018)      Anticoagulation Care Providers     Provider Role Specialty Phone number    Scotty Dunaway MD [facility] Internal Medicine - Interventional Cardiology [phone]            See the Encounter Report to view Anticoagulation Flowsheet and Dosing Calendar (Go to Encounters tab in chart review, and find the Anticoagulation Therapy Visit)    Spoke with Liborio.  He will continue with warfarin 4 mg daily and recheck 3/5/18.      Zoila Mcleod RN

## 2018-03-07 DIAGNOSIS — Z79.01 LONG-TERM (CURRENT) USE OF ANTICOAGULANTS: ICD-10-CM

## 2018-03-07 DIAGNOSIS — I23.6 LEFT VENTRICULAR APICAL THROMBUS FOLLOWING MI (H): ICD-10-CM

## 2018-03-07 DIAGNOSIS — I23.6 LV (LEFT VENTRICULAR) MURAL THROMBUS FOLLOWING MI (H): Primary | ICD-10-CM

## 2018-03-07 LAB — INR PPP: 1.18 (ref 0.86–1.14)

## 2018-03-07 PROCEDURE — 36415 COLL VENOUS BLD VENIPUNCTURE: CPT | Performed by: INTERNAL MEDICINE

## 2018-03-07 PROCEDURE — 85610 PROTHROMBIN TIME: CPT | Performed by: INTERNAL MEDICINE

## 2018-03-08 ENCOUNTER — ANTICOAGULATION THERAPY VISIT (OUTPATIENT)
Dept: ANTICOAGULATION | Facility: CLINIC | Age: 58
End: 2018-03-08

## 2018-03-08 DIAGNOSIS — I23.6 LEFT VENTRICULAR APICAL THROMBUS FOLLOWING MI (H): ICD-10-CM

## 2018-03-08 DIAGNOSIS — Z79.01 LONG-TERM (CURRENT) USE OF ANTICOAGULANTS: ICD-10-CM

## 2018-03-08 NOTE — MR AVS SNAPSHOT
Liborio JEOVANY Medellin   3/8/2018   Anticoagulation Therapy Visit    Description:  57 year old male   Provider:  Megan Maurer, RN   Department:  [facility]           INR as of 3/8/2018     Today's INR 1.18! (3/7/2018)      Anticoagulation Summary as of 3/8/2018     INR goal 2.0-3.0   Today's INR 1.18! (3/7/2018)   Full instructions 3/8: 8 mg; 3/9: 8 mg; 3/10: 8 mg; 3/11: 4 mg; Otherwise No maintenance plan   Next INR check 3/12/2018    Indications   Long-term (current) use of anticoagulants [Z79.01] [Z79.01]  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) [I21.29  I23.6]         March 2018 Details    Sun Mon Tue Wed Thu Fri Sat         1               2               3                 4               5               6               7               8      8 mg   See details      9      8 mg         10      8 mg           11      4 mg         12            13               14               15               16               17                 18               19               20               21               22               23               24                 25               26               27               28               29               30               31                Date Details   03/08 This INR check       Date of next INR:  3/12/2018         How to take your warfarin dose     To take:  4 mg Take 1 of the 4 mg tablets.    To take:  8 mg Take 2 of the 4 mg tablets.

## 2018-03-08 NOTE — PROGRESS NOTES
ANTICOAGULATION FOLLOW-UP CLINIC VISIT    Patient Name:  Liborio THAYER Underdahl  Date:  3/8/2018  Contact Type:  Telephone    SUBJECTIVE:     Patient Findings     Comments Liborio reports that he as been taking 4mg daily since he started warfarin.           OBJECTIVE    INR   Date Value Ref Range Status   03/07/2018 1.18 (H) 0.86 - 1.14 Final       ASSESSMENT / PLAN  INR assessment SUB    Recheck INR In: 4 DAYS    INR Location Clinic      Anticoagulation Summary as of 3/8/2018     INR goal 2.0-3.0   Today's INR 1.18! 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) [I21.29  I23.6]         Anticoagulation Episode Summary     INR check location     Preferred lab     Send INR reminders to [facility]    Comments HIPPA Form mailed 3/1/18  Uses [facility] +++3/1/18 Can STOP ASA once INR is therapeutic (>2.00)+++  Duration 6 Months (August 28, 2018)      Anticoagulation Care Providers     Provider Role Specialty Phone number    Scotty Dunaway MD [facility] Internal Medicine - Interventional Cardiology [phone]            See the Encounter Report to view Anticoagulation Flowsheet and Dosing Calendar (Go to Encounters tab in chart review, and find the Anticoagulation Therapy Visit)    Spoke with Liborio.     Megan Maurer RN

## 2018-03-12 ENCOUNTER — ANTICOAGULATION THERAPY VISIT (OUTPATIENT)
Dept: ANTICOAGULATION | Facility: CLINIC | Age: 58
End: 2018-03-12

## 2018-03-12 DIAGNOSIS — I23.6 LEFT VENTRICULAR APICAL THROMBUS FOLLOWING MI (H): ICD-10-CM

## 2018-03-12 DIAGNOSIS — Z79.01 LONG-TERM (CURRENT) USE OF ANTICOAGULANTS: ICD-10-CM

## 2018-03-12 LAB — INR PPP: 1.91 (ref 0.86–1.14)

## 2018-03-12 PROCEDURE — 36415 COLL VENOUS BLD VENIPUNCTURE: CPT | Performed by: INTERNAL MEDICINE

## 2018-03-12 PROCEDURE — 85610 PROTHROMBIN TIME: CPT | Performed by: INTERNAL MEDICINE

## 2018-03-12 NOTE — MR AVS SNAPSHOT
Liborio Medellin   3/12/2018   Anticoagulation Therapy Visit    Description:  57 year old male   Provider:  Nichelle Langston, RN   Department:  [facility]           INR as of 3/12/2018     Today's INR 1.91!      Anticoagulation Summary as of 3/12/2018     INR goal 2.0-3.0   Today's INR 1.91!   Full instructions 3/12: 4 mg; 3/13: 4 mg; Otherwise No maintenance plan   Next INR check 3/14/2018    Indications   Long-term (current) use of anticoagulants [Z79.01] [Z79.01]  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) [I21.29  I23.6]         March 2018 Details    Sun Mon Tue Wed Thu Fri Sat         1               2               3                 4               5               6               7               8               9               10                 11               12      4 mg   See details      13      4 mg         14            15               16               17                 18               19               20               21               22               23               24                 25               26               27               28               29               30               31                Date Details   03/12 This INR check       Date of next INR:  3/14/2018         How to take your warfarin dose     To take:  4 mg Take 1 of the 4 mg tablets.

## 2018-03-12 NOTE — PROGRESS NOTES
ANTICOAGULATION FOLLOW-UP CLINIC VISIT    Patient Name:  Liborio Dupreedahl  Date:  3/12/2018  Contact Type:  Telephone    SUBJECTIVE:        OBJECTIVE    INR   Date Value Ref Range Status   03/12/2018 1.91 (H) 0.86 - 1.14 Final       ASSESSMENT / PLAN  INR assessment THER    Recheck INR In: 2 DAYS    INR Location Clinic      Anticoagulation Summary as of 3/12/2018     INR goal 2.0-3.0   Today's INR 1.91!   Maintenance plan No maintenance plan   Full instructions 3/12: 4 mg; 3/13: 4 mg; Otherwise No maintenance plan   Next INR check 3/14/2018   Priority INR   Target end date     Indications   Long-term (current) use of anticoagulants [Z79.01] [Z79.01]  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) [I21.29  I23.6]         Anticoagulation Episode Summary     INR check location     Preferred lab     Send INR reminders to [facility]    Comments HIPPA Form mailed 3/1/18  Uses [facility] +++3/1/18 Can STOP ASA once INR is therapeutic (>2.00)+++  Duration 6 Months (August 28, 2018)      Anticoagulation Care Providers     Provider Role Specialty Phone number    Scotty Dunaway MD Responsible Internal Medicine - Interventional Cardiology [phone]            See the Encounter Report to view Anticoagulation Flowsheet and Dosing Calendar (Go to Encounters tab in chart review, and find the Anticoagulation Therapy Visit)    Left message for patient with results and dosing recommendations. Asked patient to call back to report any missed doses, falls, signs and symptoms of bleeding or clotting, any changes in health, medication, or diet. Asked patient to call back with any questions or concerns.     Pt might benefit from a maintenance dose of 8mg three times a week and 4mg ROW, but unsure if this would be a benefit dose     Nichelle Langston RN

## 2018-03-15 DIAGNOSIS — I23.6 LEFT VENTRICULAR APICAL THROMBUS FOLLOWING MI (H): ICD-10-CM

## 2018-03-15 DIAGNOSIS — Z79.01 LONG-TERM (CURRENT) USE OF ANTICOAGULANTS: ICD-10-CM

## 2018-03-15 LAB — INR PPP: 1.48 (ref 0.86–1.14)

## 2018-03-15 PROCEDURE — 36415 COLL VENOUS BLD VENIPUNCTURE: CPT | Performed by: INTERNAL MEDICINE

## 2018-03-15 PROCEDURE — 85610 PROTHROMBIN TIME: CPT | Performed by: INTERNAL MEDICINE

## 2018-03-16 ENCOUNTER — ANTICOAGULATION THERAPY VISIT (OUTPATIENT)
Dept: ANTICOAGULATION | Facility: CLINIC | Age: 58
End: 2018-03-16

## 2018-03-16 DIAGNOSIS — Z79.01 LONG-TERM (CURRENT) USE OF ANTICOAGULANTS: ICD-10-CM

## 2018-03-16 DIAGNOSIS — I23.6 LEFT VENTRICULAR APICAL THROMBUS FOLLOWING MI (H): ICD-10-CM

## 2018-03-16 NOTE — PROGRESS NOTES
ANTICOAGULATION FOLLOW-UP CLINIC VISIT    Patient Name:  Liborio Leunghl  Date:  3/16/2018  Contact Type:  Telephone    SUBJECTIVE:     Patient Findings     Positives No Problem Findings           OBJECTIVE    INR   Date Value Ref Range Status   03/15/2018 1.48 (H) 0.86 - 1.14 Final       ASSESSMENT / PLAN  INR assessment SUB    Recheck INR In: 3 DAYS    INR Location Clinic      Anticoagulation Summary as of 3/16/2018     INR goal 2.0-3.0   Today's INR 1.48! (3/15/2018)   Maintenance plan No maintenance plan   Full instructions 3/16: 8 mg; 3/17: 8 mg; 3/18: 8 mg; Otherwise No maintenance plan   Next INR check 3/19/2018   Priority INR   Target end date     Indications   Long-term (current) use of anticoagulants [Z79.01] [Z79.01]  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) [I21.29  I23.6]         Anticoagulation Episode Summary     INR check location     Preferred lab     Send INR reminders to [facility]    Comments HIPPA Form mailed 3/1/18  Uses [facility] +++3/1/18 Can STOP ASA once INR is therapeutic (>2.00)+++  Duration 6 Months (August 28, 2018)      Anticoagulation Care Providers     Provider Role Specialty Phone number    Scotty Dunaway MD [facility] Internal Medicine - Interventional Cardiology [phone]            See the Encounter Report to view Anticoagulation Flowsheet and Dosing Calendar (Go to Encounters tab in chart review, and find the Anticoagulation Therapy Visit)  Spoke with patient.    Layne Cruz RN

## 2018-03-16 NOTE — MR AVS SNAPSHOT
Liborio JEOVANY Medellin   3/16/2018   Anticoagulation Therapy Visit    Description:  57 year old male   Provider:  Layne Cruz RN   Department:  [facility]           INR as of 3/16/2018     Today's INR 1.48! (3/15/2018)      Anticoagulation Summary as of 3/16/2018     INR goal 2.0-3.0   Today's INR 1.48! (3/15/2018)   Full instructions 3/16: 8 mg; 3/17: 8 mg; 3/18: 8 mg; Otherwise No maintenance plan   Next INR check 3/19/2018    Indications   Long-term (current) use of anticoagulants [Z79.01] [Z79.01]  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) [I21.29  I23.6]         March 2018 Details    Sun Mon Tue Wed Thu Fri Sat         1               2               3                 4               5               6               7               8               9               10                 11               12               13               14               15               16      8 mg   See details      17      8 mg           18      8 mg         19            20               21               22               23               24                 25               26               27               28               29               30               31                Date Details   03/16 This INR check       Date of next INR:  3/19/2018         How to take your warfarin dose     To take:  8 mg Take 2 of the 4 mg tablets.

## 2018-03-19 DIAGNOSIS — Z79.01 LONG-TERM (CURRENT) USE OF ANTICOAGULANTS: ICD-10-CM

## 2018-03-19 DIAGNOSIS — I23.6 LEFT VENTRICULAR APICAL THROMBUS FOLLOWING MI (H): ICD-10-CM

## 2018-03-19 LAB — INR PPP: 1.55 (ref 0.86–1.14)

## 2018-03-19 PROCEDURE — 36415 COLL VENOUS BLD VENIPUNCTURE: CPT | Performed by: INTERNAL MEDICINE

## 2018-03-19 PROCEDURE — 85610 PROTHROMBIN TIME: CPT | Performed by: INTERNAL MEDICINE

## 2018-03-20 ENCOUNTER — ANTICOAGULATION THERAPY VISIT (OUTPATIENT)
Dept: ANTICOAGULATION | Facility: CLINIC | Age: 58
End: 2018-03-20

## 2018-03-20 DIAGNOSIS — Z79.01 LONG-TERM (CURRENT) USE OF ANTICOAGULANTS: ICD-10-CM

## 2018-03-20 DIAGNOSIS — I23.6 LEFT VENTRICULAR APICAL THROMBUS FOLLOWING MI (H): ICD-10-CM

## 2018-03-20 NOTE — MR AVS SNAPSHOT
Liborio Medellin   3/20/2018   Anticoagulation Therapy Visit    Description:  57 year old male   Provider:  Inocente Teran   Department:  [facility]           INR as of 3/20/2018     Today's INR 1.55! (3/19/2018)      Anticoagulation Summary as of 3/20/2018     INR goal 2.0-3.0   Today's INR 1.55! (3/19/2018)   Full instructions 3/20: 8 mg; 3/21: 6 mg; 3/22: 8 mg; Otherwise No maintenance plan   Next INR check 3/23/2018    Indications   Long-term (current) use of anticoagulants [Z79.01] [Z79.01]  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) [I21.29  I23.6]         March 2018 Details    Sun Mon Tue Wed Thu Fri Sat         1               2               3                 4               5               6               7               8               9               10                 11               12               13               14               15               16               17                 18               19               20      8 mg   See details      21      6 mg         22      8 mg         23            24                 25               26               27               28               29               30               31                Date Details   03/20 This INR check       Date of next INR:  3/23/2018         How to take your warfarin dose     To take:  6 mg Take 1.5 of the 4 mg tablets.    To take:  8 mg Take 2 of the 4 mg tablets.

## 2018-03-20 NOTE — PROGRESS NOTES
ANTICOAGULATION FOLLOW-UP CLINIC VISIT    Patient Name:  Liborio THAYER Underdahl  Date:  3/20/2018  Contact Type:  Telephone    SUBJECTIVE:     Patient Findings     Positives Unexplained INR or factor level change (Subtherapeutic INR result 1.55 today.)    Comments Spoke to Liborio.  Answered his questions about symptoms when INR is below goal.  Recommended alternating 6mg and 8mg of Coumadin this week .  Will check an INR on Friday to determine if adjustments need to be considered for Liborio's maintenance dosing of coumadin.           OBJECTIVE    INR   Date Value Ref Range Status   03/19/2018 1.55 (H) 0.86 - 1.14 Final       ASSESSMENT / PLAN  INR assessment SUB    Recheck INR In: 3 DAYS    INR Location Clinic      Anticoagulation Summary as of 3/20/2018     INR goal 2.0-3.0   Today's INR 1.55! (3/19/2018)   Maintenance plan No maintenance plan   Full instructions 3/20: 8 mg; 3/21: 6 mg; 3/22: 8 mg; Otherwise No maintenance plan   Next INR check 3/23/2018   Priority INR   Target end date     Indications   Long-term (current) use of anticoagulants [Z79.01] [Z79.01]  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) [I21.29  I23.6]         Anticoagulation Episode Summary     INR check location     Preferred lab     Send INR reminders to [facility]    Comments HIPPA Form mailed 3/1/18  Uses [facility] +++3/1/18 Can STOP ASA once INR is therapeutic (>2.00)+++  Duration 6 Months (August 28, 2018)      Anticoagulation Care Providers     Provider Role Specialty Phone number    Scotty Dunaway MD [facility] Internal Medicine - Interventional Cardiology [phone]            See the Encounter Report to view Anticoagulation Flowsheet and Dosing Calendar (Go to Encounters tab in chart review, and find the Anticoagulation Therapy Visit)    Spoke with patient. Gave them their lab results and new warfarin recommendation.  No changes in health, medication, or diet. No missed doses, no falls. No signs or symptoms  of bleed or clotting.    Inocente Teran, RN

## 2018-03-23 ENCOUNTER — ANTICOAGULATION THERAPY VISIT (OUTPATIENT)
Dept: ANTICOAGULATION | Facility: CLINIC | Age: 58
End: 2018-03-23

## 2018-03-23 DIAGNOSIS — I23.6 LEFT VENTRICULAR APICAL THROMBUS FOLLOWING MI (H): ICD-10-CM

## 2018-03-23 DIAGNOSIS — Z79.01 LONG-TERM (CURRENT) USE OF ANTICOAGULANTS: ICD-10-CM

## 2018-03-23 LAB — INR PPP: 1.18 (ref 0.86–1.14)

## 2018-03-23 PROCEDURE — 85610 PROTHROMBIN TIME: CPT | Performed by: INTERNAL MEDICINE

## 2018-03-23 PROCEDURE — 36415 COLL VENOUS BLD VENIPUNCTURE: CPT | Performed by: INTERNAL MEDICINE

## 2018-03-23 NOTE — PROGRESS NOTES
ANTICOAGULATION FOLLOW-UP CLINIC VISIT    Patient Name:  Liborio THAYER Underdahl  Date:  3/23/2018  Contact Type:  Telephone    SUBJECTIVE:     Patient Findings     Positives Initiation of therapy           OBJECTIVE    INR   Date Value Ref Range Status   03/23/2018 1.18 (H) 0.86 - 1.14 Final       ASSESSMENT / PLAN  INR assessment SUB    Recheck INR In: 3 DAYS    INR Location Clinic      Anticoagulation Summary as of 3/23/2018     INR goal 2.0-3.0   Today's INR 1.18!   Maintenance plan No maintenance plan   Full instructions 3/23: 10 mg; 3/24: 10 mg; 3/25: 10 mg; Otherwise No maintenance plan   Next INR check 3/26/2018   Priority INR   Target end date     Indications   Long-term (current) use of anticoagulants [Z79.01] [Z79.01]  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) [I21.29  I23.6]         Anticoagulation Episode Summary     INR check location     Preferred lab     Send INR reminders to [facility]    Comments HIPPA Form mailed 3/1/18  Uses [facility] +++3/1/18 Can STOP ASA once INR is therapeutic (>2.00)+++  Duration 6 Months (August 28, 2018)      Anticoagulation Care Providers     Provider Role Specialty Phone number    Scotty Dunaway MD Responsible Internal Medicine - Interventional Cardiology [phone]            See the Encounter Report to view Anticoagulation Flowsheet and Dosing Calendar (Go to Encounters tab in chart review, and find the Anticoagulation Therapy Visit)    Spoke with Liborio.  He states he has blue 4 mg warfarin tablets and has not missed any doses of warfarin.  Will increase warfarin dose and recheck INR in 3 days.      Zoila Mcleod RN

## 2018-03-23 NOTE — MR AVS SNAPSHOT
Liborio Medellin   3/23/2018   Anticoagulation Therapy Visit    Description:  57 year old male   Provider:  Zoila Mcleod, RN   Department:  [facility]           INR as of 3/23/2018     Today's INR 1.18!      Anticoagulation Summary as of 3/23/2018     INR goal 2.0-3.0   Today's INR 1.18!   Full instructions 3/23: 10 mg; 3/24: 10 mg; 3/25: 10 mg; Otherwise No maintenance plan   Next INR check 3/26/2018    Indications   Long-term (current) use of anticoagulants [Z79.01] [Z79.01]  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) [I21.29  I23.6]         March 2018 Details    Sun Mon Tue Wed Thu Fri Sat         1               2               3                 4               5               6               7               8               9               10                 11               12               13               14               15               16               17                 18               19               20               21               22               23      10 mg   See details      24      10 mg           25      10 mg         26            27               28               29               30               31                Date Details   03/23 This INR check       Date of next INR:  3/26/2018         How to take your warfarin dose     To take:  10 mg Take 2.5 of the 4 mg tablets.

## 2018-03-26 DIAGNOSIS — I23.6 LEFT VENTRICULAR APICAL THROMBUS FOLLOWING MI (H): ICD-10-CM

## 2018-03-26 DIAGNOSIS — Z79.01 LONG-TERM (CURRENT) USE OF ANTICOAGULANTS: ICD-10-CM

## 2018-03-26 LAB — INR PPP: 1.54 (ref 0.86–1.14)

## 2018-03-26 PROCEDURE — 85610 PROTHROMBIN TIME: CPT | Performed by: INTERNAL MEDICINE

## 2018-03-26 PROCEDURE — 36415 COLL VENOUS BLD VENIPUNCTURE: CPT | Performed by: INTERNAL MEDICINE

## 2018-03-27 ENCOUNTER — ANTICOAGULATION THERAPY VISIT (OUTPATIENT)
Dept: ANTICOAGULATION | Facility: CLINIC | Age: 58
End: 2018-03-27

## 2018-03-27 DIAGNOSIS — Z79.01 LONG-TERM (CURRENT) USE OF ANTICOAGULANTS: ICD-10-CM

## 2018-03-27 DIAGNOSIS — I23.6 LEFT VENTRICULAR APICAL THROMBUS FOLLOWING MI (H): ICD-10-CM

## 2018-03-27 NOTE — MR AVS SNAPSHOT
Liborio Medellin   3/27/2018   Anticoagulation Therapy Visit    Description:  57 year old male   Provider:  Ofelia Heredia [facility]   Department:  [facility]           INR as of 3/27/2018     Today's INR       Anticoagulation Summary as of 3/27/2018     INR goal 2.0-3.0   Today's INR    Full instructions 3/27: 10 mg; 3/28: 10 mg; Otherwise No maintenance plan   Next INR check 3/29/2018    Indications   Long-term (current) use of anticoagulants [Z79.01] [Z79.01]  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) [I21.29  I23.6]         March 2018 Details    Sun Mon Tue Wed Thu Fri Sat         1               2               3                 4               5               6               7               8               9               10                 11               12               13               14               15               16               17                 18               19               20               21               22               23               24                 25               26               27      10 mg   See details      28      10 mg         29            30               31                Date Details   03/27 This INR check       Date of next INR:  3/29/2018         How to take your warfarin dose     To take:  10 mg Take 2.5 of the 4 mg tablets.

## 2018-04-02 ENCOUNTER — ANTICOAGULATION THERAPY VISIT (OUTPATIENT)
Dept: ANTICOAGULATION | Facility: CLINIC | Age: 58
End: 2018-04-02

## 2018-04-02 DIAGNOSIS — I23.6 LEFT VENTRICULAR APICAL THROMBUS FOLLOWING MI (H): ICD-10-CM

## 2018-04-02 DIAGNOSIS — Z79.01 LONG-TERM (CURRENT) USE OF ANTICOAGULANTS: ICD-10-CM

## 2018-04-02 LAB — INR PPP: 2.29 (ref 0.86–1.14)

## 2018-04-02 PROCEDURE — 36415 COLL VENOUS BLD VENIPUNCTURE: CPT | Performed by: INTERNAL MEDICINE

## 2018-04-02 PROCEDURE — 85610 PROTHROMBIN TIME: CPT | Performed by: INTERNAL MEDICINE

## 2018-04-02 NOTE — MR AVS SNAPSHOT
Liborio JEOVANY Medellin   4/2/2018   Anticoagulation Therapy Visit    Description:  57 year old male   Provider:  Zoila Mcleod, RN   Department:  [facility]           INR as of 4/2/2018     Today's INR 2.29      Anticoagulation Summary as of 4/2/2018     INR goal 2.0-3.0   Today's INR 2.29   Full instructions 4/2: 10 mg; 4/3: 10 mg; 4/4: 10 mg; 4/5: 10 mg; Otherwise No maintenance plan   Next INR check 4/6/2018    Indications   Long-term (current) use of anticoagulants [Z79.01] [Z79.01]  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) [I21.29  I23.6]         April 2018 Details    Sun Mon Tue Wed Thu Fri Sat     1               2      10 mg   See details      3      10 mg         4      10 mg         5      10 mg         6            7                 8               9               10               11               12               13               14                 15               16               17               18               19               20               21                 22               23               24               25               26               27               28                 29               30                     Date Details   04/02 This INR check       Date of next INR:  4/6/2018         How to take your warfarin dose     To take:  10 mg Take 2.5 of the 4 mg tablets.

## 2018-04-02 NOTE — PROGRESS NOTES
ANTICOAGULATION FOLLOW-UP CLINIC VISIT    Patient Name:  Liborio THAYER Underdahl  Date:  4/2/2018  Contact Type:  Telephone    SUBJECTIVE:     Patient Findings     Comments Unsure of warfarin dosing Liborio took 3/29/18 - 4/1/18.  Asked that he call the [facility] so proper dosing can be documented and a safe recommendation made.  Recommended he take warfarin 10 mg today, if that is what he has taken the last several days.  On message, informed him he can stop taking ASA because his INR is therapeutic.           OBJECTIVE    INR   Date Value Ref Range Status   04/02/2018 2.29 (H) 0.86 - 1.14 Final       ASSESSMENT / PLAN  INR assessment THER    Recheck INR In: 4 DAYS    INR Location Clinic      Anticoagulation Summary as of 4/2/2018     INR goal 2.0-3.0   Today's INR 2.29   Maintenance plan No maintenance plan   Full instructions 4/2: 10 mg; 4/3: 10 mg; 4/4: 10 mg; 4/5: 10 mg; Otherwise No maintenance plan   Next INR check 4/6/2018   Priority INR   Target end date     Indications   Long-term (current) use of anticoagulants [Z79.01] [Z79.01]  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) [I21.29  I23.6]         Anticoagulation Episode Summary     INR check location     Preferred lab     Send INR reminders to [facility]    Comments HIPPA Form mailed 3/1/18  Uses [facility] +++3/1/18 Can STOP ASA once INR is therapeutic (>2.00)+++  Duration 6 Months (August 28, 2018)      Anticoagulation Care Providers     Provider Role Specialty Phone number    Scotty Dunaway MD [facility] Internal Medicine - Interventional Cardiology [phone]            See the Encounter Report to view Anticoagulation Flowsheet and Dosing Calendar (Go to Encounters tab in chart review, and find the Anticoagulation Therapy Visit)    Left message for patient with results and dosing recommendations. Asked patient to call back to report any missed doses, falls, signs and symptoms of bleeding or clotting, any changes in health, medication,  or diet. Asked patient to call back with any questions or concerns.   Unsure of warfarin dosing Liborio took 3/29/18 - 4/1/18.  Asked that he call the [facility] so proper dosing can be documented and a safe recommendation made.  Recommended he take warfarin 10 mg today, if that is what he has taken the last several days. Also informed him that he can stop taking ASA because his INR is therapeutic.    Zoila Mcleod RN     4/3/18:  Addendum:  Spoke with Liborio.  He reports he has been taking warfarin 8 mg daily for the last 5 days.  He has had no changes in health, diet, medications.  He will stop his ASA since his INR is therapeutic.  He will recheck INR 4/6/18.  Calendar updated with correct warfarin doses.  Zoila Mcleod RN

## 2018-04-09 ENCOUNTER — ANTICOAGULATION THERAPY VISIT (OUTPATIENT)
Dept: ANTICOAGULATION | Facility: CLINIC | Age: 58
End: 2018-04-09

## 2018-04-09 DIAGNOSIS — I23.6 LEFT VENTRICULAR APICAL THROMBUS FOLLOWING MI (H): ICD-10-CM

## 2018-04-09 DIAGNOSIS — Z79.01 LONG-TERM (CURRENT) USE OF ANTICOAGULANTS: ICD-10-CM

## 2018-04-09 LAB — INR PPP: 2.78 (ref 0.86–1.14)

## 2018-04-09 PROCEDURE — 36415 COLL VENOUS BLD VENIPUNCTURE: CPT | Performed by: INTERNAL MEDICINE

## 2018-04-09 PROCEDURE — 85610 PROTHROMBIN TIME: CPT | Performed by: INTERNAL MEDICINE

## 2018-04-09 RX ORDER — WARFARIN SODIUM 4 MG/1
TABLET ORAL
Qty: 60 TABLET | Refills: 1 | Status: SHIPPED | OUTPATIENT
Start: 2018-04-09 | End: 2018-06-27

## 2018-04-09 NOTE — PROGRESS NOTES
ANTICOAGULATION FOLLOW-UP CLINIC VISIT    Patient Name:  Liborio THAYER Underdahl  Date:  4/9/2018  Contact Type:  Telephone    SUBJECTIVE:     Patient Findings     Positives Med error    Comments Liborio has been taking 8 mg (2x4mg tablets) daily except last Friday he only took 1 tablet. He also mentioned he had troubling filling is warfarin last time due to refill too soon.            OBJECTIVE    INR   Date Value Ref Range Status   04/09/2018 2.78 (H) 0.86 - 1.14 Final       ASSESSMENT / PLAN  INR assessment THER    Recheck INR In: 1 WEEK    INR Location Clinic      Anticoagulation Summary as of 4/9/2018     INR goal 2.0-3.0   Today's INR 2.78   Maintenance plan No maintenance plan   Full instructions 4/9: 8 mg; 4/10: 8 mg; 4/11: 8 mg; 4/12: 8 mg; 4/13: 4 mg; 4/14: 8 mg; 4/15: 4 mg; Otherwise No maintenance plan   Next INR check 4/16/2018   Priority INR   Target end date     Indications   Long-term (current) use of anticoagulants [Z79.01] [Z79.01]  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) [I21.29  I23.6]         Anticoagulation Episode Summary     INR check location     Preferred lab     Send INR reminders to [facility]    Comments HIPPA Form mailed 3/1/18  Uses [facility] +++3/1/18 Can STOP ASA once INR is therapeutic (>2.00)+++  Duration 6 Months (August 28, 2018)      Anticoagulation Care Providers     Provider Role Specialty Phone number    Scotty Dunaway MD [facility] Internal Medicine - Interventional Cardiology [phone]            See the Encounter Report to view Anticoagulation Flowsheet and Dosing Calendar (Go to Encounters tab in chart review, and find the Anticoagulation Therapy Visit)    Spoke with patient. Gave them their lab results and new warfarin recommendation.  No changes in health, medication, or diet. No missed doses, no falls. No signs or symptoms of bleed or clotting. Will send new order to pharmacy for warfarin with new instructions.    Ofelia Heredia [facility]

## 2018-04-09 NOTE — MR AVS SNAPSHOT
Liborio THAYER Madhu   4/9/2018   Anticoagulation Therapy Visit    Description:  57 year old male   Provider:  Ofelia Heredia [facility]   Department:  [facility]           INR as of 4/9/2018     Today's INR 2.78      Anticoagulation Summary as of 4/9/2018     INR goal 2.0-3.0   Today's INR 2.78   Full instructions 4/9: 8 mg; 4/10: 8 mg; 4/11: 8 mg; 4/12: 8 mg; 4/13: 4 mg; 4/14: 8 mg; 4/15: 4 mg; Otherwise No maintenance plan   Next INR check 4/16/2018    Indications   Long-term (current) use of anticoagulants [Z79.01] [Z79.01]  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) [I21.29  I23.6]         April 2018 Details    Sun Mon Tue Wed Thu Fri Sat     1               2               3               4               5               6               7                 8               9      8 mg   See details      10      8 mg         11      8 mg         12      8 mg         13      4 mg         14      8 mg           15      4 mg         16            17               18               19               20               21                 22               23               24               25               26               27               28                 29               30                     Date Details   04/09 This INR check       Date of next INR:  4/16/2018         How to take your warfarin dose     To take:  4 mg Take 1 of the 4 mg tablets.    To take:  8 mg Take 2 of the 4 mg tablets.

## 2018-04-11 NOTE — ADDENDUM NOTE
Encounter addended by: Kadie Cortes on: 4/11/2018 10:41 AM<BR>     Actions taken: Sign clinical note

## 2018-04-11 NOTE — PROGRESS NOTES
Cardiac Rehab Discharge Summary    Reason for discharge:    Insurance issues. Pt had been trying to resolve insurance issues with no apparent success.    Progress towards goals:  Unable to assess at time of closing out chart    Recommendation(s):    Continue home exercise program.

## 2018-04-17 ENCOUNTER — ANTICOAGULATION THERAPY VISIT (OUTPATIENT)
Dept: ANTICOAGULATION | Facility: CLINIC | Age: 58
End: 2018-04-17

## 2018-04-17 DIAGNOSIS — I23.6 LEFT VENTRICULAR APICAL THROMBUS FOLLOWING MI (H): ICD-10-CM

## 2018-04-17 DIAGNOSIS — Z79.01 LONG-TERM (CURRENT) USE OF ANTICOAGULANTS: ICD-10-CM

## 2018-04-17 LAB — INR PPP: 2.51 (ref 0.86–1.14)

## 2018-04-17 PROCEDURE — 36415 COLL VENOUS BLD VENIPUNCTURE: CPT | Performed by: INTERNAL MEDICINE

## 2018-04-17 PROCEDURE — 85610 PROTHROMBIN TIME: CPT | Performed by: INTERNAL MEDICINE

## 2018-04-17 NOTE — PROGRESS NOTES
ANTICOAGULATION FOLLOW-UP CLINIC VISIT    Patient Name:  Liborio Dupreedahl  Date:  4/17/2018  Contact Type:  Telephone    SUBJECTIVE:        OBJECTIVE    INR   Date Value Ref Range Status   04/17/2018 2.51 (H) 0.86 - 1.14 Final       ASSESSMENT / PLAN  INR assessment THER    Recheck INR In: 10 DAYS    INR Location Clinic      Anticoagulation Summary as of 4/17/2018     INR goal 2.0-3.0   Today's INR 2.51   Maintenance plan No maintenance plan   Full instructions 4/17: 8 mg; 4/18: 8 mg; 4/19: 8 mg; 4/20: 4 mg; 4/21: 8 mg; 4/22: 4 mg; 4/23: 8 mg; 4/24: 8 mg; 4/25: 8 mg; 4/26: 8 mg; Otherwise No maintenance plan   Next INR check 4/27/2018   Priority INR   Target end date     Indications   Long-term (current) use of anticoagulants [Z79.01] [Z79.01]  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) [I21.29  I23.6]         Anticoagulation Episode Summary     INR check location     Preferred lab     Send INR reminders to [facility]    Comments HIPPA Form mailed 3/1/18  Uses [facility] +++3/1/18 Can STOP ASA once INR is therapeutic (>2.00)+++  Duration 6 Months (August 28, 2018)      Anticoagulation Care Providers     Provider Role Specialty Phone number    Scotty Dunaway MD Responsible Internal Medicine - Interventional Cardiology [phone]            See the Encounter Report to view Anticoagulation Flowsheet and Dosing Calendar (Go to Encounters tab in chart review, and find the Anticoagulation Therapy Visit)  Left message for patient with results and dosing recommendations. Asked patient to call back to report any missed doses, falls, signs and symptoms of bleeding or clotting, any changes in health, medication, or diet. Asked patient to call back with any questions or concerns.      Layne Cruz RN

## 2018-04-17 NOTE — MR AVS SNAPSHOT
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) [I21.29  I23.6]         April 2018 Details    Sun Mon Tue Wed Thu Fri Sat     1               2               3               4               5               6               7                 8               9               10               11               12               13               14                 15               16               17      8 mg   See details      18      8 mg         19      8 mg         20      4 mg         21      8 mg           22      4 mg         23      8 mg         24      8 mg         25      8 mg         26      8 mg         27            28                 29               30                     Date Details   04/17 This INR check       Date of next INR:  4/27/2018         How to take your warfarin dose     To take:  4 mg Take 1 of the 4 mg tablets.    To take:  8 mg Take 2 of the 4 mg tablets.

## 2018-04-23 ENCOUNTER — ANTICOAGULATION THERAPY VISIT (OUTPATIENT)
Dept: ANTICOAGULATION | Facility: CLINIC | Age: 58
End: 2018-04-23

## 2018-04-23 DIAGNOSIS — Z79.01 LONG-TERM (CURRENT) USE OF ANTICOAGULANTS: ICD-10-CM

## 2018-04-23 DIAGNOSIS — I23.6 LEFT VENTRICULAR APICAL THROMBUS FOLLOWING MI (H): ICD-10-CM

## 2018-04-23 LAB — INR PPP: 2.29 (ref 0.86–1.14)

## 2018-04-23 PROCEDURE — 36415 COLL VENOUS BLD VENIPUNCTURE: CPT | Performed by: INTERNAL MEDICINE

## 2018-04-23 PROCEDURE — 85610 PROTHROMBIN TIME: CPT | Performed by: INTERNAL MEDICINE

## 2018-04-23 NOTE — MR AVS SNAPSHOT
Liborio THAYER Vignseh   4/23/2018   Anticoagulation Therapy Visit    Description:  57 year old male   Provider:  Zoila Mcleod RN   Department:  [facility]           INR as of 4/23/2018     Today's INR 2.29      Anticoagulation Summary as of 4/23/2018     INR goal 2.0-3.0   Today's INR 2.29   Full instructions 4/23: 8 mg; 4/24: 8 mg; 4/25: 8 mg; 4/26: 8 mg; Otherwise 4 mg on Sun, Fri; 8 mg all other days   Next INR check     Indications   Long-term (current) use of anticoagulants [Z79.01] [Z79.01]  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) [I21.29  I23.6]         April 2018 Details    Sun Mon Tue Wed Thu Fri Sat     1               2               3               4               5               6               7                 8               9               10               11               12               13               14                 15               16               17               18               19               20               21                 22               23      8 mg   See details      24      8 mg         25      8 mg         26      8 mg         27      4 mg         28      8 mg           29      4 mg         30      8 mg               Date Details   04/23 This INR check      Date of next INR: No date specified         How to take your warfarin dose     To take:  4 mg Take 1 of the 4 mg tablets.    To take:  8 mg Take 2 of the 4 mg tablets.

## 2018-04-23 NOTE — PROGRESS NOTES
ANTICOAGULATION FOLLOW-UP CLINIC VISIT    Patient Name:  Liborio Leunghl  Date:  4/23/2018  Contact Type:  Telephone    SUBJECTIVE:        OBJECTIVE    INR   Date Value Ref Range Status   04/23/2018 2.29 (H) 0.86 - 1.14 Final       ASSESSMENT / PLAN  INR assessment THER    Recheck INR In: 2 WEEKS    INR Location Clinic      Anticoagulation Summary as of 4/23/2018     INR goal 2.0-3.0   Today's INR 2.29   Maintenance plan 4 mg (4 mg x 1) on Sun, Fri; 8 mg (4 mg x 2) all other days   Full instructions 4/23: 8 mg; 4/24: 8 mg; 4/25: 8 mg; 4/26: 8 mg; Otherwise 4 mg on Sun, Fri; 8 mg all other days   Weekly total 48 mg   Plan last modified Zoila Mcleod RN (4/23/2018)   Next INR check    Priority INR   Target end date     Indications   Long-term (current) use of anticoagulants [Z79.01] [Z79.01]  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) [I21.29  I23.6]         Anticoagulation Episode Summary     INR check location     Preferred lab     Send INR reminders to [facility]    Comments HIPPA Form mailed 3/1/18  Uses [facility] +++3/1/18 Can STOP ASA once INR is therapeutic (>2.00)+++  Duration 6 Months (August 28, 2018)      Anticoagulation Care Providers     Provider Role Specialty Phone number    Scotty Dunaway MD [facility] Internal Medicine - Interventional Cardiology [phone]            See the Encounter Report to view Anticoagulation Flowsheet and Dosing Calendar (Go to Encounters tab in chart review, and find the Anticoagulation Therapy Visit)    Left message for patient with results and dosing recommendations. Asked patient to call back to report any missed doses, falls, signs and symptoms of bleeding or clotting, any changes in health, medication, or diet. Asked patient to call back with any questions or concerns.     Zoila Mcleod, RN

## 2018-05-07 ENCOUNTER — ANTICOAGULATION THERAPY VISIT (OUTPATIENT)
Dept: ANTICOAGULATION | Facility: CLINIC | Age: 58
End: 2018-05-07

## 2018-05-07 DIAGNOSIS — I23.6 LEFT VENTRICULAR APICAL THROMBUS FOLLOWING MI (H): ICD-10-CM

## 2018-05-07 DIAGNOSIS — Z79.01 LONG-TERM (CURRENT) USE OF ANTICOAGULANTS: ICD-10-CM

## 2018-05-07 LAB — INR PPP: 1.82 (ref 0.86–1.14)

## 2018-05-07 PROCEDURE — 85610 PROTHROMBIN TIME: CPT | Performed by: INTERNAL MEDICINE

## 2018-05-07 PROCEDURE — 36415 COLL VENOUS BLD VENIPUNCTURE: CPT | Performed by: INTERNAL MEDICINE

## 2018-05-07 NOTE — PROGRESS NOTES
ANTICOAGULATION FOLLOW-UP CLINIC VISIT    Patient Name:  Liborio THAYER Underdahl  Date:  5/7/2018  Contact Type:  Telephone    SUBJECTIVE:     Patient Findings     Positives Unexplained INR or factor level change (Subtherapeutic INR result is 1.8 today.)    Comments Left message for Liborio to phone the [facility] if he missed any doses of Coumadin. Made a one time adjustment to the maintenance dose to include only one dose of 4mg. Will check in one week.           OBJECTIVE    INR   Date Value Ref Range Status   05/07/2018 1.82 (H) 0.86 - 1.14 Final       ASSESSMENT / PLAN  INR assessment SUB    Recheck INR In: 1 WEEK    INR Location Clinic      Anticoagulation Summary as of 5/7/2018     INR goal 2.0-3.0   Today's INR 1.82!   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) [I21.29  I23.6]         Anticoagulation Episode Summary     INR check location     Preferred lab     Send INR reminders to [facility]    Comments HIPPA Form mailed 3/1/18  Uses [facility] +++3/1/18 Can STOP ASA once INR is therapeutic (>2.00)+++  Duration 6 Months (August 28, 2018)      Anticoagulation Care Providers     Provider Role Specialty Phone number    Scotty Dunaway MD Responsible Internal Medicine - Interventional Cardiology [phone]            See the Encounter Report to view Anticoagulation Flowsheet and Dosing Calendar (Go to Encounters tab in chart review, and find the Anticoagulation Therapy Visit)    Left message for patient with results and dosing recommendations. Asked patient to call back to report any missed doses, falls, signs and symptoms of  bleeding or clotting, any changes in health, medication, or diet. Asked patient to call back with any questions or concerns.  Adjusted Sunday dose to 8mg.  Will see if the maintenance dose needs to be altered.    Inocente Teran, RN

## 2018-05-07 NOTE — MR AVS SNAPSHOT
Liborio Medellin   5/7/2018   Anticoagulation Therapy Visit    Description:  57 year old male   Provider:  Inocente Teran   Department:  [facility]           INR as of 5/7/2018     Today's INR 1.82!      Anticoagulation Summary as of 5/7/2018     INR goal 2.0-3.0   Today's INR 1.82!   Full instructions 5/13: 8 mg; Otherwise 4 mg on Sun, Fri; 8 mg all other days   Next INR check 5/14/2018    Indications   Long-term (current) use of anticoagulants [Z79.01] [Z79.01]  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) [I21.29  I23.6]         May 2018 Details    Sun Mon Tue Wed Thu Fri Sat       1               2               3               4               5                 6               7      8 mg   See details      8      8 mg         9      8 mg         10      8 mg         11      4 mg         12      8 mg           13      8 mg         14            15               16               17               18               19                 20               21               22               23               24               25               26                 27               28               29               30               31                  Date Details   05/07 This INR check       Date of next INR:  5/14/2018         How to take your warfarin dose     To take:  4 mg Take 1 of the 4 mg tablets.    To take:  8 mg Take 2 of the 4 mg tablets.

## 2018-05-24 ENCOUNTER — TELEPHONE (OUTPATIENT)
Dept: CARDIOLOGY | Facility: CLINIC | Age: 58
End: 2018-05-24

## 2018-05-24 NOTE — TELEPHONE ENCOUNTER
Patient needs a follow up cardiology appointment, left message to make appt here with us or in [place].  Left message with sister about patient needing ECHO

## 2018-05-29 DIAGNOSIS — I23.6 LEFT VENTRICULAR APICAL THROMBUS FOLLOWING MI (H): ICD-10-CM

## 2018-05-29 DIAGNOSIS — Z79.01 LONG-TERM (CURRENT) USE OF ANTICOAGULANTS: ICD-10-CM

## 2018-05-29 LAB — INR PPP: 1.77 (ref 0.86–1.14)

## 2018-05-29 PROCEDURE — 85610 PROTHROMBIN TIME: CPT | Performed by: INTERNAL MEDICINE

## 2018-05-29 PROCEDURE — 36415 COLL VENOUS BLD VENIPUNCTURE: CPT | Performed by: INTERNAL MEDICINE

## 2018-05-30 ENCOUNTER — ANTICOAGULATION THERAPY VISIT (OUTPATIENT)
Dept: ANTICOAGULATION | Facility: CLINIC | Age: 58
End: 2018-05-30

## 2018-05-30 DIAGNOSIS — Z79.01 LONG-TERM (CURRENT) USE OF ANTICOAGULANTS: ICD-10-CM

## 2018-05-30 DIAGNOSIS — I23.6 LEFT VENTRICULAR APICAL THROMBUS FOLLOWING MI (H): ICD-10-CM

## 2018-05-30 NOTE — MR AVS SNAPSHOT
Liborio THAYER Vignesh   5/30/2018   Anticoagulation Therapy Visit    Description:  57 year old male   Provider:  Zoila Mcleod, RN   Department:  [facility]           INR as of 5/30/2018     Today's INR 1.77! (5/29/2018)      Anticoagulation Summary as of 5/30/2018     INR goal 2.0-3.0   Today's INR 1.77! (5/29/2018)   Full warfarin instructions 6/3: 8 mg; Otherwise 4 mg on Sun, Fri; 8 mg all other days   Next INR check 6/8/2018    Indications   Long-term (current) use of anticoagulants [Z79.01] [Z79.01]  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) [I21.29  I23.6]         May 2018 Details    Sun Mon Tue Wed Thu Fri Sat       1               2               3               4               5                 6               7               8               9               10               11               12                 13               14               15               16               17               18               19                 20               21               22               23               24               25               26                 27               28               29               30      8 mg   See details      31      8 mg            Date Details   05/30 This INR check               How to take your warfarin dose     To take:  8 mg Take 2 of the 4 mg tablets.           June 2018 Details    Sun Mon Tue Wed Thu Fri Sat          1      4 mg         2      8 mg           3      8 mg         4      8 mg         5      8 mg         6      8 mg         7      8 mg         8            9                 10               11               12               13               14               15               16                 17               18               19               20               21               22               23                 24               25               26               27               28               29               30                Date Details   No  additional details    Date of next INR:  6/8/2018         How to take your warfarin dose     To take:  4 mg Take 1 of the 4 mg tablets.    To take:  8 mg Take 2 of the 4 mg tablets.

## 2018-05-30 NOTE — PROGRESS NOTES
ANTICOAGULATION FOLLOW-UP CLINIC VISIT    Patient Name:  Liborio THAYER Underdahl  Date:  5/30/2018  Contact Type:  Telephone    SUBJECTIVE:     Patient Findings     Comments Liborio's mailbox is full.  Left message on his sisterJosephine's phone asking her to ask Liborio to call the [facility].  Calling to find out if he has had any changes in health, diet, medications.  Also, what dose of warfarin he has been taking.           OBJECTIVE    INR   Date Value Ref Range Status   05/29/2018 1.77 (H) 0.86 - 1.14 Final       ASSESSMENT / PLAN  INR assessment SUB    Recheck INR In: 10 DAYS    INR Location Clinic      Anticoagulation Summary as of 5/30/2018     INR goal 2.0-3.0   Today's INR 1.77! (5/29/2018)   Warfarin maintenance plan 4 mg (4 mg x 1) on Sun, Fri; 8 mg (4 mg x 2) all other days   Full warfarin instructions 6/3: 8 mg; Otherwise 4 mg on Sun, Fri; 8 mg all other days   Weekly warfarin total 48 mg   Plan last modified Zoila Mcleod RN (4/23/2018)   Next INR check 6/8/2018   Priority INR   Target end date     Indications   Long-term (current) use of anticoagulants [Z79.01] [Z79.01]  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) [I21.29  I23.6]         Anticoagulation Episode Summary     INR check location     Preferred lab     Send INR reminders to [facility]    Comments HIPPA Form mailed 3/1/18  Uses [facility] +++3/1/18 Can STOP ASA once INR is therapeutic (>2.00)+++  Duration 6 Months (August 28, 2018)      Anticoagulation Care Providers     Provider Role Specialty Phone number    Scotty Dunaway MD [facility] Internal Medicine - Interventional Cardiology [phone]            See the Encounter Report to view Anticoagulation Flowsheet and Dosing Calendar (Go to Encounters tab in chart review, and find the Anticoagulation Therapy Visit)    Attempted to call Liborio--mailbox is full.  Left a message on his sister, Shane, phone asking her to have Liborio call the [facility].      Zoila Mcleod, RN         5/30/18:  Liborio called back.  He missed warfarin dose 5/26/18.  Will increase today's dose then go back to his maintenance dose.  Recheck INR in 2 weeks.  RX for warfarin sent to pharmacy.  Zoila Mcleod RN

## 2018-06-01 ENCOUNTER — HOSPITAL ENCOUNTER (OUTPATIENT)
Dept: CARDIOLOGY | Facility: CLINIC | Age: 58
Discharge: HOME | End: 2018-06-01
Attending: INTERNAL MEDICINE | Admitting: INTERNAL MEDICINE
Payer: COMMERCIAL

## 2018-06-01 DIAGNOSIS — I23.6 LV (LEFT VENTRICULAR) MURAL THROMBUS FOLLOWING MI (H): ICD-10-CM

## 2018-06-01 PROCEDURE — 40000264 ECHO COMPLETE WITH OPTISON

## 2018-06-01 PROCEDURE — 93306 TTE W/DOPPLER COMPLETE: CPT | Mod: 26 | Performed by: INTERNAL MEDICINE

## 2018-06-01 PROCEDURE — 25500064 ZZH RX 255 OP 636: Performed by: INTERNAL MEDICINE

## 2018-06-01 RX ADMIN — HUMAN ALBUMIN MICROSPHERES AND PERFLUTREN 2 ML: 10; .22 INJECTION, SOLUTION INTRAVENOUS at 15:30

## 2018-06-20 ENCOUNTER — ANTICOAGULATION THERAPY VISIT (OUTPATIENT)
Dept: ANTICOAGULATION | Facility: CLINIC | Age: 58
End: 2018-06-20

## 2018-06-20 DIAGNOSIS — Z79.01 LONG-TERM (CURRENT) USE OF ANTICOAGULANTS: ICD-10-CM

## 2018-06-20 DIAGNOSIS — I23.6 LEFT VENTRICULAR APICAL THROMBUS FOLLOWING MI (H): ICD-10-CM

## 2018-06-20 LAB — INR PPP: 2.64 (ref 0.86–1.14)

## 2018-06-20 PROCEDURE — 36415 COLL VENOUS BLD VENIPUNCTURE: CPT | Performed by: INTERNAL MEDICINE

## 2018-06-20 PROCEDURE — 85610 PROTHROMBIN TIME: CPT | Performed by: INTERNAL MEDICINE

## 2018-06-20 NOTE — MR AVS SNAPSHOT
Liborio THAYER Vignesh   6/20/2018   Anticoagulation Therapy Visit    Description:  57 year old male   Provider:  Megan Maurer, RN   Department:  [facility]           INR as of 6/20/2018     Today's INR 2.64      Anticoagulation Summary as of 6/20/2018     INR goal 2.0-3.0   Today's INR 2.64   Full warfarin instructions 4 mg on Sun, Fri; 8 mg all other days   Next INR check 7/11/2018    Indications   Long-term (current) use of anticoagulants [Z79.01] [Z79.01]  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) [I21.29  I23.6]         June 2018 Details    Sun Mon Tue Wed Thu Fri Sat          1               2                 3               4               5               6               7               8               9                 10               11               12               13               14               15               16                 17               18               19               20      8 mg   See details      21      8 mg         22      4 mg         23      8 mg           24      4 mg         25      8 mg         26      8 mg         27      8 mg         28      8 mg         29      4 mg         30      8 mg          Date Details   06/20 This INR check               How to take your warfarin dose     To take:  4 mg Take 1 of the 4 mg tablets.    To take:  8 mg Take 2 of the 4 mg tablets.           July 2018 Details    Sun Mon Tue Wed Thu Fri Sat     1      4 mg         2      8 mg         3      8 mg         4      8 mg         5      8 mg         6      4 mg         7      8 mg           8      4 mg         9      8 mg         10      8 mg         11            12               13               14                 15               16               17               18               19               20               21                 22               23               24               25               26               27               28                 29               30                31                    Date Details   No additional details    Date of next INR:  7/11/2018         How to take your warfarin dose     To take:  4 mg Take 1 of the 4 mg tablets.    To take:  8 mg Take 2 of the 4 mg tablets.

## 2018-06-20 NOTE — PROGRESS NOTES
ANTICOAGULATION FOLLOW-UP CLINIC VISIT    Patient Name:  Liborio Leunghl  Date:  6/20/2018  Contact Type:  Telephone    SUBJECTIVE:        OBJECTIVE    INR   Date Value Ref Range Status   06/20/2018 2.64 (H) 0.86 - 1.14 Final       ASSESSMENT / PLAN  No question data found.  Anticoagulation Summary as of 6/20/2018     INR goal 2.0-3.0   Today's INR 2.64   Warfarin maintenance plan 4 mg (4 mg x 1) on Sun, Fri; 8 mg (4 mg x 2) all other days   Full warfarin instructions 4 mg on Sun, Fri; 8 mg all other days   Weekly warfarin total 48 mg   No change documented Megan Maurer RN   Plan last modified Zoila Mcleod RN (4/23/2018)   Next INR check 7/11/2018   Priority INR   Target end date     Indications   Long-term (current) use of anticoagulants [Z79.01] [Z79.01]  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) [I21.29  I23.6]         Anticoagulation Episode Summary     INR check location     Preferred lab     Send INR reminders to [facility]    Comments HIPPA Form mailed 3/1/18  Uses [facility] +++3/1/18 Can STOP ASA once INR is therapeutic (>2.00)+++  Duration 6 Months (August 28, 2018)      Anticoagulation Care Providers     Provider Role Specialty Phone number    Scotty Dunaway MD [facility] Internal Medicine - Interventional Cardiology [phone]            See the Encounter Report to view Anticoagulation Flowsheet and Dosing Calendar (Go to Encounters tab in chart review, and find the Anticoagulation Therapy Visit)    Left message for patient with results and dosing recommendations. Asked patient to call back to report any missed doses, falls, signs and symptoms of bleeding or clotting, any changes in health, medication, or diet. Asked patient to call back with any questions or concerns.     Megan Maurer RN

## 2018-06-27 ENCOUNTER — ANTICOAGULATION THERAPY VISIT (OUTPATIENT)
Dept: ANTICOAGULATION | Facility: CLINIC | Age: 58
End: 2018-06-27

## 2018-06-27 ENCOUNTER — CARE COORDINATION (OUTPATIENT)
Dept: CARDIOLOGY | Facility: CLINIC | Age: 58
End: 2018-06-27

## 2018-06-27 DIAGNOSIS — I23.6 LEFT VENTRICULAR APICAL THROMBUS FOLLOWING MI (H): ICD-10-CM

## 2018-06-27 DIAGNOSIS — Z79.01 LONG-TERM (CURRENT) USE OF ANTICOAGULANTS: ICD-10-CM

## 2018-06-27 NOTE — LETTER
June 27, 2018      TO: Liborio Medellin  [address]  [facility] [address]         Dear Liborio,    You can stop taking your Coumadin and stop getting INR's. Your thrombus (clot) in your heart is gone. Make an appointment to see a cardiologist this year.    Please do not hesitate to call me if you have any questions or concerns.    Sincerely,      Scotty Dunaway MD

## 2018-06-27 NOTE — PROGRESS NOTES
Left message for Liborio to stop taking Coumadin and stop having INR draws. His therapy is complete as his thrombus has resolved. Called [place] [facility] and asked them not to draw anymore INR's, discontinued the order.   Called pharmacy and discontinued the warfarin.

## 2018-06-27 NOTE — MR AVS SNAPSHOT
Liborio JEOVANY Medellin   6/27/2018   Anticoagulation Therapy Visit    Description:  57 year old male   Provider:  Nichelle Langston, RN   Department:  [facility]           INR as of 6/27/2018     Today's INR       Anticoagulation Summary as of 6/27/2018     INR goal 2.0-3.0   Today's INR    Full warfarin instructions 4 mg on Sun, Fri; 8 mg all other days   Next INR check     Indications   Long-term (current) use of anticoagulants [Z79.01] [Z79.01]  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) [I21.29  I23.6]         Anticoagulation Episode Summary     Resolved date 6/27/2018    Resolved reason Therapy  Complete

## 2018-06-27 NOTE — PROGRESS NOTES
Received below message and we are inactivating pt from our services.     We are discontinuing the warfarin, his clot has resolved.   Neetu

## 2018-08-09 DIAGNOSIS — I25.119 CORONARY ARTERY DISEASE INVOLVING NATIVE HEART WITH ANGINA PECTORIS, UNSPECIFIED VESSEL OR LESION TYPE (H): ICD-10-CM

## 2018-08-10 RX ORDER — CLOPIDOGREL BISULFATE 75 MG/1
TABLET ORAL
Qty: 90 TABLET | Refills: 0 | Status: SHIPPED | OUTPATIENT
Start: 2018-08-10 | End: 2018-08-14

## 2018-08-10 NOTE — TELEPHONE ENCOUNTER
"Requested Prescriptions   Pending Prescriptions Disp Refills     clopidogrel (PLAVIX) 75 MG tablet [Pharmacy Med Name: CLOPIDOGREL BISULFATE 75MG TABS] 90 tablet 1     Sig: TAKE 4 TABLETS BY MOUTH ON DAY ONE THEN TAKE ONE TABLET BY MOUTH EVERY DAY THEREAFTER    Plavix Failed    8/9/2018  8:33 PM       Failed - Normal HGB on file in past 12 months    Recent Labs   Lab Test  11/17/17   0656   HGB  12.9*              Passed - No active PPI on record unless is Protonix       Passed - Normal Platelets on file in past 12 months    Recent Labs   Lab Test  11/17/17   0656   PLT  261              Passed - Recent (12 mo) or future (30 days) visit within the authorizing provider's specialty    Patient had office visit in the last 12 months or has a visit in the next 30 days with authorizing provider or within the authorizing provider's specialty.  See \"Patient Info\" tab in inbasket, or \"Choose Columns\" in Meds & Orders section of the refill encounter.           Passed - Patient is age 18 or older        Last Written Prescription Date:  1/29/18  Last Fill Quantity: 90,  # refills: 1   Last office visit: 11/21/2017 with prescribing provider:  GISSELLE   Future Office Visit:      "

## 2018-08-10 NOTE — TELEPHONE ENCOUNTER
Routing refill request to provider for review/approval because:  Labs out of range:  See below    Pricilla Rojas RN

## 2018-08-13 NOTE — TELEPHONE ENCOUNTER
Left message on answering machine for patient to call back.   CSS, please advise him when he calls that Dr Gagnon refilled his clopidrogel/ plavix, and he needs to see cardiology before additional refills. Thanks!   Germania Magdaleno RNC

## 2018-08-13 NOTE — TELEPHONE ENCOUNTER
Message left for patient to check the pharmacy.  For 3 months and then he is due to f/u with cardiology. Aicha CRAFT RN

## 2018-08-14 RX ORDER — CLOPIDOGREL BISULFATE 75 MG/1
75 TABLET ORAL DAILY
Qty: 90 TABLET | Refills: 1 | Status: SHIPPED | OUTPATIENT
Start: 2018-08-14 | End: 2019-06-03

## 2018-12-23 DIAGNOSIS — I21.02 ST ELEVATION MYOCARDIAL INFARCTION INVOLVING LEFT ANTERIOR DESCENDING (LAD) CORONARY ARTERY (H): ICD-10-CM

## 2018-12-24 NOTE — TELEPHONE ENCOUNTER
"Requested Prescriptions   Pending Prescriptions Disp Refills     metoprolol succinate ER (TOPROL-XL) 25 MG 24 hr tablet [Pharmacy Med Name: METOPROLOL SUCCINATE ER 25MG TB24]  Last Written Prescription Date:  11/21/17  Last Fill Quantity: 135,  # refills: 3   Last office visit: 11/21/2017 with prescribing provider:  Alexus   Future Office Visit:     135 tablet 3     Sig: TAKE 1/2 TABLET BY MOUTH DAILY    Beta-Blockers Protocol Failed - 12/23/2018  2:29 PM       Failed - Blood pressure under 140/90 in past 12 months    BP Readings from Last 3 Encounters:   11/25/17 124/81   11/21/17 123/68   11/17/17 115/61                Failed - Recent (12 mo) or future (30 days) visit within the authorizing provider's specialty    Patient had office visit in the last 12 months or has a visit in the next 30 days with authorizing provider or within the authorizing provider's specialty.  See \"Patient Info\" tab in inbasket, or \"Choose Columns\" in Meds & Orders section of the refill encounter.             Passed - Patient is age 6 or older        lisinopril (PRINIVIL/ZESTRIL) 30 MG tablet [Pharmacy Med Name: LISINOPRIL 30MG TABS]  Last Written Prescription Date:  11/21/17  Last Fill Quantity: 90,  # refills: 3   Last office visit: 11/21/2017 with prescribing provider:  Alexus   Future Office Visit:     90 tablet 3     Sig: TAKE ONE TABLET BY MOUTH EVERY DAY    ACE Inhibitors (Including Combos) Protocol Failed - 12/23/2018  2:29 PM       Failed - Blood pressure under 140/90 in past 12 months    BP Readings from Last 3 Encounters:   11/25/17 124/81   11/21/17 123/68   11/17/17 115/61                Failed - Recent (12 mo) or future (30 days) visit within the authorizing provider's specialty    Patient had office visit in the last 12 months or has a visit in the next 30 days with authorizing provider or within the authorizing provider's specialty.  See \"Patient Info\" tab in inbasket, or \"Choose Columns\" in Meds & Orders section of the " "refill encounter.             Failed - Normal serum creatinine on file in past 12 months    Recent Labs   Lab Test 11/17/17  0656   CR 1.01            Failed - Normal serum potassium on file in past 12 months    Recent Labs   Lab Test 11/17/17  0656   POTASSIUM 4.1            Passed - Patient is age 18 or older        atorvastatin (LIPITOR) 40 MG tablet [Pharmacy Med Name: ATORVASTATIN CALCIUM 40MG TABS]  Last Written Prescription Date:  11/21/17  Last Fill Quantity: 90,  # refills: 3   Last office visit: 11/21/2017 with prescribing provider:  Alexus   Future Office Visit:     90 tablet 3     Sig: TAKE ONE TABLET BY MOUTH EVERY DAY    Statins Protocol Failed - 12/23/2018  2:29 PM       Failed - LDL on file in past 12 months    Recent Labs   Lab Test 11/22/17  1027   LDL 51            Failed - Recent (12 mo) or future (30 days) visit within the authorizing provider's specialty    Patient had office visit in the last 12 months or has a visit in the next 30 days with authorizing provider or within the authorizing provider's specialty.  See \"Patient Info\" tab in inbasket, or \"Choose Columns\" in Meds & Orders section of the refill encounter.             Passed - No abnormal creatine kinase in past 12 months    No lab results found.            Passed - Patient is age 18 or older          "

## 2018-12-26 RX ORDER — LISINOPRIL 30 MG/1
TABLET ORAL
Qty: 90 TABLET | Refills: 0 | Status: SHIPPED | OUTPATIENT
Start: 2018-12-26 | End: 2019-04-15

## 2018-12-26 RX ORDER — METOPROLOL SUCCINATE 25 MG/1
TABLET, EXTENDED RELEASE ORAL
Qty: 135 TABLET | Refills: 0 | Status: SHIPPED | OUTPATIENT
Start: 2018-12-26 | End: 2019-06-03

## 2018-12-26 RX ORDER — ATORVASTATIN CALCIUM 40 MG/1
TABLET, FILM COATED ORAL
Qty: 90 TABLET | Refills: 0 | Status: SHIPPED | OUTPATIENT
Start: 2018-12-26 | End: 2019-04-15

## 2019-04-15 DIAGNOSIS — I21.02 ST ELEVATION MYOCARDIAL INFARCTION INVOLVING LEFT ANTERIOR DESCENDING (LAD) CORONARY ARTERY (H): ICD-10-CM

## 2019-04-16 NOTE — TELEPHONE ENCOUNTER
"Requested Prescriptions   Pending Prescriptions Disp Refills     atorvastatin (LIPITOR) 40 MG tablet [Pharmacy Med Name: ATORVASTATIN CALCIUM 40MG TABS] 90 tablet 0     Sig: TAKE ONE TABLET BY MOUTH EVERY DAY   Last Written Prescription Date:  12/26/18  Last Fill Quantity: 90 tab,  # refills: 0   Last office visit: No previous visit found with prescribing provider:  11/21/17 [facility]   Future Office Visit:        Statins Protocol Failed - 4/15/2019  7:05 PM        Failed - LDL on file in past 12 months     Recent Labs   Lab Test 11/22/17  1027   LDL 51             Failed - Recent (12 mo) or future (30 days) visit within the authorizing provider's specialty     Patient had office visit in the last 12 months or has a visit in the next 30 days with authorizing provider or within the authorizing provider's specialty.  See \"Patient Info\" tab in inbasket, or \"Choose Columns\" in Meds & Orders section of the refill encounter.              Passed - No abnormal creatine kinase in past 12 months     No lab results found.             Passed - Medication is active on med list        Passed - Patient is age 18 or older        lisinopril (PRINIVIL/ZESTRIL) 30 MG tablet [Pharmacy Med Name: LISINOPRIL 30MG TABS] 90 tablet 0     Sig: TAKE ONE TABLET BY MOUTH EVERY DAY   Last Written Prescription Date:  12/26/18  Last Fill Quantity: 90 tab,  # refills: 0   Last office visit: previous visit found with provider:  11/21/17 [facility]   Future Office Visit:        ACE Inhibitors (Including Combos) Protocol Failed - 4/15/2019  7:05 PM        Failed - Blood pressure under 140/90 in past 12 months     BP Readings from Last 3 Encounters:   11/25/17 124/81   11/21/17 123/68   11/17/17 115/61                 Failed - Recent (12 mo) or future (30 days) visit within the authorizing provider's specialty     Patient had office visit in the last 12 months or has a visit in the next 30 days with authorizing provider or within the authorizing provider's " "specialty.  See \"Patient Info\" tab in inbasket, or \"Choose Columns\" in Meds & Orders section of the refill encounter.              Failed - Normal serum creatinine on file in past 12 months     Recent Labs   Lab Test 11/17/17  0656   CR 1.01             Failed - Normal serum potassium on file in past 12 months     Recent Labs   Lab Test 11/17/17  0656   POTASSIUM 4.1             Passed - Medication is active on med list        Passed - Patient is age 18 or older          "

## 2019-04-17 NOTE — TELEPHONE ENCOUNTER
Routing refill request to provider for review/approval because:  Angela given x1 of 90 day supply and patient did not follow up, please advise.    LOV 11/21/2017 Alexus.     DENY med?     Kim KELLER BSN, RN

## 2019-04-18 RX ORDER — LISINOPRIL 30 MG/1
TABLET ORAL
Qty: 30 TABLET | Refills: 0 | Status: SHIPPED | OUTPATIENT
Start: 2019-04-18 | End: 2019-06-03

## 2019-04-18 RX ORDER — ATORVASTATIN CALCIUM 40 MG/1
TABLET, FILM COATED ORAL
Qty: 30 TABLET | Refills: 0 | Status: SHIPPED | OUTPATIENT
Start: 2019-04-18 | End: 2019-06-03

## 2019-06-03 ENCOUNTER — OFFICE VISIT (OUTPATIENT)
Dept: FAMILY MEDICINE | Facility: CLINIC | Age: 59
End: 2019-06-03
Payer: COMMERCIAL

## 2019-06-03 VITALS
RESPIRATION RATE: 14 BRPM | SYSTOLIC BLOOD PRESSURE: 120 MMHG | BODY MASS INDEX: 39.25 KG/M2 | TEMPERATURE: 97.3 F | DIASTOLIC BLOOD PRESSURE: 72 MMHG | WEIGHT: 265 LBS | OXYGEN SATURATION: 94 % | HEART RATE: 65 BPM | HEIGHT: 69 IN

## 2019-06-03 DIAGNOSIS — I21.02 ST ELEVATION MYOCARDIAL INFARCTION INVOLVING LEFT ANTERIOR DESCENDING (LAD) CORONARY ARTERY (H): ICD-10-CM

## 2019-06-03 DIAGNOSIS — I25.119 CORONARY ARTERY DISEASE INVOLVING NATIVE HEART WITH ANGINA PECTORIS, UNSPECIFIED VESSEL OR LESION TYPE (H): ICD-10-CM

## 2019-06-03 DIAGNOSIS — I87.2 VENOUS STASIS DERMATITIS OF BOTH LOWER EXTREMITIES: ICD-10-CM

## 2019-06-03 DIAGNOSIS — Z12.11 SCREEN FOR COLON CANCER: Primary | ICD-10-CM

## 2019-06-03 DIAGNOSIS — G47.9 SLEEP DISORDER: ICD-10-CM

## 2019-06-03 PROCEDURE — 99214 OFFICE O/P EST MOD 30 MIN: CPT | Performed by: FAMILY MEDICINE

## 2019-06-03 RX ORDER — ATORVASTATIN CALCIUM 40 MG/1
40 TABLET, FILM COATED ORAL DAILY
Qty: 90 TABLET | Refills: 3 | Status: SHIPPED | OUTPATIENT
Start: 2019-06-03 | End: 2020-07-02

## 2019-06-03 RX ORDER — ZOLPIDEM TARTRATE 10 MG/1
10 TABLET ORAL
Qty: 10 TABLET | Refills: 0 | Status: SHIPPED | OUTPATIENT
Start: 2019-06-03 | End: 2020-08-28

## 2019-06-03 RX ORDER — LISINOPRIL 30 MG/1
30 TABLET ORAL DAILY
Qty: 90 TABLET | Refills: 3 | Status: SHIPPED | OUTPATIENT
Start: 2019-06-03 | End: 2020-07-02

## 2019-06-03 RX ORDER — METOPROLOL SUCCINATE 25 MG/1
TABLET, EXTENDED RELEASE ORAL
Qty: 90 TABLET | Refills: 3 | Status: SHIPPED | OUTPATIENT
Start: 2019-06-03 | End: 2020-07-02

## 2019-06-03 RX ORDER — CLOPIDOGREL BISULFATE 75 MG/1
75 TABLET ORAL DAILY
Qty: 90 TABLET | Refills: 3 | Status: SHIPPED | OUTPATIENT
Start: 2019-06-03 | End: 2019-07-09

## 2019-06-03 ASSESSMENT — MIFFLIN-ST. JEOR: SCORE: 2012.41

## 2019-06-03 NOTE — PROGRESS NOTES
Subjective     Liborio Medellin is a 58 year old male who presents to clinic today for the following health issues:    Liborio is a 58 yr old male here for medication refills. He has a past history that is significant for hypertension, obesity,hyperlipidemia and coronary artery disease. Reports that he has been doing well and denies any acute symptoms today. Has not followed up with cardiology since his STEMI in 2017.Saw Dr Dunaway at the [facility] for follow up but last visit was in 2017 .For his coronary angiogram he was found to have right dominant coronary system with single vessel CAD there was 90 % ruptured plaque in the prox LAD with embolization of thrombus into the apical LAD. He was on Plavix and coumadin for a couple of months after but the coumadin was stopped after the thrombus resolved.     I encouraged patient to make follow up appointment with cardiology.      HPI   Hyperlipidemia Follow-Up      Are you having any of the following symptoms? (Select all that apply)  Chest pain or pressure    Are you regularly taking any medication or supplement to lower your cholesterol?   Yes- lipitor     Are you having muscle aches or other side effects that you think could be caused by your cholesterol lowering medication?  Yes- occ leg cramps       Hypertension Follow-up      Do you check your blood pressure regularly outside of the clinic? No     Are you following a low salt diet? 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01]     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)     History reviewed. No pertinent surgical history.    Social History     Tobacco Use     Smoking status: Former Smoker     Types: Cigarettes     Smokeless tobacco: Never Used     Tobacco comment: Less than 1 pp week.   Substance Use Topics     Alcohol use: Yes     Comment: Off and on-weekends.     Family History   Problem Relation Age of Onset     Coronary Artery Disease Father         bypass     Gastrointestinal Disease Father      Other Cancer Maternal Grandmother      Coronary Artery Disease Paternal Grandmother          Current Outpatient Medications   Medication Sig Dispense Refill     aspirin 81 MG EC tablet Take 1 tablet (81 mg) by mouth daily 90 tablet 3     atorvastatin (LIPITOR) 40 MG tablet Take 1 tablet (40 mg) by mouth daily 90 tablet 3     benzoyl peroxide (BENZOYL PEROXIDE WASH) 5 % external liquid Apply topically daily as needed 80 g 0     clopidogrel (PLAVIX) 75 MG tablet Take 1 tablet (75 mg) by mouth daily 90 tablet 3     lisinopril (PRINIVIL/ZESTRIL) 30 MG tablet Take 1 tablet (30 mg) by mouth daily 90 tablet 3     metoprolol succinate ER (TOPROL-XL) 25 MG 24 hr tablet TAKE 1/2 TABLET BY MOUTH DAILY 90 tablet 3     triamcinolone (KENALOG) 0.1 % ointment Apply sparingly to affected area three times daily for 14 days. 80 g 0     zolpidem (AMBIEN) 10 MG tablet Take 1 tablet (10 mg) by mouth nightly as needed for sleep 10 tablet 0     order for DME Equipment being ordered: compression stockings above knee 1 pair 1 Units 1     No Known Allergies  BP Readings from Last 3 Encounters:   06/03/19  "120/72   11/25/17 124/81   11/21/17 123/68    Wt Readings from Last 3 Encounters:   06/03/19 120.2 kg (265 lb)   11/25/17 110.9 kg (244 lb 6.4 oz)   11/21/17 110.2 kg (243 lb)                      Reviewed and updated as needed this visit by Provider         Review of Systems   ROS COMP: Constitutional, HEENT, cardiovascular, pulmonary, gi and gu systems are negative, except as otherwise noted.      Objective    /72   Pulse 65   Temp 97.3  F (36.3  C)   Resp 14   Ht 1.753 m (5' 9\")   Wt 120.2 kg (265 lb)   SpO2 94%   BMI 39.13 kg/m    Body mass index is 39.13 kg/m .  Physical Exam   GENERAL: healthy, alert and no distress  EYES: Eyes grossly normal to inspection, PERRL and conjunctivae and sclerae normal  HENT: ear canals and TM's normal, nose and mouth without ulcers or lesions  NECK: no adenopathy, no asymmetry, masses, or scars and thyroid normal to palpation  RESP: lungs clear to auscultation - no rales, rhonchi or wheezes  CV: regular rate and rhythm, normal S1 S2, no S3 or S4, no murmur, click or rub, no peripheral edema and peripheral pulses strong  ABDOMEN: soft, nontender, no hepatosplenomegaly, no masses and bowel sounds normal  MS: no gross musculoskeletal defects noted, no edema    Diagnostic Test Results:  Pending        Assessment & Plan     1. ST elevation myocardial infarction involving left anterior descending (LAD) coronary artery (H)  Doing well overall, encouraged that he sees cardiology for follow up   - atorvastatin (LIPITOR) 40 MG tablet; Take 1 tablet (40 mg) by mouth daily  Dispense: 90 tablet; Refill: 3  - lisinopril (PRINIVIL/ZESTRIL) 30 MG tablet; Take 1 tablet (30 mg) by mouth daily  Dispense: 90 tablet; Refill: 3  - metoprolol succinate ER (TOPROL-XL) 25 MG 24 hr tablet; TAKE 1/2 TABLET BY MOUTH DAILY  Dispense: 90 tablet; Refill: 3  - CARDIOLOGY EVAL ADULT REFERRAL    2. Venous stasis dermatitis of both lower extremities  - benzoyl peroxide (BENZOYL PEROXIDE WASH) 5 % " external liquid; Apply topically daily as needed  Dispense: 80 g; Refill: 0    3. Sleep disorder  Medication refilled,needs sleep study likely has uncontrolled SHREYAS  - zolpidem (AMBIEN) 10 MG tablet; Take 1 tablet (10 mg) by mouth nightly as needed for sleep  Dispense: 10 tablet; Refill: 0    4. Coronary artery disease involving native heart with angina pectoris, unspecified vessel or lesion type (H)  Will like to establish with local cardiology   - clopidogrel (PLAVIX) 75 MG tablet; Take 1 tablet (75 mg) by mouth daily  Dispense: 90 tablet; Refill: 3  - Lipid panel reflex to direct LDL Fasting; Future  - Basic metabolic panel; Future  - CARDIOLOGY EVAL ADULT REFERRAL    5. Screen for colon cancer  Patient will be notified of results  - Fecal colorectal cancer screen (FIT); Future           FUTURE APPOINTMENTS:       - Follow-up visit in 3 months   Patient Instructions         Thank you for choosing [facility].  You may be receiving an email and/or telephone survey request from [facility] [employer] regarding your visit today.  Please take a few minutes to respond to the survey to let us know how we are doing.      If you have questions or concerns, please contact us via [employer] or you can contact your care team at [phone].    Our Clinic hours are:  Monday 6:40 am  to 7:00 pm  Tuesday -Friday 6:40 am to 5:00 pm    The [place] outpatient lab hours are:  Monday - Friday 6:10 am to 4:45 pm  Saturdays 7:00 am to 11:00 am  Appointments are required, call [phone]    If you have clinical questions after hours or would like to schedule an appointment,  call the clinic at [phone].  Patient Education     Heart Disease Education    The heart beats 60 to 100 times per minute, 24 hours a day. This equals almost 100,000 times a day. It pumps blood with oxygen and nutrients to the tissues and organs of the body. But the heart is a muscle and needs its own supply of blood. Blood flow to the heart is  supplied by the coronary arteries. Coronary artery disease (atherosclerosis) is a result of cholesterol, saturated fat, and calcium deposits (plaques) that build up inside the walls. This causes inflammation within the coronary arteries. These plaques narrow the artery and reduce blood flow to the heart muscle. The reduction in blood flow to the heart muscle decreases oxygen supply to the heart. If the narrowing is significant enough, the oxygen supply to one or more regions of the heart can be temporarily or permanently shut down. Sometimes, the plaque can rupture exposing the materials within the plaque to the bloodstream. This can lead to the sudden development of a blood clot on top of the plaque which interrupts blood flow to the heart muscle This can cause chest pain (angina), and possibly death of heart tissue (heart attack).  Types of chest pain  Angina is the name for pain in the heart muscle. Angina is a warning sign of serious heart disease. When untreated it can lead to a heart attack, also known as acute myocardial infarction, or AMI. Angina occurs when there is not enough blood and oxygen flowing to the heart for the amount of work it is doing. This most often happens during physical exertion, when the heart is working hardest. It is usually relieved by rest or nitroglycerin. Angina may also occur after a large meal when extra blood is sent to the digestive organs and less goes to the heart. In the case of advanced or unstable heart disease, angina can occur at rest or awaken you from sleep. Angina usually lasts from a few minutes up to 20 minutes or more. When treated early, the effects of angina can be reversed without permanent damage to the heart. Angina is a serious condition and needs to be evaluated by a medical professional immediately.  There are two types of angina--stable and unstable:    Stable angina usually occurs with a predictable level of activity. Being stable, its character,  severity, and occurrence don't change much over time. It usually starts with activity, and resolves with rest or taking your medicine as instructed by your doctor. The symptoms usually don't last long.    Unstable angina changes or gets worse over time. It is different from whatever you are used to. It may feel different or worse, begin without cause, occur with exercise or exertion, wake you up from sleep, and last longer. It may not respond in the same way as it does when you take your usual medicines for an attack. This type of angina can be a warning sign of an impending heart attack.   A heart attack is usually the result of a blood clot that suddenly forms in a coronary artery that has been narrowed with plaque. When this occurs, blood flow may be cut off to a part of the heart muscle, causing the cells to die. This weakens the pumping action of the heart, which affects the delivery of blood to all the other organs in the body including the brain. If not treated immediately, this damage will become permanent. The earlier treatment is given, the better chance that the heart muscle can be saved.  The pain you feel with angina and a heart attack may have a similar quality. However, it is usually different in intensity and duration. Here are some typical descriptions of a heart attack:    It is most often experienced as a squeezing, crushing, pressure-like sensation in the center of the chest.    It is sometimes described as  something heavy sitting on my chest.     It may feel more like a bad case of indigestion.    The pain may spread from the chest to the arm, shoulder, throat or jaw.    Sometimes the pain is not felt in the chest at all, but only in the arm, shoulder, throat or jaw.    There may also be nausea, vomiting, dizziness or light-headedness, sweating, and trouble breathing.    Palpitations, or your heart beating rapidly    A new, irregular heart beat    Unexplained weakness  You may not be able to  "tell the difference between \"bad\" angina and a heart attack at home. Seek help if your symptoms are different than usual. Don't be in denial or just try to \"tough it out.\"  Call 911  This is the fastest and safest way to get to the emergency department. The paramedics can also start treatment on the way to the hospital, saving valuable time for your heart.    If the angina gets worse, if it continues after taking a nitroglycerine table or using nitroglycerin spray, or if it stops and returns, call 911 right away. Don't delay. You may be having a heart attack.    After you call 911, take a second nitroglycerine tablet or spray unless instructed otherwise. When repeating doses, sit down if possible, because it can make you feel lightheaded or dizzy. Wait another 5 minutes. If the angina still does not go away, take a third nitroglycerin tablet or spray. Don't take more than 3 tablets or sprays within 15 minutes. Stay on the phone with 911 for more instructions.    Your healthcare provider may give you slightly different instructions than those above. If so, follow them carefully.  Don't wait until symptoms become severe to call 911.  Other reasons to call 911 include:    Trouble breathing    Feeling lightheaded, faint, or dizzy    Rapid heart beat    Slower than usual heart rate compared to your normal    Angina with weakness, dizziness, fainting, heavy sweating, nausea, or vomiting    Extreme drowsiness, confusion    Weakness of an arm or leg or one side of the face    Difficulty with speech or vision  When to seek medical care  Remember, the signs and symptoms of a heart attack are not always like they are on TV. Sometimes they are not so obvious. You may only feel weak, or just not right. If it is not clear or if you have any doubt, call for advice.    Seek help if there is a change in the type of pain, if it feels different, or if your symptoms are mild.    Don't drive yourself. Have someone else drive you. If no " "one can drive, call 911.    Don't delay. Fast diagnosis and treatment can prevent or limit the amount of heart damage during a heart attack.    Don't go to your doctor's office or a clinic as they may not be able to provide all the testing and treatment required for this condition.    If your doctor has given you medicine to take when symptoms occur, take them but don't delay getting help trying to locate medicines.  What happens in the emergency room (ER)  The emergency room is connected to your local emergency medical system (EMS) through 911. That's why during a cardiac emergency, calling 911 is the fastest way to get help. The goal of the emergency department is to rapidly screen, evaluate, and treat people.  Once you are there, an electrocardiogram (ECG) will be done. Often this will be done by paramedics who will transmit it to the hospital even before you arrive at the ER. Blood samples may be taken to look for the presence of heart enzymes that leak from damaged heart cells and show if a heart attack is occurring. You will often be evaluated by a heart specialist (cardiologist) who decides the best course of action. In the case of severe angina or early heart attack, and depending on the circumstances, powerful \"clot busting\" medicines can be used to dissolve blood clots in the coronary artery. In most cases, you may be taken to a cardiac catheterization lab for emergency angiography and coronary intervention. During this procedure, a long, thin, plastic tube (catheter) is inserted in an artery in your groin or arm, and thread through the blood vessel to the blockage. Blood flow will be evaluated. If a significant blockage is seen, a balloon and metal mesh coil (stent) may be inserted to open the artery and restore blood flow.   Risk factors for heart disease  Risk factors for heart disease are a combination of genetic and lifestyle. Many risk factors work by either directly or indirectly damaging the blood " vessels of the heart, or by increasing the risk of forming blood or cholesterol clots, which then clog up and block the arteries.   Examples of physical lifestyle risk factors:    Cigarette smoking    High blood pressure    High blood cholesterol    Use of stimulant drugs such as cocaine,  crack,  and amphetamines    Eating high-fat, high-cholesterol foods    Diabetes     Obesity which increases risk for diabetes and high blood pressure    Lack of regular physical activity   Examples of emotional lifestyle factors:    Chronic high stress levels release stress hormones. These raise blood pressure and cholesterol level and makes blood clot more easily.    Held-in anger, hostile or cynical attitude    Social and emotional isolation, lack of intimacy    Loss of relationship    Depression  Other factors that increase the risk of heart attack that you cannot control:    Age. The older you get beyond 40, the greater is your risk of significant coronary artery disease.    Gender. More men than women get heart disease; but once past menopause, women who are not taking estrogen replacement have the same risk as men for a heart attack.    Family history. If your mother, father, brother or sister has coronary artery disease, your risk of having it is higher than a person your age without this family history.  What can you do to decrease your risk  To reduce your risk for heart disease:    Get regular checkups with your doctor.    Take your medicines for blood pressure, cholesterol or diabetes as directed.    Watch your diet. Eat a heart healthy diet choosing fresh foods, less salt, cholesterol, and fat    Stop smoking. Get help if needed.    Get regular exercise.    Manage stress.    Carry a list of medicines and doses in your wallet.  Date Last Reviewed: 5/1/2018 2000-2018 [employer]. [address], [place] [address]. All rights reserved. This information is not intended as a substitute for  professional medical care. Always follow your healthcare professional's instructions.               No follow-ups on file.    Inge Vaughan MD  [facility] [place]

## 2019-06-03 NOTE — PATIENT INSTRUCTIONS
Thank you for choosing [facility].  You may be receiving an email and/or telephone survey request from [facility] [employer] regarding your visit today.  Please take a few minutes to respond to the survey to let us know how we are doing.      If you have questions or concerns, please contact us via [employer] or you can contact your care team at [phone].    Our Clinic hours are:  Monday 6:40 am  to 7:00 pm  Tuesday -Friday 6:40 am to 5:00 pm    The [place] outpatient lab hours are:  Monday - Friday 6:10 am to 4:45 pm  Saturdays 7:00 am to 11:00 am  Appointments are required, call [phone]    If you have clinical questions after hours or would like to schedule an appointment,  call the clinic at [phone].  Patient Education     Heart Disease Education    The heart beats 60 to 100 times per minute, 24 hours a day. This equals almost 100,000 times a day. It pumps blood with oxygen and nutrients to the tissues and organs of the body. But the heart is a muscle and needs its own supply of blood. Blood flow to the heart is supplied by the coronary arteries. Coronary artery disease (atherosclerosis) is a result of cholesterol, saturated fat, and calcium deposits (plaques) that build up inside the walls. This causes inflammation within the coronary arteries. These plaques narrow the artery and reduce blood flow to the heart muscle. The reduction in blood flow to the heart muscle decreases oxygen supply to the heart. If the narrowing is significant enough, the oxygen supply to one or more regions of the heart can be temporarily or permanently shut down. Sometimes, the plaque can rupture exposing the materials within the plaque to the bloodstream. This can lead to the sudden development of a blood clot on top of the plaque which interrupts blood flow to the heart muscle This can cause chest pain (angina), and possibly death of heart tissue (heart attack).  Types of chest pain  Angina is the name  for pain in the heart muscle. Angina is a warning sign of serious heart disease. When untreated it can lead to a heart attack, also known as acute myocardial infarction, or AMI. Angina occurs when there is not enough blood and oxygen flowing to the heart for the amount of work it is doing. This most often happens during physical exertion, when the heart is working hardest. It is usually relieved by rest or nitroglycerin. Angina may also occur after a large meal when extra blood is sent to the digestive organs and less goes to the heart. In the case of advanced or unstable heart disease, angina can occur at rest or awaken you from sleep. Angina usually lasts from a few minutes up to 20 minutes or more. When treated early, the effects of angina can be reversed without permanent damage to the heart. Angina is a serious condition and needs to be evaluated by a medical professional immediately.  There are two types of angina--stable and unstable:    Stable angina usually occurs with a predictable level of activity. Being stable, its character, severity, and occurrence don't change much over time. It usually starts with activity, and resolves with rest or taking your medicine as instructed by your doctor. The symptoms usually don't last long.    Unstable angina changes or gets worse over time. It is different from whatever you are used to. It may feel different or worse, begin without cause, occur with exercise or exertion, wake you up from sleep, and last longer. It may not respond in the same way as it does when you take your usual medicines for an attack. This type of angina can be a warning sign of an impending heart attack.   A heart attack is usually the result of a blood clot that suddenly forms in a coronary artery that has been narrowed with plaque. When this occurs, blood flow may be cut off to a part of the heart muscle, causing the cells to die. This weakens the pumping action of the heart, which affects the  "delivery of blood to all the other organs in the body including the brain. If not treated immediately, this damage will become permanent. The earlier treatment is given, the better chance that the heart muscle can be saved.  The pain you feel with angina and a heart attack may have a similar quality. However, it is usually different in intensity and duration. Here are some typical descriptions of a heart attack:    It is most often experienced as a squeezing, crushing, pressure-like sensation in the center of the chest.    It is sometimes described as  something heavy sitting on my chest.     It may feel more like a bad case of indigestion.    The pain may spread from the chest to the arm, shoulder, throat or jaw.    Sometimes the pain is not felt in the chest at all, but only in the arm, shoulder, throat or jaw.    There may also be nausea, vomiting, dizziness or light-headedness, sweating, and trouble breathing.    Palpitations, or your heart beating rapidly    A new, irregular heart beat    Unexplained weakness  You may not be able to tell the difference between \"bad\" angina and a heart attack at home. Seek help if your symptoms are different than usual. Don't be in denial or just try to \"tough it out.\"  Call 911  This is the fastest and safest way to get to the emergency department. The paramedics can also start treatment on the way to the hospital, saving valuable time for your heart.    If the angina gets worse, if it continues after taking a nitroglycerine table or using nitroglycerin spray, or if it stops and returns, call 911 right away. Don't delay. You may be having a heart attack.    After you call 911, take a second nitroglycerine tablet or spray unless instructed otherwise. When repeating doses, sit down if possible, because it can make you feel lightheaded or dizzy. Wait another 5 minutes. If the angina still does not go away, take a third nitroglycerin tablet or spray. Don't take more than 3 tablets " or sprays within 15 minutes. Stay on the phone with 911 for more instructions.    Your healthcare provider may give you slightly different instructions than those above. If so, follow them carefully.  Don't wait until symptoms become severe to call 911.  Other reasons to call 911 include:    Trouble breathing    Feeling lightheaded, faint, or dizzy    Rapid heart beat    Slower than usual heart rate compared to your normal    Angina with weakness, dizziness, fainting, heavy sweating, nausea, or vomiting    Extreme drowsiness, confusion    Weakness of an arm or leg or one side of the face    Difficulty with speech or vision  When to seek medical care  Remember, the signs and symptoms of a heart attack are not always like they are on TV. Sometimes they are not so obvious. You may only feel weak, or just not right. If it is not clear or if you have any doubt, call for advice.    Seek help if there is a change in the type of pain, if it feels different, or if your symptoms are mild.    Don't drive yourself. Have someone else drive you. If no one can drive, call 911.    Don't delay. Fast diagnosis and treatment can prevent or limit the amount of heart damage during a heart attack.    Don't go to your doctor's office or a clinic as they may not be able to provide all the testing and treatment required for this condition.    If your doctor has given you medicine to take when symptoms occur, take them but don't delay getting help trying to locate medicines.  What happens in the emergency room (ER)  The emergency room is connected to your local emergency medical system (EMS) through 911. That's why during a cardiac emergency, calling 911 is the fastest way to get help. The goal of the emergency department is to rapidly screen, evaluate, and treat people.  Once you are there, an electrocardiogram (ECG) will be done. Often this will be done by paramedics who will transmit it to the hospital even before you arrive at the ER.  "Blood samples may be taken to look for the presence of heart enzymes that leak from damaged heart cells and show if a heart attack is occurring. You will often be evaluated by a heart specialist (cardiologist) who decides the best course of action. In the case of severe angina or early heart attack, and depending on the circumstances, powerful \"clot busting\" medicines can be used to dissolve blood clots in the coronary artery. In most cases, you may be taken to a cardiac catheterization lab for emergency angiography and coronary intervention. During this procedure, a long, thin, plastic tube (catheter) is inserted in an artery in your groin or arm, and thread through the blood vessel to the blockage. Blood flow will be evaluated. If a significant blockage is seen, a balloon and metal mesh coil (stent) may be inserted to open the artery and restore blood flow.   Risk factors for heart disease  Risk factors for heart disease are a combination of genetic and lifestyle. Many risk factors work by either directly or indirectly damaging the blood vessels of the heart, or by increasing the risk of forming blood or cholesterol clots, which then clog up and block the arteries.   Examples of physical lifestyle risk factors:    Cigarette smoking    High blood pressure    High blood cholesterol    Use of stimulant drugs such as cocaine,  crack,  and amphetamines    Eating high-fat, high-cholesterol foods    Diabetes     Obesity which increases risk for diabetes and high blood pressure    Lack of regular physical activity   Examples of emotional lifestyle factors:    Chronic high stress levels release stress hormones. These raise blood pressure and cholesterol level and makes blood clot more easily.    Held-in anger, hostile or cynical attitude    Social and emotional isolation, lack of intimacy    Loss of relationship    Depression  Other factors that increase the risk of heart attack that you cannot control:    Age. The older " you get beyond 40, the greater is your risk of significant coronary artery disease.    Gender. More men than women get heart disease; but once past menopause, women who are not taking estrogen replacement have the same risk as men for a heart attack.    Family history. If your mother, father, brother or sister has coronary artery disease, your risk of having it is higher than a person your age without this family history.  What can you do to decrease your risk  To reduce your risk for heart disease:    Get regular checkups with your doctor.    Take your medicines for blood pressure, cholesterol or diabetes as directed.    Watch your diet. Eat a heart healthy diet choosing fresh foods, less salt, cholesterol, and fat    Stop smoking. Get help if needed.    Get regular exercise.    Manage stress.    Carry a list of medicines and doses in your wallet.  Date Last Reviewed: 5/1/2018 2000-2018 [employer]. [address] [place] [address], [place] [address]. All rights reserved. This information is not intended as a substitute for professional medical care. Always follow your healthcare professional's instructions.

## 2019-07-09 ENCOUNTER — OFFICE VISIT (OUTPATIENT)
Dept: CARDIOLOGY | Facility: CLINIC | Age: 59
End: 2019-07-09
Attending: FAMILY MEDICINE
Payer: COMMERCIAL

## 2019-07-09 VITALS
WEIGHT: 261.8 LBS | OXYGEN SATURATION: 95 % | BODY MASS INDEX: 38.66 KG/M2 | DIASTOLIC BLOOD PRESSURE: 80 MMHG | HEART RATE: 59 BPM | SYSTOLIC BLOOD PRESSURE: 127 MMHG

## 2019-07-09 DIAGNOSIS — I23.6 LEFT VENTRICULAR APICAL THROMBUS FOLLOWING MI (H): Primary | ICD-10-CM

## 2019-07-09 DIAGNOSIS — I25.10 CORONARY ARTERY DISEASE INVOLVING NATIVE CORONARY ARTERY OF NATIVE HEART WITHOUT ANGINA PECTORIS: ICD-10-CM

## 2019-07-09 DIAGNOSIS — I25.10 CORONARY ARTERY DISEASE INVOLVING NATIVE CORONARY ARTERY OF NATIVE HEART WITHOUT ANGINA PECTORIS: Primary | ICD-10-CM

## 2019-07-09 LAB
ALT SERPL W P-5'-P-CCNC: 50 U/L (ref 0–70)
ANION GAP SERPL CALCULATED.3IONS-SCNC: 2 MMOL/L (ref 3–14)
BUN SERPL-MCNC: 32 MG/DL (ref 7–30)
CALCIUM SERPL-MCNC: 9.2 MG/DL (ref 8.5–10.1)
CHLORIDE SERPL-SCNC: 104 MMOL/L (ref 94–109)
CHOLEST SERPL-MCNC: 139 MG/DL
CO2 SERPL-SCNC: 29 MMOL/L (ref 20–32)
CREAT SERPL-MCNC: 1.25 MG/DL (ref 0.66–1.25)
GFR SERPL CREATININE-BSD FRML MDRD: 63 ML/MIN/{1.73_M2}
GLUCOSE SERPL-MCNC: 109 MG/DL (ref 70–99)
HDLC SERPL-MCNC: 59 MG/DL
LDLC SERPL CALC-MCNC: 59 MG/DL
NONHDLC SERPL-MCNC: 80 MG/DL
POTASSIUM SERPL-SCNC: 4.6 MMOL/L (ref 3.4–5.3)
SODIUM SERPL-SCNC: 135 MMOL/L (ref 133–144)
TRIGL SERPL-MCNC: 104 MG/DL

## 2019-07-09 PROCEDURE — 84460 ALANINE AMINO (ALT) (SGPT): CPT | Performed by: INTERNAL MEDICINE

## 2019-07-09 PROCEDURE — 36415 COLL VENOUS BLD VENIPUNCTURE: CPT | Performed by: INTERNAL MEDICINE

## 2019-07-09 PROCEDURE — 80048 BASIC METABOLIC PNL TOTAL CA: CPT | Performed by: INTERNAL MEDICINE

## 2019-07-09 PROCEDURE — 80061 LIPID PANEL: CPT | Performed by: INTERNAL MEDICINE

## 2019-07-09 PROCEDURE — 99214 OFFICE O/P EST MOD 30 MIN: CPT | Performed by: INTERNAL MEDICINE

## 2019-07-09 NOTE — LETTER
7/9/2019    Physician No Ref-Primary  No address on file    RE: Liborio Leungdoris       Dear Colleague,    I had the pleasure of seeing Liborio THAYER Leiaspenserdoris in the [facility].    CARDIOLOGY VISIT    REASON FOR VISIT: f/u CAD    SUBJECTIVE:  58-year-old male seen for coronary artery disease.     In 2017 he presented to the [facility] with a STEMI, peak troponin 12.  Angiogram showed 90% ruptured plaque of the proximal LAD with embolization to the apical LAD, he underwent aspiration and drug eluding stent, he had poor flow in the apical LAD mild disease of the circumflex and RCA.  Cardiac MRI showed EF 49%, akinesis of the apical inferior and apical segments, delayed enhancement with transmural infarct of the apical inferior wall, also to small areas of hyper enhancement suggesting embolic MI of the LAD territory.  He does small apical thrombus, but this resolved and he was only on a short course of warfarin.    Echo June 2018 showed EF 60%, apical akinesis, normal RV, no valve disease.     He has been doing well recently.  He does a lot of physical activity and has no exertional shortness of breath or chest pain.  He has an occasional twinge of chest pain, but this only lasts a few seconds and comes on randomly.  Has some very minimal lower extremity edema which is unchanged.    MEDICATIONS:  Current Outpatient Medications   Medication     aspirin 81 MG EC tablet     atorvastatin (LIPITOR) 40 MG tablet     benzoyl peroxide (BENZOYL PEROXIDE WASH) 5 % external liquid     clopidogrel (PLAVIX) 75 MG tablet     lisinopril (PRINIVIL/ZESTRIL) 30 MG tablet     metoprolol succinate ER (TOPROL-XL) 25 MG 24 hr tablet     order for DME     triamcinolone (KENALOG) 0.1 % ointment     zolpidem (AMBIEN) 10 MG tablet     No current facility-administered medications for this visit.        ALLERGIES:  No Known Allergies    REVIEW OF SYSTEMS:  Constitutional:  No weight loss, fever, chills, weakness or  fatigue.  HEENT:  Eyes:  No visual loss, blurred vision, double vision or yellow sclerae. No hearing loss, sneezing, congestion, runny nose or sore throat.  Skin:  No rash or itching.  Cardiovascular: per HPI  Respiratory: per HPI  GI:  No anorexia, nausea, vomiting or diarrhea. No abdominal pain or blood.  :  No dysurea, hematuria  Neurologic:  No headache, dizziness, syncope, paralysis, ataxia, numbness or tingling in the extremities. No change in bowel or bladder control.  Musculoskeletal:  No muscle, back pain, joint pain or stiffness.  Hematologic:  No anemia, bleeding or bruising.  Lymphatics:  No enlarged nodes. No history of splenectomy.  Psychiatric:  No history of depression or anxiety.  Endocrine:  No reports of sweating, cold or heat intolerance. No polyuria or polydipsia.  Allergies:  No history of asthma, hives, eczema or rhinitis.    PHYSICAL EXAM:  /80 (BP Location: Right arm, Patient Position: Sitting, Cuff Size: Adult Regular)   Pulse 59   Wt 118.8 kg (261 lb 12.8 oz)   SpO2 95%   BMI 38.66 kg/m     Constitutional: awake, alert, no distress  Eyes: PERRL, sclera nonicteric  ENT: trachea midline  Respiratory: Lungs clear  Cardiovascular: Regular rate and rhythm, no murmurs  GI: nondistended, nontender, bowel sounds present  Lymph/Hematologic: no lymphadenopathy  Skin: dry, no rash  Musculoskeletal: good muscle tone, strength 5/5 in upper and lower extremities  Neurologic: no focal deficits  Neuropsychiatric: appropriate affact    DATA:  Lab: November 2017: Cholesterol 115, HDL 47, LDL 51, creatinine 1.0     ASSESSMENT:  58 Year-old male seen for follow-up of coronary artery disease.  Overall he is doing well and has no concerning symptoms.  He is now about 2 years out from his stent, therefore clopidogrel will be discontinued.  There is no need for any additional cardiac testing at this point.    RECOMMENDATIONS:  1.  Coronary artery disease, status post LAD stent  - Discontinue  clopidogrel, continue other medications  -check fasting lipids today and BMP  - Consider stress test in about 2 years or so    Follow-up in 1 year with YULISSA.    Stephen Santiago MD  Cardiology - [facility]  Pager:  [phone]  Text Page  July 9, 2019      Thank you for allowing me to participate in the care of your patient.    Sincerely,     Stephen Santiago MD     [facility]

## 2019-07-09 NOTE — PATIENT INSTRUCTIONS
1. Stop clopidogrel    2. Keep taking aspirin 81mg once daily    3. Will check cholesterol labs today

## 2019-07-09 NOTE — PROGRESS NOTES
CARDIOLOGY VISIT    REASON FOR VISIT: f/u CAD    SUBJECTIVE:  58-year-old male seen for coronary artery disease.     In 2017 he presented to the [facility] with a STEMI, peak troponin 12.  Angiogram showed 90% ruptured plaque of the proximal LAD with embolization to the apical LAD, he underwent aspiration and drug eluding stent, he had poor flow in the apical LAD mild disease of the circumflex and RCA.  Cardiac MRI showed EF 49%, akinesis of the apical inferior and apical segments, delayed enhancement with transmural infarct of the apical inferior wall, also to small areas of hyper enhancement suggesting embolic MI of the LAD territory.  He does small apical thrombus, but this resolved and he was only on a short course of warfarin.    Echo June 2018 showed EF 60%, apical akinesis, normal RV, no valve disease.     He has been doing well recently.  He does a lot of physical activity and has no exertional shortness of breath or chest pain.  He has an occasional twinge of chest pain, but this only lasts a few seconds and comes on randomly.  Has some very minimal lower extremity edema which is unchanged.    MEDICATIONS:  Current Outpatient Medications   Medication     aspirin 81 MG EC tablet     atorvastatin (LIPITOR) 40 MG tablet     benzoyl peroxide (BENZOYL PEROXIDE WASH) 5 % external liquid     clopidogrel (PLAVIX) 75 MG tablet     lisinopril (PRINIVIL/ZESTRIL) 30 MG tablet     metoprolol succinate ER (TOPROL-XL) 25 MG 24 hr tablet     order for DME     triamcinolone (KENALOG) 0.1 % ointment     zolpidem (AMBIEN) 10 MG tablet     No current facility-administered medications for this visit.        ALLERGIES:  No Known Allergies    REVIEW OF SYSTEMS:  Constitutional:  No weight loss, fever, chills, weakness or fatigue.  HEENT:  Eyes:  No visual loss, blurred vision, double vision or yellow sclerae. No hearing loss, sneezing, congestion, runny nose or sore throat.  Skin:  No rash or itching.  Cardiovascular: per  HPI  Respiratory: per HPI  GI:  No anorexia, nausea, vomiting or diarrhea. No abdominal pain or blood.  :  No dysurea, hematuria  Neurologic:  No headache, dizziness, syncope, paralysis, ataxia, numbness or tingling in the extremities. No change in bowel or bladder control.  Musculoskeletal:  No muscle, back pain, joint pain or stiffness.  Hematologic:  No anemia, bleeding or bruising.  Lymphatics:  No enlarged nodes. No history of splenectomy.  Psychiatric:  No history of depression or anxiety.  Endocrine:  No reports of sweating, cold or heat intolerance. No polyuria or polydipsia.  Allergies:  No history of asthma, hives, eczema or rhinitis.    PHYSICAL EXAM:  /80 (BP Location: Right arm, Patient Position: Sitting, Cuff Size: Adult Regular)   Pulse 59   Wt 118.8 kg (261 lb 12.8 oz)   SpO2 95%   BMI 38.66 kg/m    Constitutional: awake, alert, no distress  Eyes: PERRL, sclera nonicteric  ENT: trachea midline  Respiratory: Lungs clear  Cardiovascular: Regular rate and rhythm, no murmurs  GI: nondistended, nontender, bowel sounds present  Lymph/Hematologic: no lymphadenopathy  Skin: dry, no rash  Musculoskeletal: good muscle tone, strength 5/5 in upper and lower extremities  Neurologic: no focal deficits  Neuropsychiatric: appropriate affact    DATA:  Lab: November 2017: Cholesterol 115, HDL 47, LDL 51, creatinine 1.0     ASSESSMENT:  58 Year-old male seen for follow-up of coronary artery disease.  Overall he is doing well and has no concerning symptoms.  He is now about 2 years out from his stent, therefore clopidogrel will be discontinued.  There is no need for any additional cardiac testing at this point.    RECOMMENDATIONS:  1.  Coronary artery disease, status post LAD stent  - Discontinue clopidogrel, continue other medications  -check fasting lipids today and BMP  - Consider stress test in about 2 years or so    Follow-up in 1 year with YULISSA.    Stephen Santiago MD  Cardiology - [facility]  Pager:   [phone]  Text Page  July 9, 2019

## 2019-11-03 ENCOUNTER — HEALTH MAINTENANCE LETTER (OUTPATIENT)
Age: 59
End: 2019-11-03

## 2019-12-04 ENCOUNTER — OFFICE VISIT (OUTPATIENT)
Dept: FAMILY MEDICINE | Facility: CLINIC | Age: 59
End: 2019-12-04
Payer: COMMERCIAL

## 2019-12-04 VITALS
TEMPERATURE: 98.1 F | WEIGHT: 273 LBS | BODY MASS INDEX: 40.43 KG/M2 | HEIGHT: 69 IN | RESPIRATION RATE: 18 BRPM | DIASTOLIC BLOOD PRESSURE: 58 MMHG | OXYGEN SATURATION: 97 % | SYSTOLIC BLOOD PRESSURE: 124 MMHG | HEART RATE: 74 BPM

## 2019-12-04 DIAGNOSIS — M79.621 PAIN OF RIGHT UPPER ARM: ICD-10-CM

## 2019-12-04 DIAGNOSIS — G89.29 CHRONIC RIGHT SHOULDER PAIN: Primary | ICD-10-CM

## 2019-12-04 DIAGNOSIS — Z23 NEED FOR PROPHYLACTIC VACCINATION AND INOCULATION AGAINST INFLUENZA: ICD-10-CM

## 2019-12-04 DIAGNOSIS — M25.511 CHRONIC RIGHT SHOULDER PAIN: Primary | ICD-10-CM

## 2019-12-04 DIAGNOSIS — Z12.11 SCREEN FOR COLON CANCER: ICD-10-CM

## 2019-12-04 PROCEDURE — 99213 OFFICE O/P EST LOW 20 MIN: CPT | Mod: 25 | Performed by: FAMILY MEDICINE

## 2019-12-04 PROCEDURE — 90686 IIV4 VACC NO PRSV 0.5 ML IM: CPT | Performed by: FAMILY MEDICINE

## 2019-12-04 PROCEDURE — 90471 IMMUNIZATION ADMIN: CPT | Performed by: FAMILY MEDICINE

## 2019-12-04 ASSESSMENT — MIFFLIN-ST. JEOR: SCORE: 2043.7

## 2019-12-04 NOTE — PROGRESS NOTES
Subjective     Liborio Medellin is a 59 year old male who presents to clinic today for the following health issues:     59 yr old male here for right shoulder pain and right upper arm pain. Reports that he may have had an injury in the shoulder about eight months ago and the pain has gradually increased since then . There is reduced strength in the arm and he finds that he is dropping objects sometimes. Denies any numbness or tingling in his hands.     HPI   Joint Pain    Onset: 8 mos increased 1 mo ago     Description: Patient was lifting something that was very heavy and could not but it down he felt a pulling of he bicept at that time above 8 mos ago and has had trouble ever since. It did increase about 1 mo ago and now is having trouble with lifting to the side above head and grasping anything. Has pain in R/ biocept,  Shoulder, and shoulder blade    Location: right shoulder R arm  and into shoulder blade   Character: Sharp, Dull ache, Stabbing and throbbing throughout the day     Intensity: moderate, can be severe at time with movement     Progression of Symptoms: better without movement     Accompanying Signs & Symptoms:  Other symptoms: radiation of pain to shoulder blade  and weakness of arm can't lift     History:   Previous similar pain: YES- with the occupation he is in       Precipitating factors:   Trauma or overuse: YES- overuse     Alleviating factors:  Improved by: rest/inactivity    Therapies Tried and outcome: rest and inactivity           Patient Active Problem List   Diagnosis     Cellulitis due to MRSA     Advanced directives, counseling/discussion     Tobacco use disorder     Uncontrolled hypertension     STEMI involving left anterior descending coronary artery (H)     Long-term (current) use of anticoagulants [Z79.01]     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)     History reviewed. No pertinent surgical history.    Social History     Tobacco Use     Smoking status: Current Some Day  "Smoker     Types: Cigarettes     Smokeless tobacco: Never Used     Tobacco comment: 1-2 cigs daily   Substance Use Topics     Alcohol use: Yes     Comment: Off and on-weekends.     Family History   Problem Relation Age of Onset     Coronary Artery Disease Father         bypass     Gastrointestinal Disease Father      Other Cancer Maternal Grandmother      Coronary Artery Disease Paternal Grandmother          Current Outpatient Medications   Medication Sig Dispense Refill     aspirin 81 MG EC tablet Take 1 tablet (81 mg) by mouth daily 90 tablet 3     atorvastatin (LIPITOR) 40 MG tablet Take 1 tablet (40 mg) by mouth daily 90 tablet 3     lisinopril (PRINIVIL/ZESTRIL) 30 MG tablet Take 1 tablet (30 mg) by mouth daily 90 tablet 3     metoprolol succinate ER (TOPROL-XL) 25 MG 24 hr tablet TAKE 1/2 TABLET BY MOUTH DAILY 90 tablet 3     benzoyl peroxide (BENZOYL PEROXIDE WASH) 5 % external liquid Apply topically daily as needed 80 g 0     order for DME Equipment being ordered: compression stockings above knee 1 pair (Patient not taking: Reported on 7/9/2019) 1 Units 1     triamcinolone (KENALOG) 0.1 % ointment Apply sparingly to affected area three times daily for 14 days. 80 g 0     zolpidem (AMBIEN) 10 MG tablet Take 1 tablet (10 mg) by mouth nightly as needed for sleep (Patient not taking: Reported on 12/4/2019) 10 tablet 0     No Known Allergies      Reviewed and updated as needed this visit by Provider  Tobacco  Allergies  Meds  Problems  Med Hx  Surg Hx  Fam Hx         Review of Systems   ROS COMP: Constitutional, HEENT, cardiovascular, pulmonary, gi and gu systems are negative, except as otherwise noted.      Objective    /58   Pulse 74   Temp 98.1  F (36.7  C) (Tympanic)   Resp 18   Ht 1.753 m (5' 9\")   Wt 123.8 kg (273 lb)   SpO2 97%   BMI 40.32 kg/m    Body mass index is 40.32 kg/m .  Physical Exam   GENERAL: healthy, alert and no distress  NECK: no adenopathy, no asymmetry, masses, or scars " and thyroid normal to palpation  RESP: lungs clear to auscultation - no rales, rhonchi or wheezes  CV: regular rate and rhythm, normal S1 S2, no S3 or S4, no murmur, click or rub, no peripheral edema and peripheral pulses strong  MS: decreased range of motion of the right shoulder    Diagnostic Test Results:  none         Assessment & Plan       ICD-10-CM    1. Chronic right shoulder pain M25.511 MR Shoulder Right w/o Contrast    G89.29    2. Pain of right upper arm M79.621 MR Humerus Upper Arm Right wo Contrast   3. Screen for colon cancer Z12.11 Fecal colorectal cancer screen (FIT)   4. Need for prophylactic vaccination and inoculation against influenza Z23 Vaccine Administration, Initial [87850]     INFLUENZA VACCINE IM > 6 MONTHS VALENT IIV4 [96430]     Vaccine Administration, Initial [38360]     CANCELED: INFLUENZA QUAD, RECOMBINANT, P-FREE (RIV4) (FLUBLOCK) [47924]   59 yr old male here for chronic right shoulder and right upper arm- symptom appears to be worsening ,especially now that he is losing strength in his hands. The pain started following an injury. An MRI is ordered . Patient will be notified of results .             Return in about 4 weeks (around 1/1/2020) for Routine Visit.    Inge Vaughan MD  [facility]

## 2019-12-04 NOTE — PATIENT INSTRUCTIONS
Thank you for choosing [facility].  You may be receiving an email and/or telephone survey request from [facility] [employer] regarding your visit today.  Please take a few minutes to respond to the survey to let us know how we are doing.      If you have questions or concerns, please contact us via [employer] or you can contact your care team at [phone].    Our Clinic hours are:  Monday 6:40 am  to 7:00 pm  Tuesday -Friday 6:40 am to 5:00 pm    The [place] outpatient lab hours are:  Monday - Friday 6:10 am to 4:45 pm  Saturdays 7:00 am to 11:00 am  Appointments are required, call [phone]    If you have clinical questions after hours or would like to schedule an appointment,  call the clinic at [phone].

## 2019-12-05 ENCOUNTER — HOSPITAL ENCOUNTER (OUTPATIENT)
Dept: MRI IMAGING | Facility: CLINIC | Age: 59
Discharge: HOME | End: 2019-12-05
Attending: FAMILY MEDICINE | Admitting: FAMILY MEDICINE
Payer: COMMERCIAL

## 2019-12-05 ENCOUNTER — HOSPITAL ENCOUNTER (OUTPATIENT)
Dept: MRI IMAGING | Facility: CLINIC | Age: 59
End: 2019-12-05
Attending: FAMILY MEDICINE
Payer: COMMERCIAL

## 2019-12-05 DIAGNOSIS — M79.621 PAIN OF RIGHT UPPER ARM: ICD-10-CM

## 2019-12-05 DIAGNOSIS — G89.29 CHRONIC RIGHT SHOULDER PAIN: ICD-10-CM

## 2019-12-05 DIAGNOSIS — M25.511 CHRONIC RIGHT SHOULDER PAIN: ICD-10-CM

## 2019-12-05 PROCEDURE — 73218 MRI UPPER EXTREMITY W/O DYE: CPT | Mod: RT

## 2019-12-05 PROCEDURE — 73221 MRI JOINT UPR EXTREM W/O DYE: CPT | Mod: RT

## 2019-12-18 DIAGNOSIS — S46.211D TEAR OF RIGHT BICEPS MUSCLE, SUBSEQUENT ENCOUNTER: ICD-10-CM

## 2019-12-18 DIAGNOSIS — M75.121 COMPLETE TEAR OF RIGHT ROTATOR CUFF, UNSPECIFIED WHETHER TRAUMATIC: Primary | ICD-10-CM

## 2020-07-01 DIAGNOSIS — I21.02 ST ELEVATION MYOCARDIAL INFARCTION INVOLVING LEFT ANTERIOR DESCENDING (LAD) CORONARY ARTERY (H): ICD-10-CM

## 2020-07-02 RX ORDER — ATORVASTATIN CALCIUM 40 MG/1
TABLET, FILM COATED ORAL
Qty: 90 TABLET | Refills: 1 | Status: SHIPPED | OUTPATIENT
Start: 2020-07-02 | End: 2020-09-22

## 2020-07-02 RX ORDER — METOPROLOL SUCCINATE 25 MG/1
TABLET, EXTENDED RELEASE ORAL
Qty: 90 TABLET | Refills: 1 | Status: SHIPPED | OUTPATIENT
Start: 2020-07-02 | End: 2020-09-22

## 2020-07-02 RX ORDER — LISINOPRIL 30 MG/1
TABLET ORAL
Qty: 90 TABLET | Refills: 1 | Status: SHIPPED | OUTPATIENT
Start: 2020-07-02 | End: 2020-09-22

## 2020-08-28 ENCOUNTER — VIRTUAL VISIT (OUTPATIENT)
Dept: CARDIOLOGY | Facility: CLINIC | Age: 60
End: 2020-08-28
Payer: COMMERCIAL

## 2020-08-28 DIAGNOSIS — I25.10 CORONARY ARTERY DISEASE INVOLVING NATIVE CORONARY ARTERY OF NATIVE HEART WITHOUT ANGINA PECTORIS: Primary | ICD-10-CM

## 2020-08-28 DIAGNOSIS — I10 BENIGN ESSENTIAL HYPERTENSION: ICD-10-CM

## 2020-08-28 DIAGNOSIS — E78.5 HYPERLIPIDEMIA LDL GOAL <70: ICD-10-CM

## 2020-08-28 PROCEDURE — 99213 OFFICE O/P EST LOW 20 MIN: CPT | Mod: 95 | Performed by: NURSE PRACTITIONER

## 2020-08-28 NOTE — LETTER
"8/28/2020    Physician No Ref-Primary  No address on file    RE: Liborio Medellin       Dear Colleague,    I had the pleasure of seeing Liborio Medellin in the [facility].    Liborio Medellin is a 59 year old male who is being evaluated via a billable telephone visit.      The patient has been notified of following:     \"This telephone visit will be conducted via a call between you and your physician/provider. We have found that certain health care needs can be provided without the need for a physical exam.  This service lets us provide the care you need with a short phone conversation.  If a prescription is necessary we can send it directly to your pharmacy.  If lab work is needed we can place an order for that and you can then stop by our lab to have the test done at a later time.    Telephone visits are billed at different rates depending on your insurance coverage. During this emergency period, for some insurers they may be billed the same as an in-person visit.  Please reach out to your insurance provider with any questions.    If during the course of the call the physician/provider feels a telephone visit is not appropriate, you will not be charged for this service.\"    Patient has given verbal consent for Telephone visit?  Yes    What phone number would you like to be contacted at? [phone]    How would you like to obtain your AVS? Mail a copy    Phone call duration: 10 minutes    CUONG Knox [facility]      Cardiology Clinic Progress Note  Liborio Medellin MRN# [id number]   YOB: 1960 Age: 59 year old      Primary Cardiologist:   Dr. Santiago           History of Presenting Illness:      Liborio Medellin is a pleasant 59 year old patient with a past cardiac history significant for   1. CAD   2. Hypertension  3. Hyperlipidemia  Past medical history significant for ongoing tobacco abuse.    In 2017 he presented to the [facility] with a STEMI, " peak troponin 12.  Angiogram showed 90% ruptured plaque of the proximal LAD with embolization to the apical LAD, he underwent aspiration and drug eluding stent, he had poor flow in the apical LAD mild disease of the circumflex and RCA.  Cardiac MRI showed EF 49%, akinesis of the apical inferior and apical segments, delayed enhancement with transmural infarct of the apical inferior wall, also to small areas of hyper enhancement suggesting embolic MI of the LAD territory.  He had small apical thrombus, but this resolved and he was only on a short course of warfarin. Echo June 2018 showed EF 60%, apical akinesis, normal RV, no valve disease.     Patient was last seen by Dr. Santiago in July 2019.  He was doing well from a cardiac standpoint without any anginal symptoms.  He had occasional twinge of chest pain lasting a few seconds occurring randomly.  He had very minimal lower extremity edema which was stable.  Plavix was discontinued as he was 2 years post stenting.  He recommended stress test in 2021.    Pt presents today for annual follow-up.  He reports 2 episodes of chest pain occurring with just light activity and lasting for approximately 5 minutes before resolving spontaneously.  This is described as a chest pressure.  The last episode occurred 3 weeks ago.  He prefers to monitor at this point and if having more chest discomfort we can proceed with stress testing.  He notes intermittent lower extremity edema which is not currently present.  He does not have a way to assess blood pressure at home.  He is agreeable to going in for annual lab work. Patient reports no shortness of breath, PND, orthopnea, presyncope, syncope, heart racing, or palpitations.        Current Cardiac Medications    aspirin 81 MG EC tablet Take 1 tablet (81 mg) by mouth daily    atorvastatin (LIPITOR) 40 MG tablet TAKE ONE TABLET BY MOUTH ONCE DAILY    lisinopril (ZESTRIL) 30 MG tablet TAKE ONE TABLET BY MOUTH ONCE DAILY    metoprolol  succinate ER (TOPROL-XL) 25 MG 24 hr tablet TAKE 1/2 TABLET BY MOUTH DAILY                        Assessment and Plan:       Plan  1. Check blood pressure at least 1 hour after medications. Call the clinic if your blood pressure is consistently greater than 130/80.   2. Call if having more chest discomfort and we can do stress test     Follow-up:  1. Call to schedule annual Fasting lab in 2 months (lipid/ALT, BMP)   2. See Dr. Santiago for cardiology follow up at [facility]: Aug 2021      1. CAD    STEMI 2017 JESSICA x1 to LAD with small apical thrombus resolved with warfarin    2 episodes of chest pain about 1 month ago     Continue statin, aspirin, ACE inhibitor, beta-blocker    Consider stress test in 2021 if not done sooner       2. Hypertension    Not able to assess at home     Continue lisinopril, metoprolol      3. Hyperlipidemia    Last LDL 59 7/2019    Continue atorvastatin 40 mg daily         Thank you for allowing me to participate in this delightful patient's care.      This note was completed in part using Dragon voice recognition software. Although reviewed after completion, some word and grammatical errors may occur.    Petra Mas, APRN CNP, APRN, CNP           Data:   All laboratory data reviewed      HPI and Plan:   See dictation    Orders Placed This Encounter   Procedures     Basic metabolic panel     Lipid Profile     ALT     Follow-Up with Cardiologist       No orders of the defined types were placed in this encounter.      Medications Discontinued During This Encounter   Medication Reason     zolpidem (AMBIEN) 10 MG tablet          Encounter Diagnoses   Name Primary?     Coronary artery disease involving native coronary artery of native heart without angina pectoris Yes     Benign essential hypertension      Hyperlipidemia LDL goal <70        CURRENT MEDICATIONS:  Current Outpatient Medications   Medication Sig Dispense Refill     aspirin 81 MG EC tablet Take 1 tablet (81  mg) by mouth daily 90 tablet 3     atorvastatin (LIPITOR) 40 MG tablet TAKE ONE TABLET BY MOUTH ONCE DAILY 90 tablet 1     benzoyl peroxide (BENZOYL PEROXIDE WASH) 5 % external liquid Apply topically daily as needed 80 g 0     lisinopril (ZESTRIL) 30 MG tablet TAKE ONE TABLET BY MOUTH ONCE DAILY 90 tablet 1     metoprolol succinate ER (TOPROL-XL) 25 MG 24 hr tablet TAKE 1/2 TABLET BY MOUTH DAILY 90 tablet 1     order for DME Equipment being ordered: compression stockings above knee 1 pair 1 Units 1     triamcinolone (KENALOG) 0.1 % ointment Apply sparingly to affected area three times daily for 14 days. 80 g 0       ALLERGIES   No Known Allergies    PAST MEDICAL HISTORY:  Past Medical History:   Diagnosis Date     Coronary artery disease 11/17/2017    Anterolateral STEMI, Single vessel CAD (LAD), s/p PCI     Hypertension        PAST SURGICAL HISTORY:  No past surgical history on file.    FAMILY HISTORY:  Family History   Problem Relation Age of Onset     Coronary Artery Disease Father         bypass     Gastrointestinal Disease Father      Other Cancer Maternal Grandmother      Coronary Artery Disease Paternal Grandmother        SOCIAL HISTORY:  Social History     Socioeconomic History     Marital status: Single     Spouse name: None     Number of children: None     Years of education: None     Highest education level: None   Occupational History     None   Social Needs     Financial resource strain: None     Food insecurity     Worry: None     Inability: None     Transportation needs     Medical: None     Non-medical: None   Tobacco Use     Smoking status: Current Some Day Smoker     Types: Cigarettes     Smokeless tobacco: Never Used     Tobacco comment: 1-2 cigs daily   Substance and Sexual Activity     Alcohol use: Yes     Comment: Off and on-weekends.     Drug use: No     Sexual activity: Yes   Lifestyle     Physical activity     Days per week: None     Minutes per session: None     Stress: None   Relationships      Social connections     Talks on phone: None     Gets together: None     Attends Yarsani service: None     Active member of club or organization: None     Attends meetings of clubs or organizations: None     Relationship status: None     Intimate partner violence     Fear of current or ex partner: None     Emotionally abused: None     Physically abused: None     Forced sexual activity: None   Other Topics Concern     Parent/sibling w/ CABG, MI or angioplasty before 65F 55M? No   Social History Narrative     None       Review of Systems:  Skin:  Negative       Eyes:  Negative      ENT:  Positive for sinus trouble    Respiratory:  Negative       Cardiovascular:    Positive for;chest pain;fatigue    Gastroenterology: Negative      Genitourinary:  Negative      Musculoskeletal:  Negative      Neurologic:  Negative      Psychiatric:  Negative      Heme/Lymph/Imm:  Negative      Endocrine:  Negative        Physical Exam:  Vitals: There were no vitals taken for this visit.    Constitutional:           Skin:             Head:           Eyes:           Lymph:      ENT:           Neck:           Respiratory:            Cardiac:                                                           GI:           Extremities and Muscular Skeletal:                 Neurological:           Psych:           CC  No referring provider defined for this encounter.                Thank you for allowing me to participate in the care of your patient.      Sincerely,     CUONG Knox [facility]    cc:   No referring provider defined for this encounter.

## 2020-08-28 NOTE — PATIENT INSTRUCTIONS
Medication Changes:  None     Recommendations:  1. Check blood pressure at least 1 hour after medications. Call the clinic if your blood pressure is consistently greater than 130/80.   2. Call if having more chest discomfort and we can do stress test     Follow-up:  1. Call to schedule Fasting lab in 2 months (lipid/ALT, BMP)   2. See Dr. Santiago for cardiology follow up at [facility]: Aug 2021. Call in May to schedule.     Cardiology Scheduling~[phone]  Cardiology Clinic RNs~[phone] (Rosibel Resendez RN and Teresita Eastman RN)

## 2020-08-28 NOTE — PROGRESS NOTES
"Liobrio Medellin is a 59 year old male who is being evaluated via a billable telephone visit.      The patient has been notified of following:     \"This telephone visit will be conducted via a call between you and your physician/provider. We have found that certain health care needs can be provided without the need for a physical exam.  This service lets us provide the care you need with a short phone conversation.  If a prescription is necessary we can send it directly to your pharmacy.  If lab work is needed we can place an order for that and you can then stop by our lab to have the test done at a later time.    Telephone visits are billed at different rates depending on your insurance coverage. During this emergency period, for some insurers they may be billed the same as an in-person visit.  Please reach out to your insurance provider with any questions.    If during the course of the call the physician/provider feels a telephone visit is not appropriate, you will not be charged for this service.\"    Patient has given verbal consent for Telephone visit?  Yes    What phone number would you like to be contacted at? [phone]    How would you like to obtain your AVS? Mail a copy    Phone call duration: 10 minutes    [facility]      Cardiology Clinic Progress Note  Liborio Medellin MRN# [id number]   YOB: 1960 Age: 59 year old      Primary Cardiologist:   Dr. Santiago           History of Presenting Illness:      Liborio Medellin is a pleasant 59 year old patient with a past cardiac history significant for   1. CAD   2. Hypertension  3. Hyperlipidemia  Past medical history significant for ongoing tobacco abuse.    In 2017 he presented to the [facility] with a STEMI, peak troponin 12.  Angiogram showed 90% ruptured plaque of the proximal LAD with embolization to the apical LAD, he underwent aspiration and drug eluding stent, he had poor flow in the apical LAD mild disease " of the circumflex and RCA.  Cardiac MRI showed EF 49%, akinesis of the apical inferior and apical segments, delayed enhancement with transmural infarct of the apical inferior wall, also to small areas of hyper enhancement suggesting embolic MI of the LAD territory.  He had small apical thrombus, but this resolved and he was only on a short course of warfarin. Echo June 2018 showed EF 60%, apical akinesis, normal RV, no valve disease.     Patient was last seen by Dr. Santiago in July 2019.  He was doing well from a cardiac standpoint without any anginal symptoms.  He had occasional twinge of chest pain lasting a few seconds occurring randomly.  He had very minimal lower extremity edema which was stable.  Plavix was discontinued as he was 2 years post stenting.  He recommended stress test in 2021.    Pt presents today for annual follow-up.  He reports 2 episodes of chest pain occurring with just light activity and lasting for approximately 5 minutes before resolving spontaneously.  This is described as a chest pressure.  The last episode occurred 3 weeks ago.  He prefers to monitor at this point and if having more chest discomfort we can proceed with stress testing.  He notes intermittent lower extremity edema which is not currently present.  He does not have a way to assess blood pressure at home.  He is agreeable to going in for annual lab work. Patient reports no shortness of breath, PND, orthopnea, presyncope, syncope, heart racing, or palpitations.        Current Cardiac Medications    aspirin 81 MG EC tablet Take 1 tablet (81 mg) by mouth daily    atorvastatin (LIPITOR) 40 MG tablet TAKE ONE TABLET BY MOUTH ONCE DAILY    lisinopril (ZESTRIL) 30 MG tablet TAKE ONE TABLET BY MOUTH ONCE DAILY    metoprolol succinate ER (TOPROL-XL) 25 MG 24 hr tablet TAKE 1/2 TABLET BY MOUTH DAILY                        Assessment and Plan:       Plan  1. Check blood pressure at least 1 hour after medications. Call the clinic if  your blood pressure is consistently greater than 130/80.   2. Call if having more chest discomfort and we can do stress test     Follow-up:  1. Call to schedule annual Fasting lab in 2 months (lipid/ALT, BMP)   2. See Dr. Santiago for cardiology follow up at [facility]: Aug 2021      1. CAD    STEMI 2017 JESSICA x1 to LAD with small apical thrombus resolved with warfarin    2 episodes of chest pain about 1 month ago     Continue statin, aspirin, ACE inhibitor, beta-blocker    Consider stress test in 2021 if not done sooner       2. Hypertension    Not able to assess at home     Continue lisinopril, metoprolol      3. Hyperlipidemia    Last LDL 59 7/2019    Continue atorvastatin 40 mg daily         Thank you for allowing me to participate in this delightful patient's care.      This note was completed in part using Dragon voice recognition software. Although reviewed after completion, some word and grammatical errors may occur.    Petra Mas, APRN CNP, APRN, CNP           Data:   All laboratory data reviewed      HPI and Plan:   See dictation    Orders Placed This Encounter   Procedures     Basic metabolic panel     Lipid Profile     ALT     Follow-Up with Cardiologist       No orders of the defined types were placed in this encounter.      Medications Discontinued During This Encounter   Medication Reason     zolpidem (AMBIEN) 10 MG tablet          Encounter Diagnoses   Name Primary?     Coronary artery disease involving native coronary artery of native heart without angina pectoris Yes     Benign essential hypertension      Hyperlipidemia LDL goal <70        CURRENT MEDICATIONS:  Current Outpatient Medications   Medication Sig Dispense Refill     aspirin 81 MG EC tablet Take 1 tablet (81 mg) by mouth daily 90 tablet 3     atorvastatin (LIPITOR) 40 MG tablet TAKE ONE TABLET BY MOUTH ONCE DAILY 90 tablet 1     benzoyl peroxide (BENZOYL PEROXIDE WASH) 5 % external liquid Apply topically daily as  needed 80 g 0     lisinopril (ZESTRIL) 30 MG tablet TAKE ONE TABLET BY MOUTH ONCE DAILY 90 tablet 1     metoprolol succinate ER (TOPROL-XL) 25 MG 24 hr tablet TAKE 1/2 TABLET BY MOUTH DAILY 90 tablet 1     order for DME Equipment being ordered: compression stockings above knee 1 pair 1 Units 1     triamcinolone (KENALOG) 0.1 % ointment Apply sparingly to affected area three times daily for 14 days. 80 g 0       ALLERGIES   No Known Allergies    PAST MEDICAL HISTORY:  Past Medical History:   Diagnosis Date     Coronary artery disease 11/17/2017    Anterolateral STEMI, Single vessel CAD (LAD), s/p PCI     Hypertension        PAST SURGICAL HISTORY:  No past surgical history on file.    FAMILY HISTORY:  Family History   Problem Relation Age of Onset     Coronary Artery Disease Father         bypass     Gastrointestinal Disease Father      Other Cancer Maternal Grandmother      Coronary Artery Disease Paternal Grandmother        SOCIAL HISTORY:  Social History     Socioeconomic History     Marital status: Single     Spouse name: None     Number of children: None     Years of education: None     Highest education level: None   Occupational History     None   Social Needs     Financial resource strain: None     Food insecurity     Worry: None     Inability: None     Transportation needs     Medical: None     Non-medical: None   Tobacco Use     Smoking status: Current Some Day Smoker     Types: Cigarettes     Smokeless tobacco: Never Used     Tobacco comment: 1-2 cigs daily   Substance and Sexual Activity     Alcohol use: Yes     Comment: Off and on-weekends.     Drug use: No     Sexual activity: Yes   Lifestyle     Physical activity     Days per week: None     Minutes per session: None     Stress: None   Relationships     Social connections     Talks on phone: None     Gets together: None     Attends Restoration service: None     Active member of club or organization: None     Attends meetings of clubs or organizations: None      Relationship status: None     Intimate partner violence     Fear of current or ex partner: None     Emotionally abused: None     Physically abused: None     Forced sexual activity: None   Other Topics Concern     Parent/sibling w/ CABG, MI or angioplasty before 65F 55M? No   Social History Narrative     None       Review of Systems:  Skin:  Negative       Eyes:  Negative      ENT:  Positive for sinus trouble    Respiratory:  Negative       Cardiovascular:    Positive for;chest pain;fatigue    Gastroenterology: Negative      Genitourinary:  Negative      Musculoskeletal:  Negative      Neurologic:  Negative      Psychiatric:  Negative      Heme/Lymph/Imm:  Negative      Endocrine:  Negative        Physical Exam:  Vitals: There were no vitals taken for this visit.    Constitutional:           Skin:             Head:           Eyes:           Lymph:      ENT:           Neck:           Respiratory:            Cardiac:                                                           GI:           Extremities and Muscular Skeletal:                 Neurological:           Psych:           CC  No referring provider defined for this encounter.

## 2020-09-22 ENCOUNTER — OFFICE VISIT (OUTPATIENT)
Dept: FAMILY MEDICINE | Facility: CLINIC | Age: 60
End: 2020-09-22
Payer: COMMERCIAL

## 2020-09-22 ENCOUNTER — TELEPHONE (OUTPATIENT)
Dept: FAMILY MEDICINE | Facility: CLINIC | Age: 60
End: 2020-09-22

## 2020-09-22 VITALS
BODY MASS INDEX: 39.69 KG/M2 | DIASTOLIC BLOOD PRESSURE: 70 MMHG | OXYGEN SATURATION: 94 % | RESPIRATION RATE: 12 BRPM | TEMPERATURE: 96.9 F | HEIGHT: 69 IN | HEART RATE: 66 BPM | WEIGHT: 268 LBS | SYSTOLIC BLOOD PRESSURE: 128 MMHG

## 2020-09-22 DIAGNOSIS — N52.9 ERECTILE DYSFUNCTION, UNSPECIFIED ERECTILE DYSFUNCTION TYPE: ICD-10-CM

## 2020-09-22 DIAGNOSIS — Z00.00 ENCOUNTER FOR ROUTINE ADULT HEALTH EXAMINATION WITHOUT ABNORMAL FINDINGS: Primary | ICD-10-CM

## 2020-09-22 DIAGNOSIS — E78.5 HYPERLIPIDEMIA LDL GOAL <70: ICD-10-CM

## 2020-09-22 DIAGNOSIS — I21.02 ST ELEVATION MYOCARDIAL INFARCTION INVOLVING LEFT ANTERIOR DESCENDING (LAD) CORONARY ARTERY (H): ICD-10-CM

## 2020-09-22 DIAGNOSIS — I10 BENIGN ESSENTIAL HYPERTENSION: ICD-10-CM

## 2020-09-22 DIAGNOSIS — Z23 NEED FOR VACCINATION: ICD-10-CM

## 2020-09-22 DIAGNOSIS — Z23 NEED FOR PROPHYLACTIC VACCINATION AND INOCULATION AGAINST INFLUENZA: ICD-10-CM

## 2020-09-22 DIAGNOSIS — R53.83 OTHER FATIGUE: ICD-10-CM

## 2020-09-22 LAB
ALT SERPL W P-5'-P-CCNC: 64 U/L (ref 0–70)
ANION GAP SERPL CALCULATED.3IONS-SCNC: 3 MMOL/L (ref 3–14)
BUN SERPL-MCNC: 23 MG/DL (ref 7–30)
CALCIUM SERPL-MCNC: 8.9 MG/DL (ref 8.5–10.1)
CHLORIDE SERPL-SCNC: 105 MMOL/L (ref 94–109)
CHOLEST SERPL-MCNC: 156 MG/DL
CO2 SERPL-SCNC: 29 MMOL/L (ref 20–32)
CREAT SERPL-MCNC: 1.23 MG/DL (ref 0.66–1.25)
GFR SERPL CREATININE-BSD FRML MDRD: 63 ML/MIN/{1.73_M2}
GLUCOSE SERPL-MCNC: 110 MG/DL (ref 70–99)
HDLC SERPL-MCNC: 70 MG/DL
LDLC SERPL CALC-MCNC: 66 MG/DL
NONHDLC SERPL-MCNC: 86 MG/DL
POTASSIUM SERPL-SCNC: 4.3 MMOL/L (ref 3.4–5.3)
SODIUM SERPL-SCNC: 137 MMOL/L (ref 133–144)
TRIGL SERPL-MCNC: 98 MG/DL

## 2020-09-22 PROCEDURE — 99213 OFFICE O/P EST LOW 20 MIN: CPT | Mod: 25 | Performed by: FAMILY MEDICINE

## 2020-09-22 PROCEDURE — 90715 TDAP VACCINE 7 YRS/> IM: CPT | Performed by: FAMILY MEDICINE

## 2020-09-22 PROCEDURE — 99396 PREV VISIT EST AGE 40-64: CPT | Mod: 25 | Performed by: FAMILY MEDICINE

## 2020-09-22 PROCEDURE — 36415 COLL VENOUS BLD VENIPUNCTURE: CPT | Performed by: NURSE PRACTITIONER

## 2020-09-22 PROCEDURE — 80048 BASIC METABOLIC PNL TOTAL CA: CPT | Performed by: NURSE PRACTITIONER

## 2020-09-22 PROCEDURE — 84460 ALANINE AMINO (ALT) (SGPT): CPT | Performed by: NURSE PRACTITIONER

## 2020-09-22 PROCEDURE — 90471 IMMUNIZATION ADMIN: CPT | Performed by: FAMILY MEDICINE

## 2020-09-22 PROCEDURE — 90682 RIV4 VACC RECOMBINANT DNA IM: CPT | Performed by: FAMILY MEDICINE

## 2020-09-22 PROCEDURE — 80061 LIPID PANEL: CPT | Performed by: NURSE PRACTITIONER

## 2020-09-22 PROCEDURE — 90472 IMMUNIZATION ADMIN EACH ADD: CPT | Performed by: FAMILY MEDICINE

## 2020-09-22 RX ORDER — SILDENAFIL 50 MG/1
50 TABLET, FILM COATED ORAL DAILY PRN
Qty: 30 TABLET | Refills: 0 | Status: SHIPPED | OUTPATIENT
Start: 2020-09-22 | End: 2021-10-20

## 2020-09-22 RX ORDER — LISINOPRIL 30 MG/1
30 TABLET ORAL DAILY
Qty: 90 TABLET | Refills: 3 | Status: SHIPPED | OUTPATIENT
Start: 2020-09-22 | End: 2021-10-20

## 2020-09-22 RX ORDER — ATORVASTATIN CALCIUM 40 MG/1
40 TABLET, FILM COATED ORAL DAILY
Qty: 90 TABLET | Refills: 3 | Status: SHIPPED | OUTPATIENT
Start: 2020-09-22 | End: 2021-10-20

## 2020-09-22 RX ORDER — METOPROLOL SUCCINATE 25 MG/1
TABLET, EXTENDED RELEASE ORAL
Qty: 90 TABLET | Refills: 3 | Status: SHIPPED | OUTPATIENT
Start: 2020-09-22 | End: 2021-10-20

## 2020-09-22 ASSESSMENT — MIFFLIN-ST. JEOR: SCORE: 2021.02

## 2020-09-22 NOTE — TELEPHONE ENCOUNTER
Central Prior Authorization Team   Phone: [phone]      PA Initiation    Medication: Sildenafil Citrate 50mg tabs-Initiated  Insurance Company: [employer] - Phone [phone] Fax [phone]  Pharmacy Filling the Rx: [facility] [place], [place] - [address]  Filling Pharmacy Phone: [phone]  Filling Pharmacy Fax:    Start Date: 9/22/2020

## 2020-09-22 NOTE — TELEPHONE ENCOUNTER
Prior Authorization Retail Medication Request    Medication/Dose: Sildenafil Citrate 50mg tabs  ICD code (if different than what is on RX):    Previously Tried and Failed:    Rationale:      Insurance Name:  [facility]  Insurance ID:  [id number]      Pharmacy Information (if different than what is on RX)  Name:    Phone:        Thanks,   Sierra Oneill  Certified Pharmacy Technician  [facility]  [phone]

## 2020-09-22 NOTE — PROGRESS NOTES
SUBJECTIVE:   CC: Liborio Medellin is an 59 year old male who presents for preventative health visit.     Patient is a 59-year-old female male who presents today for his annual physical.  About 3 years ago he had a STEMI  with subsequent angiogram showing 90% of ruptured plaque of the proximal LAD and he underwent a drug-eluting stent placement.      He has done well since then and has been on cardiac medication including metoprolol, lisinopril and Lipitor.  He reports some mild chest pain every now and then but no other symptoms reported.  He is requesting refills of his medication today.      He did mention that he still gets very tired during the day he says that he feels like he can take naps during the day.  We talked about the possibility of having sleep apnea and we discussed this in details with the patient and he is open to getting a sleep study.      He also wanted medication for erectile dysfunction that has been ongoing for a few years.  It was emphasized very strongly that he cannot combine nitrates and medication for erectile dysfunction and this combination is lethal.  He says he does not take the nitrate and does not have a prescription for it.      {Split Bill scripting  The purpose of this visit is to discuss your medical history and prevent health problems before you are sick. You may be responsible for a co-pay, coinsurance, or deductible if your visit today includes services such as checking on a sore throat, having an x-ray or lab test, or treating and evaluating a new or existing condition   Patient has been advised of split billing requirements and indicates understanding: Yes  Healthy Habits:    Getting at least 3 servings of Calcium per day:  Yes    Bi-annual eye exam:  NO    Dental care twice a year:  Yes    Sleep apnea or symptoms of sleep apnea:  None    Diet:  Regular (no restrictions)    Frequency of exercise:  None    Duration of exercise:  N/A    Taking medications regularly:   Yes    Barriers to taking medications:  None    Medication side effects:  None    PHQ-2 Total Score:    Additional concerns today:  No    Ability to successfully perform activities of daily living: Yes, no assistance needed  Home safety:  none identified   Hearing impairment: Yes, Difficulty following a conversation in a noisy restaurant or crowded room.        Hyperlipidemia Follow-Up      Are you regularly taking any medication or supplement to lower your cholesterol?   Yes- sttin    Are you having muscle aches or other side effects that you think could be caused by your cholesterol lowering medication?  No    Hypertension Follow-up      Do you check your blood pressure regularly outside of the clinic? No     Are you following a low salt diet? No    Are your blood pressures ever more than 140 on the top number (systolic) OR more   than 90 on the bottom number (diastolic), for example 140/90? No, does not check      Today's PHQ-2 Score:   PHQ-2 ( 1999 Pfizer) 12/4/2019   Q1: Little interest or pleasure in doing things 1   Q2: Feeling down, depressed or hopeless 1   PHQ-2 Score 2       Abuse: Current or Past(Physical, Sexual or Emotional)- No  Do you feel safe in your environment? Yes        Social History     Tobacco Use     Smoking status: Current Some Day Smoker     Types: Cigarettes     Smokeless tobacco: Never Used     Tobacco comment: 1-2 cigs daily   Substance Use Topics     Alcohol use: Yes     Comment: Off and on-weekends.     If you drink alcohol do you typically have >3 drinks per day or >7 drinks per week? No    Alcohol Use 9/22/2020   Prescreen: >3 drinks/day or >7 drinks/week? No       Last PSA: No results found for: PSA    Reviewed orders with patient. Reviewed health maintenance and updated orders accordingly - Yes  Lab work is in process  BP Readings from Last 3 Encounters:   09/22/20 128/70   12/04/19 124/58   07/09/19 127/80    Wt Readings from Last 3 Encounters:   09/22/20 121.6 kg (268 lb)    12/04/19 123.8 kg (273 lb)   07/09/19 118.8 kg (261 lb 12.8 oz)                  Patient Active Problem List   Diagnosis     Cellulitis due to MRSA     Advanced directives, counseling/discussion     Tobacco use disorder     Uncontrolled hypertension     STEMI involving left anterior descending coronary artery (H)     Long-term (current) use of anticoagulants [Z79.01]     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)     Coronary artery disease involving native coronary artery of native heart without angina pectoris     Benign essential hypertension     Hyperlipidemia LDL goal <70     No past surgical history on file.    Social History     Tobacco Use     Smoking status: Current Some Day Smoker     Types: Cigarettes     Smokeless tobacco: Never Used     Tobacco comment: 1-2 cigs daily   Substance Use Topics     Alcohol use: Yes     Comment: Off and on-weekends.     Family History   Problem Relation Age of Onset     Coronary Artery Disease Father         bypass     Gastrointestinal Disease Father      Other Cancer Maternal Grandmother      Coronary Artery Disease Paternal Grandmother          Current Outpatient Medications   Medication Sig Dispense Refill     aspirin 81 MG EC tablet Take 1 tablet (81 mg) by mouth daily 90 tablet 3     atorvastatin (LIPITOR) 40 MG tablet Take 1 tablet (40 mg) by mouth daily 90 tablet 3     lisinopril (ZESTRIL) 30 MG tablet Take 1 tablet (30 mg) by mouth daily 90 tablet 3     metoprolol succinate ER (TOPROL-XL) 25 MG 24 hr tablet Take one daily 90 tablet 3     sildenafil (VIAGRA) 50 MG tablet Take 1 tablet (50 mg) by mouth daily as needed (erectile dysfunction) 30 tablet 0     triamcinolone (KENALOG) 0.1 % ointment Apply sparingly to affected area three times daily for 14 days. 80 g 0     benzoyl peroxide (BENZOYL PEROXIDE WASH) 5 % external liquid Apply topically daily as needed 80 g 0     order for DME Equipment being ordered: compression stockings above knee 1 pair 1 Units 1  "    No Known Allergies    Reviewed and updated as needed this visit by clinical staff  Tobacco  Meds         Reviewed and updated as needed this visit by Provider        Past Medical History:   Diagnosis Date     Coronary artery disease 11/17/2017    Anterolateral STEMI, Single vessel CAD (LAD), s/p PCI     Hypertension       No past surgical history on file.    Review of Systems  CONSTITUTIONAL: NEGATIVE for fever, chills, change in weight  INTEGUMENTARY/SKIN: NEGATIVE for worrisome rashes, moles or lesions  EYES: NEGATIVE for vision changes or irritation  ENT: NEGATIVE for ear, mouth and throat problems  RESP: NEGATIVE for significant cough or SOB  CV: NEGATIVE for chest pain, palpitations or peripheral edema  GI: NEGATIVE for nausea, abdominal pain, heartburn, or change in bowel habits   male: negative for dysuria, hematuria, decreased urinary stream, erectile dysfunction, urethral discharge  MUSCULOSKELETAL: NEGATIVE for significant arthralgias or myalgia  NEURO: NEGATIVE for weakness, dizziness or paresthesias  PSYCHIATRIC: NEGATIVE for changes in mood or affect    OBJECTIVE:   /70   Pulse 66   Temp 96.9  F (36.1  C) (Tympanic)   Resp 12   Ht 1.753 m (5' 9\")   Wt 121.6 kg (268 lb)   SpO2 94%   BMI 39.58 kg/m      Physical Exam  GENERAL: healthy, alert and no distress  EYES: Eyes grossly normal to inspection, PERRL and conjunctivae and sclerae normal  HENT: ear canals and TM's normal, nose and mouth without ulcers or lesions  NECK: no adenopathy, no asymmetry, masses, or scars and thyroid normal to palpation  RESP: lungs clear to auscultation - no rales, rhonchi or wheezes  CV: regular rate and rhythm, normal S1 S2, no S3 or S4, no murmur, click or rub, no peripheral edema and peripheral pulses strong  ABDOMEN: soft, nontender, no hepatosplenomegaly, no masses and bowel sounds normal  MS: no gross musculoskeletal defects noted, no edema  SKIN: no suspicious lesions or rashes  PSYCH: mentation " appears normal, affect normal/bright    Diagnostic Test Results:  Pending.    ASSESSMENT/PLAN:   (Z00.00) Encounter for routine adult health examination without abnormal findings  (primary encounter diagnosis)  Comment: 59 yr old male here for his annual physical. Recommend that he try to lose some weight. Immunizations were updated.  Plan: Emphasized healthy lifesyle.    (I21.02) ST elevation myocardial infarction involving left anterior descending (LAD) coronary artery (H)  Comment: medication faxed.   Plan: atorvastatin (LIPITOR) 40 MG tablet, lisinopril        (ZESTRIL) 30 MG tablet, metoprolol succinate ER        (TOPROL-XL) 25 MG 24 hr tablet, OFFICE/OUTPT         VISIT,EST,LEVL IV            (Z23) Need for prophylactic vaccination and inoculation against influenza  Comment: Flu shot updated.   Plan: INFLUENZA QUAD, RECOMBINANT, P-FREE (RIV4)         (FLUBLOCK) [06119], Vaccine Administration,         Initial [61771], Vaccine Administration, Each         Additional [03759], OFFICE/OUTPT VISIT,EST,LEVL        IV            (Z23) Need for vaccination  Comment: Immunization updated.   Plan: TDAP VACCINE (ADACEL) [95064.002], OFFICE/OUTPT        VISIT,EST,LEVL IV      (N52.9) Erectile dysfunction, unspecified erectile dysfunction type  Comment: asked not to combine this with nitrates.   Plan: sildenafil (VIAGRA) 50 MG tablet, OFFICE/OUTPT         VISIT,EST,LEVL IV      (R53.83) Other fatigue  Comment: sleep study ordered.   Plan: SLEEP EVALUATION & MANAGEMENT REFERRAL - [facility]          [phone] (Age 15 and up), OFFICE/OUTPT         VISIT,EST,LEVL IV            (E78.5) Hyperlipidemia LDL goal <70  Comment: .Patient will be notified of results  Plan: OFFICE/OUTPT VISIT,EST,LEVL IV      (I10) Benign essential hypertension  Comment: Blood pressure noted.  Plan: OFFICE/OUTPT VISIT,EST,LEVL IV              Patient has been advised of split billing requirements and  "indicates understanding: Yes  COUNSELING:   Reviewed preventive health counseling, as reflected in patient instructions       Regular exercise       Healthy diet/nutrition       Vision screening    Estimated body mass index is 39.58 kg/m  as calculated from the following:    Height as of this encounter: 1.753 m (5' 9\").    Weight as of this encounter: 121.6 kg (268 lb).     Weight management plan: Discussed healthy diet and exercise guidelines    He reports that he has been smoking cigarettes. He has never used smokeless tobacco.  Tobacco Cessation Action Plan:   Information offered: Patient not interested at this time      Counseling Resources:  ATP IV Guidelines  Pooled Cohorts Equation Calculator  FRAX Risk Assessment  ICSI Preventive Guidelines  Dietary Guidelines for Americans, 2010  USDA's MyPlate  ASA Prophylaxis  Lung CA Screening    Inge Vaughan MD  [facility]  "

## 2020-09-22 NOTE — RESULT ENCOUNTER NOTE
Lipids stable and at goal; electrolytes, ALT and kidney function WNL. Released to patient's MyChart account

## 2020-09-23 NOTE — TELEPHONE ENCOUNTER
PRIOR AUTHORIZATION DENIED    Medication: Sildenafil Citrate 50mg tabs-DENIED    Denial Date: 9/23/2020    Denial Rational: Medication is not covered under patient's pharmacy benefits.        Appeal Information:

## 2021-04-19 ENCOUNTER — IMMUNIZATION (OUTPATIENT)
Dept: FAMILY MEDICINE | Facility: CLINIC | Age: 61
End: 2021-04-19
Payer: COMMERCIAL

## 2021-04-19 PROCEDURE — 0011A PR COVID VAC MODERNA 100 MCG/0.5 ML IM: CPT

## 2021-04-19 PROCEDURE — 91301 PR COVID VAC MODERNA 100 MCG/0.5 ML IM: CPT

## 2021-05-24 ENCOUNTER — IMMUNIZATION (OUTPATIENT)
Dept: FAMILY MEDICINE | Facility: CLINIC | Age: 61
End: 2021-05-24
Attending: FAMILY MEDICINE
Payer: COMMERCIAL

## 2021-05-24 PROCEDURE — 0012A PR COVID VAC MODERNA 100 MCG/0.5 ML IM: CPT

## 2021-05-24 PROCEDURE — 91301 PR COVID VAC MODERNA 100 MCG/0.5 ML IM: CPT

## 2021-09-18 ENCOUNTER — HEALTH MAINTENANCE LETTER (OUTPATIENT)
Age: 61
End: 2021-09-18

## 2021-10-18 DIAGNOSIS — I21.02 ST ELEVATION MYOCARDIAL INFARCTION INVOLVING LEFT ANTERIOR DESCENDING (LAD) CORONARY ARTERY (H): ICD-10-CM

## 2021-10-18 DIAGNOSIS — N52.9 ERECTILE DYSFUNCTION, UNSPECIFIED ERECTILE DYSFUNCTION TYPE: ICD-10-CM

## 2021-10-18 NOTE — LETTER
October 20, 2021      Liborio Medellin     [facility] [id number]        Dear Liborio,     We received a refill request for your Atorvastatin 40mg, Metropolol Succinate ER 25mg, Lisinopril 30mg and Sildenafil 50mg medication.  This medication has been refilled for 30 days as you are due for an office visit for further refills.  Please call [phone] to schedule an appointment.                Sincerely,        Inge Vaughan MD

## 2021-10-19 NOTE — TELEPHONE ENCOUNTER
"Requested Prescriptions   Pending Prescriptions Disp Refills     atorvastatin (LIPITOR) 40 MG tablet [Pharmacy Med Name: ATORVASTATIN CALCIUM 40MG TABS] 90 tablet 3     Sig: TAKE 1 TABLET (40 MG) BY MOUTH DAILY       Statins Protocol Failed - 10/18/2021  2:33 PM        Failed - LDL on file in past 12 months     Recent Labs   Lab Test 09/22/20  1043   LDL 66             Passed - No abnormal creatine kinase in past 12 months     No lab results found.             Passed - Recent (12 mo) or future (30 days) visit within the authorizing provider's specialty     Patient has had an office visit with the authorizing provider or a provider within the authorizing providers department within the previous 12 mos or has a future within next 30 days. See \"Patient Info\" tab in inbasket, or \"Choose Columns\" in Meds & Orders section of the refill encounter.              Passed - Medication is active on med list        Passed - Patient is age 18 or older           metoprolol succinate ER (TOPROL-XL) 25 MG 24 hr tablet [Pharmacy Med Name: METOPROLOL SUCCINATE ER 25MG TB24] 90 tablet 3     Sig: TAKE ONE TABLET BY MOUTH ONCE DAILY       Beta-Blockers Protocol Failed - 10/18/2021  2:33 PM        Failed - Blood pressure under 140/90 in past 12 months     BP Readings from Last 3 Encounters:   09/22/20 128/70   12/04/19 124/58   07/09/19 127/80                 Passed - Patient is age 6 or older        Passed - Recent (12 mo) or future (30 days) visit within the authorizing provider's specialty     Patient has had an office visit with the authorizing provider or a provider within the authorizing providers department within the previous 12 mos or has a future within next 30 days. See \"Patient Info\" tab in inbasket, or \"Choose Columns\" in Meds & Orders section of the refill encounter.              Passed - Medication is active on med list           lisinopril (ZESTRIL) 30 MG tablet [Pharmacy Med Name: LISINOPRIL 30MG TABS] 90 tablet 3     Sig: " "TAKE 1 TABLET (30 MG) BY MOUTH DAILY       ACE Inhibitors (Including Combos) Protocol Failed - 10/18/2021  2:33 PM        Failed - Blood pressure under 140/90 in past 12 months     BP Readings from Last 3 Encounters:   09/22/20 128/70   12/04/19 124/58   07/09/19 127/80                 Failed - Normal serum creatinine on file in past 12 months     Recent Labs   Lab Test 09/22/20  1043   CR 1.23       Ok to refill medication if creatinine is low          Failed - Normal serum potassium on file in past 12 months     Recent Labs   Lab Test 09/22/20  1043   POTASSIUM 4.3             Passed - Recent (12 mo) or future (30 days) visit within the authorizing provider's specialty     Patient has had an office visit with the authorizing provider or a provider within the authorizing providers department within the previous 12 mos or has a future within next 30 days. See \"Patient Info\" tab in inbasket, or \"Choose Columns\" in Meds & Orders section of the refill encounter.              Passed - Medication is active on med list        Passed - Patient is age 18 or older           sildenafil (VIAGRA) 50 MG tablet [Pharmacy Med Name: SILDENAFIL CITRATE 50MG TABS] 30 tablet 0     Sig: TAKE 1 TABLET (50 MG) BY MOUTH DAILY AS NEEDED (ERECTILE DYSFUNCTION)       Erectile Dysfuction Protocol Passed - 10/18/2021  2:33 PM        Passed - Absence of nitrates on medication list        Passed - Absence of Alpha Blockers on Med list        Passed - Recent (12 mo) or future (30 days) visit within the authorizing provider's specialty     Patient has had an office visit with the authorizing provider or a provider within the authorizing providers department within the previous 12 mos or has a future within next 30 days. See \"Patient Info\" tab in inbasket, or \"Choose Columns\" in Meds & Orders section of the refill encounter.              Passed - Medication is active on med list        Passed - Patient is age 18 or older             "

## 2021-10-20 RX ORDER — LISINOPRIL 30 MG/1
30 TABLET ORAL DAILY
Qty: 30 TABLET | Refills: 0 | Status: SHIPPED | OUTPATIENT
Start: 2021-10-20 | End: 2021-12-06

## 2021-10-20 RX ORDER — ATORVASTATIN CALCIUM 40 MG/1
40 TABLET, FILM COATED ORAL DAILY
Qty: 30 TABLET | Refills: 0 | Status: SHIPPED | OUTPATIENT
Start: 2021-10-20 | End: 2021-12-06

## 2021-10-20 RX ORDER — SILDENAFIL 50 MG/1
50 TABLET, FILM COATED ORAL DAILY PRN
Qty: 30 TABLET | Refills: 0 | Status: SHIPPED | OUTPATIENT
Start: 2021-10-20 | End: 2021-12-06

## 2021-10-20 RX ORDER — METOPROLOL SUCCINATE 25 MG/1
TABLET, EXTENDED RELEASE ORAL
Qty: 30 TABLET | Refills: 0 | Status: SHIPPED | OUTPATIENT
Start: 2021-10-20 | End: 2021-12-06

## 2021-10-20 NOTE — TELEPHONE ENCOUNTER
LM for patient to call us back. Letter sent to patient home address for notification.    April Lee on 10/20/2021 at 9:03 AM

## 2021-11-13 ENCOUNTER — HEALTH MAINTENANCE LETTER (OUTPATIENT)
Age: 61
End: 2021-11-13

## 2021-12-02 DIAGNOSIS — I21.02 ST ELEVATION MYOCARDIAL INFARCTION INVOLVING LEFT ANTERIOR DESCENDING (LAD) CORONARY ARTERY (H): ICD-10-CM

## 2021-12-03 NOTE — TELEPHONE ENCOUNTER
"Has appointment scheduled for 12/6/21    Requested Prescriptions   Pending Prescriptions Disp Refills     lisinopril (ZESTRIL) 30 MG tablet [Pharmacy Med Name: LISINOPRIL 30MG TABS] 30 tablet 0     Sig: TAKE 1 TABLET (30 MG) BY MOUTH DAILY       ACE Inhibitors (Including Combos) Protocol Failed - 12/2/2021  2:59 PM        Failed - Blood pressure under 140/90 in past 12 months     BP Readings from Last 3 Encounters:   09/22/20 128/70   12/04/19 124/58   07/09/19 127/80                 Failed - Normal serum creatinine on file in past 12 months     Recent Labs   Lab Test 09/22/20  1043   CR 1.23       Ok to refill medication if creatinine is low          Failed - Normal serum potassium on file in past 12 months     Recent Labs   Lab Test 09/22/20  1043   POTASSIUM 4.3             Passed - Recent (12 mo) or future (30 days) visit within the authorizing provider's specialty     Patient has had an office visit with the authorizing provider or a provider within the authorizing providers department within the previous 12 mos or has a future within next 30 days. See \"Patient Info\" tab in inbasket, or \"Choose Columns\" in Meds & Orders section of the refill encounter.              Passed - Medication is active on med list        Passed - Patient is age 18 or older           atorvastatin (LIPITOR) 40 MG tablet [Pharmacy Med Name: ATORVASTATIN CALCIUM 40MG TABS] 30 tablet 0     Sig: TAKE 1 TABLET (40 MG) BY MOUTH DAILY       Statins Protocol Failed - 12/2/2021  2:59 PM        Failed - LDL on file in past 12 months     Recent Labs   Lab Test 09/22/20  1043   LDL 66             Passed - No abnormal creatine kinase in past 12 months     No lab results found.             Passed - Recent (12 mo) or future (30 days) visit within the authorizing provider's specialty     Patient has had an office visit with the authorizing provider or a provider within the authorizing providers department within the previous 12 mos or has a future " "within next 30 days. See \"Patient Info\" tab in inbasket, or \"Choose Columns\" in Meds & Orders section of the refill encounter.              Passed - Medication is active on med list        Passed - Patient is age 18 or older           metoprolol succinate ER (TOPROL-XL) 25 MG 24 hr tablet [Pharmacy Med Name: METOPROLOL SUCCINATE ER 25MG TB24] 30 tablet 0     Sig: TAKE ONE TABLET BY MOUTH ONCE DAILY       Beta-Blockers Protocol Failed - 12/2/2021  2:59 PM        Failed - Blood pressure under 140/90 in past 12 months     BP Readings from Last 3 Encounters:   09/22/20 128/70   12/04/19 124/58   07/09/19 127/80                 Passed - Patient is age 6 or older        Passed - Recent (12 mo) or future (30 days) visit within the authorizing provider's specialty     Patient has had an office visit with the authorizing provider or a provider within the authorizing providers department within the previous 12 mos or has a future within next 30 days. See \"Patient Info\" tab in inbasket, or \"Choose Columns\" in Meds & Orders section of the refill encounter.              Passed - Medication is active on med list             "

## 2021-12-06 ENCOUNTER — OFFICE VISIT (OUTPATIENT)
Dept: FAMILY MEDICINE | Facility: CLINIC | Age: 61
End: 2021-12-06
Payer: COMMERCIAL

## 2021-12-06 VITALS
OXYGEN SATURATION: 98 % | DIASTOLIC BLOOD PRESSURE: 74 MMHG | BODY MASS INDEX: 39.86 KG/M2 | HEIGHT: 68 IN | SYSTOLIC BLOOD PRESSURE: 130 MMHG | HEART RATE: 59 BPM | TEMPERATURE: 97.6 F | WEIGHT: 263 LBS | RESPIRATION RATE: 16 BRPM

## 2021-12-06 DIAGNOSIS — I21.02 ST ELEVATION MYOCARDIAL INFARCTION INVOLVING LEFT ANTERIOR DESCENDING (LAD) CORONARY ARTERY (H): Primary | ICD-10-CM

## 2021-12-06 DIAGNOSIS — I25.119 CORONARY ARTERY DISEASE INVOLVING NATIVE HEART WITH ANGINA PECTORIS, UNSPECIFIED VESSEL OR LESION TYPE (H): ICD-10-CM

## 2021-12-06 DIAGNOSIS — N52.9 ERECTILE DYSFUNCTION, UNSPECIFIED ERECTILE DYSFUNCTION TYPE: ICD-10-CM

## 2021-12-06 DIAGNOSIS — E66.01 MORBID OBESITY (H): ICD-10-CM

## 2021-12-06 DIAGNOSIS — Z23 NEED FOR PROPHYLACTIC VACCINATION AND INOCULATION AGAINST INFLUENZA: ICD-10-CM

## 2021-12-06 DIAGNOSIS — Z23 HIGH PRIORITY FOR 2019-NCOV VACCINE: ICD-10-CM

## 2021-12-06 LAB
ANION GAP SERPL CALCULATED.3IONS-SCNC: <1 MMOL/L (ref 3–14)
BUN SERPL-MCNC: 24 MG/DL (ref 7–30)
CALCIUM SERPL-MCNC: 8.9 MG/DL (ref 8.5–10.1)
CHLORIDE BLD-SCNC: 108 MMOL/L (ref 94–109)
CHOLEST SERPL-MCNC: 140 MG/DL
CO2 SERPL-SCNC: 32 MMOL/L (ref 20–32)
CREAT SERPL-MCNC: 1.11 MG/DL (ref 0.66–1.25)
FASTING STATUS PATIENT QL REPORTED: YES
GFR SERPL CREATININE-BSD FRML MDRD: 71 ML/MIN/1.73M2
GLUCOSE BLD-MCNC: 87 MG/DL (ref 70–99)
HDLC SERPL-MCNC: 59 MG/DL
LDLC SERPL CALC-MCNC: 69 MG/DL
NONHDLC SERPL-MCNC: 81 MG/DL
POTASSIUM BLD-SCNC: 4.1 MMOL/L (ref 3.4–5.3)
SODIUM SERPL-SCNC: 140 MMOL/L (ref 133–144)
TRIGL SERPL-MCNC: 62 MG/DL

## 2021-12-06 PROCEDURE — 90682 RIV4 VACC RECOMBINANT DNA IM: CPT | Performed by: FAMILY MEDICINE

## 2021-12-06 PROCEDURE — 99214 OFFICE O/P EST MOD 30 MIN: CPT | Mod: 25 | Performed by: FAMILY MEDICINE

## 2021-12-06 PROCEDURE — 91306 COVID-19,PF,MODERNA (18+ YRS BOOSTER .25ML): CPT | Performed by: FAMILY MEDICINE

## 2021-12-06 PROCEDURE — 80048 BASIC METABOLIC PNL TOTAL CA: CPT | Performed by: FAMILY MEDICINE

## 2021-12-06 PROCEDURE — 90471 IMMUNIZATION ADMIN: CPT | Performed by: FAMILY MEDICINE

## 2021-12-06 PROCEDURE — 80061 LIPID PANEL: CPT | Performed by: FAMILY MEDICINE

## 2021-12-06 PROCEDURE — 36415 COLL VENOUS BLD VENIPUNCTURE: CPT | Performed by: FAMILY MEDICINE

## 2021-12-06 PROCEDURE — 0064A COVID-19,PF,MODERNA (18+ YRS BOOSTER .25ML): CPT | Performed by: FAMILY MEDICINE

## 2021-12-06 RX ORDER — LISINOPRIL 30 MG/1
30 TABLET ORAL DAILY
Qty: 90 TABLET | Refills: 3 | Status: SHIPPED | OUTPATIENT
Start: 2021-12-06 | End: 2023-02-07

## 2021-12-06 RX ORDER — SILDENAFIL 50 MG/1
50 TABLET, FILM COATED ORAL DAILY PRN
Qty: 30 TABLET | Refills: 1 | Status: SHIPPED | OUTPATIENT
Start: 2021-12-06 | End: 2022-02-11

## 2021-12-06 RX ORDER — METOPROLOL SUCCINATE 25 MG/1
TABLET, EXTENDED RELEASE ORAL
Qty: 90 TABLET | Refills: 3 | Status: SHIPPED | OUTPATIENT
Start: 2021-12-06 | End: 2023-02-07

## 2021-12-06 RX ORDER — ATORVASTATIN CALCIUM 40 MG/1
40 TABLET, FILM COATED ORAL DAILY
Qty: 90 TABLET | Refills: 3 | Status: SHIPPED | OUTPATIENT
Start: 2021-12-06 | End: 2023-02-07

## 2021-12-06 ASSESSMENT — ENCOUNTER SYMPTOMS
CONSTITUTIONAL NEGATIVE: 1
ALLERGIC/IMMUNOLOGIC NEGATIVE: 1
ENDOCRINE NEGATIVE: 1
PSYCHIATRIC NEGATIVE: 1
CARDIOVASCULAR NEGATIVE: 1
MUSCULOSKELETAL NEGATIVE: 1
HEMATOLOGIC/LYMPHATIC NEGATIVE: 1
GASTROINTESTINAL NEGATIVE: 1
RESPIRATORY NEGATIVE: 1
NEUROLOGICAL NEGATIVE: 1
EYES NEGATIVE: 1

## 2021-12-06 ASSESSMENT — MIFFLIN-ST. JEOR: SCORE: 1977.33

## 2021-12-06 ASSESSMENT — PAIN SCALES - GENERAL: PAINLEVEL: NO PAIN (0)

## 2021-12-06 NOTE — LETTER
December 13, 2021      Lib[address] Medellin  [address]   [facility] [address]        Dear ,    We are writing to inform you of your test results.    Your lab test results were within normal parameters.    Resulted Orders   Lipid panel reflex to direct LDL Fasting   Result Value Ref Range    Cholesterol 140 <200 mg/dL    Triglycerides 62 <150 mg/dL    Direct Measure HDL 59 >=40 mg/dL    LDL Cholesterol Calculated 69 <=100 mg/dL    Non HDL Cholesterol 81 <130 mg/dL    Patient Fasting > 8hrs? Yes     Narrative    Cholesterol  Desirable:  <200 mg/dL    Triglycerides  Normal:  Less than 150 mg/dL  Borderline High:  150-199 mg/dL  High:  200-499 mg/dL  Very High:  Greater than or equal to 500 mg/dL    Direct Measure HDL  Female:  Greater than or equal to 50 mg/dL   Male:  Greater than or equal to 40 mg/dL    LDL Cholesterol  Desirable:  <100mg/dL  Above Desirable:  100-129 mg/dL   Borderline High:  130-159 mg/dL   High:  160-189 mg/dL   Very High:  >= 190 mg/dL    Non HDL Cholesterol  Desirable:  130 mg/dL  Above Desirable:  130-159 mg/dL  Borderline High:  160-189 mg/dL  High:  190-219 mg/dL  Very High:  Greater than or equal to 220 mg/dL   Basic metabolic panel  (Ca, Cl, CO2, Creat, Gluc, K, Na, BUN)   Result Value Ref Range    Sodium 140 133 - 144 mmol/L    Potassium 4.1 3.4 - 5.3 mmol/L    Chloride 108 94 - 109 mmol/L    Carbon Dioxide (CO2) 32 20 - 32 mmol/L    Anion Gap <1 (L) 3 - 14 mmol/L    Urea Nitrogen 24 7 - 30 mg/dL    Creatinine 1.11 0.66 - 1.25 mg/dL    Calcium 8.9 8.5 - 10.1 mg/dL    Glucose 87 70 - 99 mg/dL    GFR Estimate 71 >60 mL/min/1.73m2      Comment:      As of July 11, 2021, eGFR is calculated by the CKD-EPI creatinine equation, without race adjustment. eGFR can be influenced by muscle mass, exercise, and diet. The reported eGFR is an estimation only and is only applicable if the renal function is stable.       If you have any questions or concerns, please call the clinic at the  number listed above.       Sincerely,      Inge Vaughan MD

## 2021-12-06 NOTE — PROGRESS NOTES
Assessment & Plan     ST elevation myocardial infarction involving left anterior descending (LAD) coronary artery (H)  Medication faxed. Labs ordered  - atorvastatin (LIPITOR) 40 MG tablet; Take 1 tablet (40 mg) by mouth daily  - lisinopril (ZESTRIL) 30 MG tablet; Take 1 tablet (30 mg) by mouth daily  - metoprolol succinate ER (TOPROL-XL) 25 MG 24 hr tablet; TAKE ONE TABLET BY MOUTH ONCE DAILY  - aspirin (ASA) 81 MG EC tablet; Take 1 tablet (81 mg) by mouth daily  - Lipid panel reflex to direct LDL Fasting  - Basic metabolic panel  (Ca, Cl, CO2, Creat, Gluc, K, Na, BUN)    High priority for 2019-nCoV vaccine  COVID booster administered.    Need for prophylactic vaccination and inoculation against influenza  - INFLUENZA QUAD, RECOMBINANT, P-FREE (RIV4) (FLUBLOK)  - COVID-19,PF,MODERNA (18+ Yrs BOOSTER .25mL)    Erectile dysfunction, unspecified erectile dysfunction type  Medication faxed.  - sildenafil (VIAGRA) 50 MG tablet; Take 1 tablet (50 mg) by mouth daily as needed (erectile dysfunction)    Morbid obesity (H)  Recommend increase in activity.    Coronary artery disease involving native heart with angina pectoris, unspecified vessel or lesion type (H)  Patient appears to be doing well from this stand point. Patient will be notified of results.   [id number]}     Tobacco Cessation:   reports that he has been smoking cigarettes. He has never used smokeless tobacco.  Tobacco Cessation Action Plan: Information offered: Patient not interested at this time        FUTURE APPOINTMENTS:       - Follow-up visit in one year or sooner as needed.        -Patient will be notified of results.     Return in about 1 year (around 12/6/2022) for Follow up.    Inge Vaughan MD  [facility] SCOTT Capone is a 61 year old who presents for the following health issues     History of Present Illness       Vascular Disease:  He presents for follow up of vascular disease.  He never takes nitroglycerin. He  takes daily aspirin.    He eats 0-1 servings of fruits and vegetables daily.He consumes 1 sweetened beverage(s) daily.He exercises with enough effort to increase his heart rate 9 or less minutes per day.  He exercises with enough effort to increase his heart rate 4 days per week. He is missing 1 dose(s) of medications per week.  He is not taking prescribed medications regularly due to remembering to take.     Patient is a 61-year-old male here for medication refills.  He has a past medical history significant for coronary artery disease, hyperlipidemia, hypertension, obesity,.  He reports no other side effects to his medications.  No acute symptoms reported today.  He has been doing well and is being taking his medications faithfully.  Denies chest pain or shortness of breath.  Hyperlipidemia Follow-Up      Are you regularly taking any medication or supplement to lower your cholesterol?   Yes- atorvastatin    Are you having muscle aches or other side effects that you think could be caused by your cholesterol lowering medication?  No    Hypertension Follow-up      Do you check your blood pressure regularly outside of the clinic? No     Are you following a low salt diet? No    Are your blood pressures ever more than 140 on the top number (systolic) OR more   than 90 on the bottom number (diastolic), for example 140/90? Not checking at home    Vascular Disease Follow-up      How often do you take nitroglycerin? Never    Do you take an aspirin every day? Yes          Review of Systems   Constitutional: Negative.    HENT: Negative.    Eyes: Negative.    Respiratory: Negative.    Cardiovascular: Negative.    Gastrointestinal: Negative.    Endocrine: Negative.    Genitourinary: Negative.    Musculoskeletal: Negative.    Skin: Negative.    Allergic/Immunologic: Negative.    Neurological: Negative.    Hematological: Negative.    Psychiatric/Behavioral: Negative.             Objective    /74 (BP Location: Left arm,  "Patient Position: Sitting, Cuff Size: Adult Large)   Pulse 59   Temp 97.6  F (36.4  C) (Tympanic)   Resp 16   Ht 1.735 m (5' 8.31\")   Wt 119.3 kg (263 lb)   SpO2 98%   BMI 39.63 kg/m    Body mass index is 39.63 kg/m .  Physical Exam  Constitutional:       Appearance: Normal appearance. He is obese.   HENT:      Head: Normocephalic and atraumatic.      Right Ear: Tympanic membrane normal.      Left Ear: Tympanic membrane normal.   Eyes:      Pupils: Pupils are equal, round, and reactive to light.   Cardiovascular:      Rate and Rhythm: Normal rate and regular rhythm.      Pulses: Normal pulses.      Heart sounds: Normal heart sounds.   Pulmonary:      Effort: Pulmonary effort is normal.      Breath sounds: Normal breath sounds.   Abdominal:      General: Abdomen is flat. There is no distension.      Palpations: Abdomen is soft. There is no mass.      Tenderness: There is no abdominal tenderness.   Musculoskeletal:         General: Normal range of motion.      Cervical back: Normal range of motion and neck supple.   Skin:     General: Skin is warm and dry.   Neurological:      Mental Status: He is alert and oriented to person, place, and time.   Psychiatric:         Mood and Affect: Mood normal.         Behavior: Behavior normal.                "

## 2021-12-08 RX ORDER — ATORVASTATIN CALCIUM 40 MG/1
40 TABLET, FILM COATED ORAL DAILY
Qty: 30 TABLET | Refills: 0 | Status: SHIPPED | OUTPATIENT
Start: 2021-12-08 | End: 2023-02-07

## 2021-12-08 RX ORDER — METOPROLOL SUCCINATE 25 MG/1
TABLET, EXTENDED RELEASE ORAL
Qty: 30 TABLET | Refills: 0 | Status: SHIPPED | OUTPATIENT
Start: 2021-12-08 | End: 2023-02-07

## 2021-12-08 RX ORDER — LISINOPRIL 30 MG/1
30 TABLET ORAL DAILY
Qty: 30 TABLET | Refills: 0 | Status: SHIPPED | OUTPATIENT
Start: 2021-12-08 | End: 2023-02-07

## 2022-02-11 DIAGNOSIS — N52.9 ERECTILE DYSFUNCTION, UNSPECIFIED ERECTILE DYSFUNCTION TYPE: ICD-10-CM

## 2022-02-11 RX ORDER — SILDENAFIL 50 MG/1
50 TABLET, FILM COATED ORAL DAILY PRN
Qty: 30 TABLET | Refills: 1 | Status: SHIPPED | OUTPATIENT
Start: 2022-02-11 | End: 2022-05-04

## 2022-04-14 NOTE — PROGRESS NOTES
Patient Quality Outreach    Patient is due for the following:   Colon Cancer Screening -  FIT  Physical    NEXT STEPS:   Schedule a yearly physical    Type of outreach:    Sent letter.      Questions for provider review:    None     Mildred Lopez LPN

## 2022-04-14 NOTE — LETTER
[facility]  [address] [address]  [phone]       April 14, 2022    Lib[address] Medellin  [address]   [place] [address]    Dear Liborio,    We care about your health and have reviewed your health plan and are making recommendations based on this review, to optimize your health.    You are in particular need of attention regarding:  -Colon Cancer Screening  -Wellness (Physical) Visit     We are recommending that you:                                              -schedule a WELLNESS (Physical) APPOINTMENT. We will check fasting labs the same day. You should have nothing to eat except water and meds for 8-10 hours prior.                                                                                                                                            -schedule a COLONOSCOPY to look for colon cancer (due every 10 years or 5 years in higher risk situations.)        Colon cancer is now the second leading cause of cancer-related deaths in the [place] for both men and women and there are over 130,000 new cases and 50,000 deaths per year from colon cancer.  Colonoscopies can prevent 90-95% of these deaths.  Problem lesions can be removed before they ever become cancer.  This test is not only looking for cancer, but also getting rid of precancerious lesions.    If you are under/uninsured, we recommend you contact the [employer] program. [facility] is a free colorectal cancer screening program that provides colonoscopies for eligible under/uninsured [place] men and women. If you are interested in receiving a free colonoscopy, please call [facility] at [phone] (mention code [employer]) to see if you re eligible.     If you do not wish to do a colonoscopy or cannot afford to do one, at this time, there is another option. It is called a FIT test or Fecal Immunochemical Occult Blood Test (take home stool sample kit).  It does not replace the colonoscopy  for colorectal cancer screening, but it can detect hidden bleeding in the lower colon.  It does need to be repeated every year and if a positive result is obtained, you would be referred for a colonoscopy.       If you have completed either one of these tests at another facility, please call with the details of when and where the tests were done and if they were normal or not. Or have the records sent to our clinic so that we can best coordinate your care.                                                                                                                                                   In addition, here is a list of due or overdue Health Maintenance reminders.    Health Maintenance Due   Topic Date Due     ANNUAL REVIEW OF HM ORDERS  Never done     Colorectal Cancer Screening  Never done     HIV Screening  Never done     Hepatitis C Screening  Never done     Zoster (Shingles) Vaccine (1 of 2) Never done     LUNG CANCER SCREENING  Never done     Preventive Care Visit  09/22/2021     PHQ-2 (once per calendar year)  01/01/2022     Discuss Advance Care Planning  01/04/2022       To address the above recommendations, we encourage you to contact us at [phone], via [employer] or by contacting Central Scheduling toll free at [phone] 24 hours a day. They will assist you with finding the most convenient time and location..    Thank you for trusting [facility] and we appreciate the opportunity to serve you.  We look forward to supporting your healthcare needs in the future.    Healthy Regards,    Your [facility] Team

## 2022-05-02 DIAGNOSIS — N52.9 ERECTILE DYSFUNCTION, UNSPECIFIED ERECTILE DYSFUNCTION TYPE: ICD-10-CM

## 2022-05-04 RX ORDER — SILDENAFIL 50 MG/1
50 TABLET, FILM COATED ORAL DAILY PRN
Qty: 30 TABLET | Refills: 1 | Status: SHIPPED | OUTPATIENT
Start: 2022-05-04

## 2022-08-24 ENCOUNTER — APPOINTMENT (OUTPATIENT)
Dept: GENERAL RADIOLOGY | Facility: CLINIC | Age: 62
End: 2022-08-24
Attending: EMERGENCY MEDICINE
Payer: COMMERCIAL

## 2022-08-24 ENCOUNTER — HOSPITAL ENCOUNTER (EMERGENCY)
Facility: CLINIC | Age: 62
Discharge: HOME | End: 2022-08-24
Attending: EMERGENCY MEDICINE | Admitting: EMERGENCY MEDICINE
Payer: COMMERCIAL

## 2022-08-24 ENCOUNTER — PATIENT OUTREACH (OUTPATIENT)
Dept: FAMILY MEDICINE | Facility: CLINIC | Age: 62
End: 2022-08-24

## 2022-08-24 VITALS
OXYGEN SATURATION: 98 % | RESPIRATION RATE: 18 BRPM | HEART RATE: 53 BPM | BODY MASS INDEX: 35.07 KG/M2 | HEIGHT: 70 IN | WEIGHT: 245 LBS | DIASTOLIC BLOOD PRESSURE: 69 MMHG | TEMPERATURE: 98.7 F | SYSTOLIC BLOOD PRESSURE: 140 MMHG

## 2022-08-24 DIAGNOSIS — S51.811A FOREARM LACERATION, RIGHT, INITIAL ENCOUNTER: ICD-10-CM

## 2022-08-24 PROCEDURE — 12032 INTMD RPR S/A/T/EXT 2.6-7.5: CPT | Performed by: EMERGENCY MEDICINE

## 2022-08-24 PROCEDURE — 73090 X-RAY EXAM OF FOREARM: CPT | Mod: RT

## 2022-08-24 PROCEDURE — 99283 EMERGENCY DEPT VISIT LOW MDM: CPT | Performed by: EMERGENCY MEDICINE

## 2022-08-24 PROCEDURE — 99282 EMERGENCY DEPT VISIT SF MDM: CPT | Mod: 25 | Performed by: EMERGENCY MEDICINE

## 2022-08-24 ASSESSMENT — ACTIVITIES OF DAILY LIVING (ADL): ADLS_ACUITY_SCORE: 35

## 2022-08-24 ASSESSMENT — ENCOUNTER SYMPTOMS
NUMBNESS: 0
WOUND: 1

## 2022-08-24 NOTE — TELEPHONE ENCOUNTER
ED / Discharge Outreach Protocol    Patient Contact    Attempt # 1    Was call answered?  No.  Left message on voicemail with information to call me back.    Emiliana Dove RN

## 2022-08-24 NOTE — ED TRIAGE NOTES
Walks into triage with right forearm wrapped in duck tape and blue shop towels. Working tonight about 30 minutes before arrival. Was placing an old pane of non tempered window glass in the dumpster. The glass broke and came back onto the right forearm. There is a 8-10 cm laceration to the back of his forearm. CMS is present and strong distal in the right hand.

## 2022-08-24 NOTE — ED PROVIDER NOTES
History     Chief Complaint   Patient presents with     Laceration     Right forearm     HPI  Liborio Medellin is a 61 year old male who presents for laceration.  Localized to right forearm.  Occurred when a broken piece of glass bounced out of the trash and cut his right forearm as he was throwing it away just prior to arrival. Mild pain to the forearm.  He does not have changes to distal motor or sensation.  He has taken nothing for his pain.  Tetanus immunization status is up to date.  No other injuries.     Allergies:  No Known Allergies    Problem List:    Patient Active Problem List    Diagnosis Date Noted     Morbid obesity (H) 12/06/2021     Priority: Medium     Coronary artery disease involving native heart with angina pectoris, unspecified vessel or lesion type (H) 08/28/2020     Priority: Medium     Benign essential hypertension 08/28/2020     Priority: Medium     Hyperlipidemia LDL goal <70 08/28/2020     Priority: Medium     Long-term (current) use of anticoagulants [Z79.01] 03/01/2018     Priority: Medium     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) 03/01/2018     Priority: Medium     STEMI involving left anterior descending coronary artery (H) 11/14/2017     Priority: Medium     Advanced directives, counseling/discussion 01/04/2017     Priority: Medium     Advance Care Planning 1/4/2017: ACP Review of Chart / Resources Provided:  Reviewed chart for advance care plan.  Liborio Medellin has been provided information and resources to begin or update their advance care plan.  Added by Karen Moran               Tobacco use disorder 01/04/2017     Priority: Medium     Uncontrolled hypertension 01/04/2017     Priority: Medium     Cellulitis due to MRSA 10/14/2016     Priority: Medium        Past Medical History:    Past Medical History:   Diagnosis Date     Coronary artery disease 11/17/2017     Hypertension        Past Surgical History:    No past surgical history on file.    Family  "History:    Family History   Problem Relation Age of Onset     Coronary Artery Disease Father         bypass     Gastrointestinal Disease Father      Other Cancer Maternal Grandmother      Coronary Artery Disease Paternal Grandmother        Social History:  Marital Status:  Single [1]  Social History     Tobacco Use     Smoking status: Current Some Day Smoker     Types: Cigarettes     Smokeless tobacco: Never Used     Tobacco comment: 1-2 cigs daily   Vaping Use     Vaping Use: Never used   Substance Use Topics     Alcohol use: Yes     Comment: Off and on-weekends.     Drug use: No        Medications:    aspirin (ASA) 81 MG EC tablet  atorvastatin (LIPITOR) 40 MG tablet  atorvastatin (LIPITOR) 40 MG tablet  benzoyl peroxide (BENZOYL PEROXIDE WASH) 5 % external liquid  lisinopril (ZESTRIL) 30 MG tablet  lisinopril (ZESTRIL) 30 MG tablet  metoprolol succinate ER (TOPROL-XL) 25 MG 24 hr tablet  metoprolol succinate ER (TOPROL-XL) 25 MG 24 hr tablet  order for DME  sildenafil (VIAGRA) 50 MG tablet  triamcinolone (KENALOG) 0.1 % ointment          Review of Systems   Skin: Positive for wound.   Neurological: Negative for numbness.       Physical Exam   BP: 139/80  Pulse: 61  Temp: 98.7  F (37.1  C)  Resp: 18  Height: 177.8 cm (5' 10\")  Weight: 111.1 kg (245 lb)  SpO2: 95 %      Physical Exam  Constitutional:       General: He is not in acute distress.     Appearance: He is well-developed. He is not diaphoretic.   HENT:      Head: Normocephalic and atraumatic.   Eyes:      General: No scleral icterus.  Musculoskeletal:      Cervical back: Normal range of motion and neck supple.   Skin:     General: Skin is warm and dry.      Findings: No rash.      Comments: Laceration to the back of the right forearm   Neurological:      Mental Status: He is alert and oriented to person, place, and time.      Comments: Right Hand: Sensation intact at 1st dorsal webspace, thenar eminence, and volar surface of 5th digit.  Motor strength " normal for wrist extension, index-thumb opposition, and finger abduction/adduction.          ED Course                 [facility]    -Laceration Repair    Date/Time: 8/24/2022 3:40 AM  Performed by: Buck Solano MD  Authorized by: Buck Solano MD     Risks, benefits and alternatives discussed.      ANESTHESIA (see MAR for exact dosages):     Anesthesia method:  Local infiltration    Local anesthetic:  Bupivacaine 0.5% WITH epi  LACERATION DETAILS     Location:  Shoulder/arm    Shoulder/arm location:  R lower arm    Length (cm):  7    REPAIR TYPE:     Repair type:  Intermediate      EXPLORATION:     Hemostasis achieved with:  Epinephrine    Wound exploration: wound explored through full range of motion and entire depth of wound probed and visualized      TREATMENT:     Area cleansed with:  Soap and water    Amount of cleaning:  Standard    SUBCUTANEOUS REPAIR     Suture size:  4-0    Suture material:  Vicryl    Suture technique:  Simple interrupted    Number of sutures:  4    SKIN REPAIR     Repair method:  Sutures    Suture size:  4-0    Suture material:  Nylon    Suture technique:  Simple interrupted    Number of sutures:  11    APPROXIMATION     Approximation:  Close    POST-PROCEDURE DETAILS     Dressing:  Antibiotic ointment        PROCEDURE    Patient Tolerance:  Patient tolerated the procedure well with no immediate complications                Critical Care time:  none               Results for orders placed or performed during the hospital encounter of 08/24/22 (from the past 24 hour(s))   Radius/Ulna XR, PA & LAT, right    Narrative    EXAM: XR FOREARM RIGHT 2 VIEWS  LOCATION: [facility]  DATE/TIME: 8/24/2022 2:31 AM    INDICATION: laceration to posterior forearm from broken glass, looking for foreign body  COMPARISON: None.      Impression    IMPRESSION: Within normal limits. No fracture.       Medications - No data to  display    Assessments & Plan (with Medical Decision Making)   Patient remained stable.  Based on mechanism of injury, I am concerned for retained foreign body.  Therefore x-ray obtained, images reviewed independently as well as radiology read reviewed, no signs of foreign body.  The laceration was anesthetized, irrigated and closed, see associated procedure note.  Will not prescribe abx.  He will be discharged home.  Will return to the ED 7-10 days for suture removal, sooner for signs of infection.    I have reviewed the nursing notes.    I have reviewed the findings, diagnosis, plan and need for follow up with the patient.       New Prescriptions    No medications on file       Final diagnoses:   Forearm laceration, right, initial encounter       8/24/2022   [facility] EMERGENCY DEPT     Buck Solano MD  08/24/22 [id number]

## 2022-08-24 NOTE — ED NOTES
Right forearm dressed with bacitracin, adaptic, 4x4 and jose de jesus. CMS before and after application.

## 2022-08-25 ENCOUNTER — PATIENT OUTREACH (OUTPATIENT)
Dept: FAMILY MEDICINE | Facility: CLINIC | Age: 62
End: 2022-08-25

## 2022-08-25 NOTE — TELEPHONE ENCOUNTER
"ED / Discharge Outreach Protocol    Patient Contact    Attempt # 1    Was call answered?  Yes.  \"May I please speak with Liborio?\"  Is patient available? Yes; writer began to go through outreach protocol but call was disconnected. Attempted to call back, went straight to voicemail. Message left for patient to return call to clinic.    Amina Velasco RN  [facility]  "

## 2022-10-25 ENCOUNTER — TELEPHONE (OUTPATIENT)
Dept: FAMILY MEDICINE | Facility: CLINIC | Age: 62
End: 2022-10-25

## 2022-10-25 NOTE — TELEPHONE ENCOUNTER
Patient Quality Outreach    Patient is due for the following:   Colon Cancer Screening  Physical Preventive Adult Physical    Next Steps:   Schedule a Adult Preventative    Type of outreach:    Sent letter.    Next Steps:  Reach out within 90 days via Letter.    Max number of attempts reached: No. Will try again in 90 days if patient still on fail list.    Questions for provider review:    None     SARINA Lopez LPN

## 2022-10-25 NOTE — LETTER
October 25, 2022    To  Liborio Medellin  [address]   [facility] [address]      If you have completed these tests at another facility, please call your clinic with the details about when, where, and the results, or have your records sent to our clinic so that we can best coordinate your care.     You are in particular need of attention regarding the following:     Call or MyChart message your clinic to schedule a colonoscopy, schedule/ a FIT Test, or order a Cologuard test. If you are unsure what type of test you need, please call your clinic and speak to clinic staff.   Colon cancer is now the second leading cause of cancer-related deaths in the [place] for both men and women and there are over 130,000 new cases and 50,000 deaths per year from colon cancer. Colonoscopies can prevent 90-95% of these deaths. Problem lesions can be removed before they ever become cancer. This test is not only looking for cancer, but also getting rid of precancerous lesions.   If you are under/uninsured, we recommend you contact the [employer] Program.[employer] is a free colorectal cancer screening program that provides colonoscopies for eligible under/uninsured [place] men and women. If you are interested in receiving a free colonoscopy, please call [employer] at t [phone] (mention code [employer]) to see if you're eligible. Please have them send us the results.   PREVENTATIVE VISIT: Physical    If you have already completed these items, please contact the clinic via phone or   [employer] so your care team can review and update your records. Thank you for   choosing [facility] [place] [facility] for your healthcare needs. For any questions,   concerns, or to schedule an appointment please contact our clinic at [phone].    Healthy Regards,      Your [facility] Care Team

## 2022-11-07 NOTE — PROGRESS NOTES
[facility] Cardiology Progress Note           Assessment and Plan:     Liborio Medellin is a 58yo male patient with PMH notable for HTN, tobacco use who presented to [facility] with STEMI s/p PCI with JESSICA to LAD.      #STEMI s/p JESSICA to pLAD  1x week of CP. RF: HTN, tobacco. During procedure had 1x episode of VF arrest which recovered with 1x shock.   - 180mg brillinta in cath lab - 90mg BID to start today  - reopro gtt to continue for 12 h after intervention  - nitro gtt as needed for pain; goal SBP <150   - holding BB for now given HR 55-60  - atorvastatin 40mg QHS  - s/p 325mg ASA, 81mg QD to start tomorrow   - lipid panel, A1C pending  - telemetry   - cardiac MRI tomorrow.      #HTN - had previously been in diuril, not taking now due to insurance reasons. Will need ACEI - can start tomorrow.     FEN:  - ADAT  - replete lytes K 4, MG 2  PPX: brillinta, asa  Code Status: Full Code         Elysia Gonzalez MD   Cardiology Fellow  [phone]    Patient was seen by and the above plan discussed with Dr. Dunaway.     Subjective:     Patient denies current chest pain, dyspnea on exertion, orthopnea, PND, lightheadedness, or palpitations.           Review of Systems:   A comprehensive review of systems was performed and found to be negative except as described in this note          Medications:     Current Facility-Administered Medications   Medication     [START ON 11/16/2017] lisinopril (PRINIVIL/ZESTRIL) tablet 20 mg     carvedilol (COREG) tablet 12.5 mg     fluticasone (FLONASE) 50 MCG/ACT spray 2 spray     lidocaine 1 % 1 mL     lidocaine (LMX4) kit     sodium chloride (PF) 0.9% PF flush 3 mL     sodium chloride (PF) 0.9% PF flush 3 mL     HOLD: Metformin and metformin containing medications on day of procedure and 48 hours after IV contrast given     nitroGLYcerin (NITROSTAT) sublingual tablet 0.4 mg     naloxone (NARCAN) injection 0.1-0.4 mg     hydrALAZINE (APRESOLINE) injection 10 mg     aspirin EC EC tablet 81 mg  "    atorvastatin (LIPITOR) tablet 40 mg     acetaminophen (TYLENOL) tablet 325-650 mg     HYDROcodone-acetaminophen (NORCO) 5-325 MG per tablet 1-2 tablet     ondansetron (ZOFRAN-ODT) ODT tab 4 mg    Or     ondansetron (ZOFRAN) injection 4 mg     medication instruction - medications to avoid while on abciximab (REOPRO) therapy     No IM or Subcutaneous injections during abciximab (REOPRO) infusion [except Subcutaneous insulin and enoxaparin (LOVENOX)]     ticagrelor (BRILINTA) tablet 90 mg     nitroGLYcerin 50 mg in D5W 250 mL (adult std) infusion               Objective:   Vital signs:  Temp: 98.4  F (36.9  C) Temp src: Oral BP: 138/88   Heart Rate: 64 Resp: 17 SpO2: 99 % O2 Device: None (Room air) Oxygen Delivery: 3 LPM   Weight: 111.6 kg (246 lb 0.5 oz)  Estimated body mass index is 36.33 kg/(m^2) as calculated from the following:    Height as of 1/27/17: 1.753 m (5' 9\").    Weight as of this encounter: 111.6 kg (246 lb 0.5 oz).     Gen: Resting comfortably in bed, NAD   HEENT:AT/ NC, PERRL b/l, EOM grossly intact, MMM   BACK: no CVA tenderness, no midline bony tenderness  PULM/THORAX: Clear to auscultation bilaterally, no rales/rhonchi/wheezes  CV:RRR, S1 and S2 appreciated, no extra heart sounds, murmurs or rub auscultated.  ABD: soft, nontender, nondistended. Normoactive bowel sounds.   EXT: Warm, well perfused. No LE edema noted. TR band in place. No hematoma.   NEURO: AOx4. Speech fluent and articulate. No focal deficits appreciated.           Data:     Results for orders placed or performed during the hospital encounter of 11/14/17 (from the past 24 hour(s))   ISTAT gases elec ica gluc art POCT   Result Value Ref Range    pH Arterial 7.34 (L) 7.35 - 7.45 pH    pCO2 Arterial 43 35 - 45 mm Hg    pO2 Arterial 131 (H) 80 - 105 mm Hg    Bicarbonate Arterial 23 21 - 28 mmol/L    O2 Sat Arterial 99 92 - 100 %    Sodium 137 133 - 144 mmol/L    Potassium 4.3 3.4 - 5.3 mmol/L    Glucose 144 (H) 70 - 99 mg/dL    " Calcium Ionized 4.7 4.4 - 5.2 mg/dL    Hemoglobin 13.3 13.3 - 17.7 g/dL    Hematocrit - POCT 39 (L) 40.0 - 53.0 %PCV   Coronary Angiography Adult Order    Narrative    The results/report for this Cardiology exam is scanned in to [email or web address] and can   be found under the Media Tab in the Chart Review activity.         EKG 12-lead, tracing only    Result Value Ref Range    Interpretation ECG Click View Image link to view waveform and result    Troponin I   Result Value Ref Range    Troponin I ES 2.000 (HH) 0.000 - 0.045 ug/L   Magnesium (1200)   Result Value Ref Range    Magnesium 2.3 1.6 - 2.3 mg/dL   CBC with platelets   Result Value Ref Range    WBC 14.7 (H) 4.0 - 11.0 10e9/L    RBC Count 4.97 4.4 - 5.9 10e12/L    Hemoglobin 14.4 13.3 - 17.7 g/dL    Hematocrit 44.7 40.0 - 53.0 %    MCV 90 78 - 100 fl    MCH 29.0 26.5 - 33.0 pg    MCHC 32.2 31.5 - 36.5 g/dL    RDW 13.8 10.0 - 15.0 %    Platelet Count 243 150 - 450 10e9/L   Basic metabolic panel   Result Value Ref Range    Sodium 139 133 - 144 mmol/L    Potassium 4.0 3.4 - 5.3 mmol/L    Chloride 106 94 - 109 mmol/L    Carbon Dioxide 26 20 - 32 mmol/L    Anion Gap 7 3 - 14 mmol/L    Glucose 132 (H) 70 - 99 mg/dL    Urea Nitrogen 21 7 - 30 mg/dL    Creatinine 1.19 0.66 - 1.25 mg/dL    GFR Estimate 63 >60 mL/min/1.7m2    GFR Estimate If Black 76 >60 mL/min/1.7m2    Calcium 8.4 (L) 8.5 - 10.1 mg/dL   EKG 12-lead, tracing only - STAT   Result Value Ref Range    Interpretation ECG Click View Image link to view waveform and result    Troponin I   Result Value Ref Range    Troponin I ES 10.007 (HH) 0.000 - 0.045 ug/L   Partial thromboplastin time   Result Value Ref Range    PTT 24 22 - 37 sec   Lipid panel   Result Value Ref Range    Cholesterol 140 <200 mg/dL    Triglycerides 65 <150 mg/dL    HDL Cholesterol 50 >39 mg/dL    LDL Cholesterol Calculated 77 <100 mg/dL    Non HDL Cholesterol 90 <130 mg/dL   INR (Reopro)   Result Value Ref Range    INR 1.05 0.86 - 1.14   CBC with  platelets (Reopro)   Result Value Ref Range    WBC 9.9 4.0 - 11.0 10e9/L    RBC Count 4.57 4.4 - 5.9 10e12/L    Hemoglobin 13.1 (L) 13.3 - 17.7 g/dL    Hematocrit 40.8 40.0 - 53.0 %    MCV 89 78 - 100 fl    MCH 28.7 26.5 - 33.0 pg    MCHC 32.1 31.5 - 36.5 g/dL    RDW 14.1 10.0 - 15.0 %    Platelet Count 293 150 - 450 10e9/L   Basic metabolic panel   Result Value Ref Range    Sodium 139 133 - 144 mmol/L    Potassium 3.9 3.4 - 5.3 mmol/L    Chloride 106 94 - 109 mmol/L    Carbon Dioxide 25 20 - 32 mmol/L    Anion Gap 7 3 - 14 mmol/L    Glucose 134 (H) 70 - 99 mg/dL    Urea Nitrogen 20 7 - 30 mg/dL    Creatinine 1.08 0.66 - 1.25 mg/dL    GFR Estimate 70 >60 mL/min/1.7m2    GFR Estimate If Black 85 >60 mL/min/1.7m2    Calcium 8.3 (L) 8.5 - 10.1 mg/dL   EKG 12-lead, tracing only    Result Value Ref Range    Interpretation ECG Click View Image link to view waveform and result    Dermatology IP Consult: Patient to be seen: Routine - within 24 hours; leg rash and lesions, family requesting; Consultant may enter orders: Yes    Narrative    Keyla Peñaloza MD     11/15/2017  5:43 PM  [facility] Inpatient Consult Dermatology Note    Impression/Plan:  1. Chronic stasis dermatitis with acute flare on right lower leg:    Does not appear consistent with cellulitis clinically or by   history.  Recommended triamcinolone 0.1% ointment applied BID   when itch or rash present on lower legs (ask pharmacy to supply   80g tube to have at patient's bedside).  Please rx at discharge   for patient.  Ultimately, compression stocking use daily is the   most successful treatment to prevent flares of dermatitis by   treating swelling. Avoid use of any over the counter antibiotic   ointments on the skin as venous stasis dermatitis is prone to   developing contact allergic reactions.    2.  Crusted papules with peripheral scale on the left thigh:    Suspect these are resolving excoriated folliculitis  Suspect   patient is  "colonized with staph.  To prevent new lesions from   forming, instruct patient at discharge to purchase over the   counter benzoyl peroxide 5% wash and use this daily in the shower   to decrease bacteria counts from the skin.  An alternative method   is to soak in a tub with 1/2 cup nonconcentrated bleach diluted   in full tub for 10-15 minutes 2-3 times weekly.    Patient seen and examined with Dr. Mae.    Thank you for the dermatology consultation. We will sign off.    Please do not hesitate to contact the dermatology   resident/faculty on call for any additional questions or   concerns.     Keyla Peñaloza MD  PGY-4, Dermatology  [phone]    Dermatology Problem List:  1. Stasis dermatitis  2. Recurrent abscesses/folliculitis    Date of Admission: Nov 14, 2017   Encounter Date: 11/15/2017     Reason for Consultation:   \"Leg rash and lesions, patient's family requested.\"    History of Present Illness:  Mr. Liborio Medellin is a 57 year old male with history of   hypertension who was admitted 11/14 for STEMI requiring JESSICA to   the [facility]. Dermatology was consulted for \"leg rash and lesions.\"     Liborio notes that for more than one year he has had an itchy rash   on his lower legs bilaterally.  This seems to come and go.  At   times, it is so itchy that he rips the skin scratching.  Right   now, his right leg is more affected, but his left leg has been   affected in the past.  He thinks he has seen bugs worming   underneath his skin behind his knee and at his wrist in the past.        Liborio notes that he gets severe swelling of his legs with   activity.  He has tried compression stockings once before but   felt they caused skin break down.      Liborio notes that he develops pimple like bumps on his legs that   he picks at because they itch.  They enlarge in size then crust   over and resolve.  He does not use any antibacterial washes at   present.  He does not have a bath tub at home.    No other skin " concerns.    Past Medical History:   Patient Active Problem List   Diagnosis     Cellulitis due to MRSA     Advanced directives, counseling/discussion     Tobacco use disorder     Uncontrolled hypertension     STEMI involving left anterior descending coronary artery (H)     History reviewed. No pertinent past medical history.  No past surgical history on file.      Social History:  Not assessed.    Family History:  Not assessed.    Medications:  Current Facility-Administered Medications   Medication     [START ON 11/16/2017] lisinopril (PRINIVIL/ZESTRIL) tablet 20   mg     carvedilol (COREG) tablet 12.5 mg     fluticasone (VERAMYST) spray 2 spray     lidocaine 1 % 1 mL     lidocaine (LMX4) kit     sodium chloride (PF) 0.9% PF flush 3 mL     sodium chloride (PF) 0.9% PF flush 3 mL     HOLD: Metformin and metformin containing medications on day of   procedure and 48 hours after IV contrast given     nitroGLYcerin (NITROSTAT) sublingual tablet 0.4 mg     naloxone (NARCAN) injection 0.1-0.4 mg     hydrALAZINE (APRESOLINE) injection 10 mg     aspirin EC EC tablet 81 mg     atorvastatin (LIPITOR) tablet 40 mg     acetaminophen (TYLENOL) tablet 325-650 mg     HYDROcodone-acetaminophen (NORCO) 5-325 MG per tablet 1-2   tablet     ondansetron (ZOFRAN-ODT) ODT tab 4 mg    Or     ondansetron (ZOFRAN) injection 4 mg     medication instruction - medications to avoid while on   abciximab (REOPRO) therapy     No IM or Subcutaneous injections during abciximab (REOPRO)   infusion [except Subcutaneous insulin and enoxaparin (LOVENOX)]     ticagrelor (BRILINTA) tablet 90 mg     nitroGLYcerin 50 mg in D5W 250 mL (adult std) infusion          No Known Allergies      Review of Systems:  -Constitutional:  Feeling well, in usual state of health.  -Skin:  As per HPI, no additional concerns.    Physical exam:  Vitals: /70  Temp 97.9  F (36.6  C)  Resp 11  Wt 111.6   kg (246 lb 0.5 oz)  SpO2 98%  BMI 36.33 kg/m2  GEN: This is a  well developed, well-nourished male in no acute   distress, in a pleasant mood.  He appears comfortable and does   not appear acutely ill.  SKIN: Full skin excluding included the head/face, neck, both   arms, chest, back, abdomen, both legs, digits and/or nails,   buttocks, and groin was completed.  -Crusted circular pink papules with surrounding peripheral scale   x2 on the left anterior thigh.  -Pink erythema extending from the proximal right lower legs to   the ankle.  The anterior is slightly more pink than the   posterior.  There is no associated warmth.  -Numerous excoriations on the lower legs bilaterally.  -1-2+ pitting edema bilaterally.  -No other lesions of concern on areas examined.     Laboratory:  Results for orders placed or performed during the hospital   encounter of 11/14/17 (from the past 24 hour(s))   Activated clotting time POCT   Result Value Ref Range    Activated Clot Time 253 (H) 105 - 167 sec   Activated clotting time POCT   Result Value Ref Range    Activated Clot Time 249 (H) 105 - 167 sec   ISTAT gases elec ica gluc art POCT   Result Value Ref Range    pH Arterial 7.34 (L) 7.35 - 7.45 pH    pCO2 Arterial 43 35 - 45 mm Hg    pO2 Arterial 131 (H) 80 - 105 mm Hg    Bicarbonate Arterial 23 21 - 28 mmol/L    O2 Sat Arterial 99 92 - 100 %    Sodium 137 133 - 144 mmol/L    Potassium 4.3 3.4 - 5.3 mmol/L    Glucose 144 (H) 70 - 99 mg/dL    Calcium Ionized 4.7 4.4 - 5.2 mg/dL    Hemoglobin 13.3 13.3 - 17.7 g/dL    Hematocrit - POCT 39 (L) 40.0 - 53.0 %PCV   Coronary Angiography Adult Order    Narrative    The results/report for this Cardiology exam is scanned in to   [employer] and can   be found under the Media Tab in the Chart Review activity.         EKG 12-lead, tracing only    Result Value Ref Range    Interpretation ECG Click View Image link to view waveform and   result    Troponin I   Result Value Ref Range    Troponin I ES 2.000 (HH) 0.000 - 0.045 ug/L   Magnesium (1200)   Result Value Ref  Range    Magnesium 2.3 1.6 - 2.3 mg/dL   CBC with platelets   Result Value Ref Range    WBC 14.7 (H) 4.0 - 11.0 10e9/L    RBC Count 4.97 4.4 - 5.9 10e12/L    Hemoglobin 14.4 13.3 - 17.7 g/dL    Hematocrit 44.7 40.0 - 53.0 %    MCV 90 78 - 100 fl    MCH 29.0 26.5 - 33.0 pg    MCHC 32.2 31.5 - 36.5 g/dL    RDW 13.8 10.0 - 15.0 %    Platelet Count 243 150 - 450 10e9/L   Basic metabolic panel   Result Value Ref Range    Sodium 139 133 - 144 mmol/L    Potassium 4.0 3.4 - 5.3 mmol/L    Chloride 106 94 - 109 mmol/L    Carbon Dioxide 26 20 - 32 mmol/L    Anion Gap 7 3 - 14 mmol/L    Glucose 132 (H) 70 - 99 mg/dL    Urea Nitrogen 21 7 - 30 mg/dL    Creatinine 1.19 0.66 - 1.25 mg/dL    GFR Estimate 63 >60 mL/min/1.7m2    GFR Estimate If Black 76 >60 mL/min/1.7m2    Calcium 8.4 (L) 8.5 - 10.1 mg/dL   EKG 12-lead, tracing only - STAT   Result Value Ref Range    Interpretation ECG Click View Image link to view waveform and   result    Troponin I   Result Value Ref Range    Troponin I ES 10.007 (HH) 0.000 - 0.045 ug/L   Partial thromboplastin time   Result Value Ref Range    PTT 24 22 - 37 sec   Lipid panel   Result Value Ref Range    Cholesterol 140 <200 mg/dL    Triglycerides 65 <150 mg/dL    HDL Cholesterol 50 >39 mg/dL    LDL Cholesterol Calculated 77 <100 mg/dL    Non HDL Cholesterol 90 <130 mg/dL   INR (Reopro)   Result Value Ref Range    INR 1.05 0.86 - 1.14   CBC with platelets (Reopro)   Result Value Ref Range    WBC 9.9 4.0 - 11.0 10e9/L    RBC Count 4.57 4.4 - 5.9 10e12/L    Hemoglobin 13.1 (L) 13.3 - 17.7 g/dL    Hematocrit 40.8 40.0 - 53.0 %    MCV 89 78 - 100 fl    MCH 28.7 26.5 - 33.0 pg    MCHC 32.1 31.5 - 36.5 g/dL    RDW 14.1 10.0 - 15.0 %    Platelet Count 293 150 - 450 10e9/L   Basic metabolic panel   Result Value Ref Range    Sodium 139 133 - 144 mmol/L    Potassium 3.9 3.4 - 5.3 mmol/L    Chloride 106 94 - 109 mmol/L    Carbon Dioxide 25 20 - 32 mmol/L    Anion Gap 7 3 - 14 mmol/L    Glucose 134 (H) 70 - 99  mg/dL    Urea Nitrogen 20 7 - 30 mg/dL    Creatinine 1.08 0.66 - 1.25 mg/dL    GFR Estimate 70 >60 mL/min/1.7m2    GFR Estimate If Black 85 >60 mL/min/1.7m2    Calcium 8.3 (L) 8.5 - 10.1 mg/dL   EKG 12-lead, tracing only    Result Value Ref Range    Interpretation ECG Click View Image link to view waveform and   result        Dr. Mae staffed the patient.    Staff Involved:  Resident(Keyla Peñaloza)/Staff(as above)                EKG results:   Performed today  I have reviewed this patient's EKG with the following interpretation:        Some ST elevation in V4, V5 likely from apical infarct.       All cardiac studies reviewed  All imaging studies reviewed by me.               ATTENDING NOTE:  Patient has been seen and evaluated by me on 11/15/2017.  I have reviewed today's vital signs, medications, labs, and imaging results.  I have reviewed and edited, as necessary, the history, review of systems, physical examination, and assessment and plan.  I have discussed my assessment and plan with the cardiology fellow.  Liborio Medellin is a 57 year old male with risk factor profile (+) HTN, (-) DM, (-) hypercholesterolemia, (+) 1 pack/week x 30 years, (+) fam Hx premature CAD, presented to [facility] yesterday with crescendo chest pain over a week.  He was seen by a nurse practitioner and was noted to have dynamic ST elevation on the ECG.  The recorded ECG did not show ST elevation.  EMS was called and the patient was emergently transferred to [facility] for further management.  The patient underwent coronary angiography and was found to have a right dominant coronary system with single vessel CAD.  There was a 90% ruptured plaque in the proximal LAD with embolization of thrombus into the apical LAD.  The proximal LAD thrombus was aspirated and the residual stenosis was stented with a 4.0x16 mm Synergy.  Symptom onset to hospital arrival was 2 hours.  Door arrival to PCI 20 minutes with RT radial approach  (two attempts).  In spite of aspiration, [place], and low inflation POBA, I was not able to re-establish flow into the apical LAD.  The patient had residual chest discomfort at the completion of the procedure and there was 1 mm ST elevation on the telemetry leads.   Troponin anjali from 2.0 to 10.0.  Follow up troponin.  Exam documented above. Rt radial arteriotomy healing well.  Check cardiac MRI to assess for apical viability.  Start ASA, Ticagrelor, BB, ACE-I, and statin.  ECG this morning shows persistent ST elevation in V4-V5 of 1 mm consistent with apical infarction / injury.  Transfer to telemetry.      Scotty Dunaway MD     Cardiovascular Division       Stable

## 2022-11-19 ENCOUNTER — HEALTH MAINTENANCE LETTER (OUTPATIENT)
Age: 62
End: 2022-11-19

## 2023-01-12 DIAGNOSIS — E78.5 HYPERLIPIDEMIA LDL GOAL <70: ICD-10-CM

## 2023-01-12 DIAGNOSIS — I10 BENIGN ESSENTIAL HYPERTENSION: Primary | ICD-10-CM

## 2023-01-16 RX ORDER — METOPROLOL SUCCINATE 25 MG/1
TABLET, EXTENDED RELEASE ORAL
Qty: 30 TABLET | Refills: 1 | Status: SHIPPED | OUTPATIENT
Start: 2023-01-16 | End: 2023-02-07

## 2023-01-16 RX ORDER — ATORVASTATIN CALCIUM 40 MG/1
TABLET, FILM COATED ORAL
Qty: 30 TABLET | Refills: 1 | Status: SHIPPED | OUTPATIENT
Start: 2023-01-16 | End: 2023-02-07

## 2023-01-16 RX ORDER — LISINOPRIL 30 MG/1
TABLET ORAL
Qty: 30 TABLET | Refills: 1 | Status: SHIPPED | OUTPATIENT
Start: 2023-01-16 | End: 2023-02-07

## 2023-01-16 NOTE — TELEPHONE ENCOUNTER
"Liborio needs an annual wellness check with refills. Thank you!    Pricilla Rojas, RN      Requested Prescriptions   Pending Prescriptions Disp Refills     lisinopril (ZESTRIL) 30 MG tablet [Pharmacy Med Name: LISINOPRIL 30MG TABS] 90 tablet 3     Sig: TAKE 1 TABLET (30 MG) BY MOUTH DAILY       ACE Inhibitors (Including Combos) Protocol Failed - 1/12/2023  8:37 PM        Failed - Blood pressure under 140/90 in past 12 months     BP Readings from Last 3 Encounters:   08/24/22 (!) 140/69   12/06/21 130/74   09/22/20 128/70                 Failed - Recent (12 mo) or future (30 days) visit within the authorizing provider's specialty     Patient has had an office visit with the authorizing provider or a provider within the authorizing providers department within the previous 12 mos or has a future within next 30 days. See \"Patient Info\" tab in inbasket, or \"Choose Columns\" in Meds & Orders section of the refill encounter.              Failed - Normal serum creatinine on file in past 12 months     Recent Labs   Lab Test 12/06/21  1320   CR 1.11       Ok to refill medication if creatinine is low          Failed - Normal serum potassium on file in past 12 months     Recent Labs   Lab Test 12/06/21  1320   POTASSIUM 4.1             Passed - Medication is active on med list        Passed - Patient is age 18 or older           metoprolol succinate ER (TOPROL XL) 25 MG 24 hr tablet [Pharmacy Med Name: METOPROLOL SUCCINATE ER 25MG TB24] 90 tablet 3     Sig: TAKE ONE TABLET BY MOUTH ONCE DAILY       Beta-Blockers Protocol Failed - 1/12/2023  8:37 PM        Failed - Blood pressure under 140/90 in past 12 months     BP Readings from Last 3 Encounters:   08/24/22 (!) 140/69   12/06/21 130/74   09/22/20 128/70                 Failed - Recent (12 mo) or future (30 days) visit within the authorizing provider's specialty     Patient has had an office visit with the authorizing provider or a provider within the authorizing providers " "department within the previous 12 mos or has a future within next 30 days. See \"Patient Info\" tab in inbasket, or \"Choose Columns\" in Meds & Orders section of the refill encounter.              Passed - Patient is age 6 or older        Passed - Medication is active on med list           atorvastatin (LIPITOR) 40 MG tablet [Pharmacy Med Name: ATORVASTATIN CALCIUM 40MG TABS] 90 tablet 3     Sig: TAKE 1 TABLET (40 MG) BY MOUTH DAILY       Statins Protocol Failed - 1/12/2023  8:37 PM        Failed - LDL on file in past 12 months     Recent Labs   Lab Test 12/06/21  1320   LDL 69             Failed - Recent (12 mo) or future (30 days) visit within the authorizing provider's specialty     Patient has had an office visit with the authorizing provider or a provider within the authorizing providers department within the previous 12 mos or has a future within next 30 days. See \"Patient Info\" tab in inbasket, or \"Choose Columns\" in Meds & Orders section of the refill encounter.              Passed - No abnormal creatine kinase in past 12 months     No lab results found.             Passed - Medication is active on med list        Passed - Patient is age 18 or older             "

## 2023-01-16 NOTE — TELEPHONE ENCOUNTER
Patient scheduled a yearly for 2/7/2023 patient needs a short refill to get to appointment.    Mita Ernandez PSC on 1/16/2023 at 1:12 PM

## 2023-02-07 ENCOUNTER — OFFICE VISIT (OUTPATIENT)
Dept: FAMILY MEDICINE | Facility: CLINIC | Age: 63
End: 2023-02-07
Payer: COMMERCIAL

## 2023-02-07 VITALS
HEART RATE: 54 BPM | TEMPERATURE: 97.1 F | WEIGHT: 251 LBS | DIASTOLIC BLOOD PRESSURE: 76 MMHG | SYSTOLIC BLOOD PRESSURE: 122 MMHG | OXYGEN SATURATION: 98 % | HEIGHT: 70 IN | BODY MASS INDEX: 35.93 KG/M2 | RESPIRATION RATE: 16 BRPM

## 2023-02-07 DIAGNOSIS — Z00.00 ROUTINE GENERAL MEDICAL EXAMINATION AT A HEALTH CARE FACILITY: Primary | ICD-10-CM

## 2023-02-07 DIAGNOSIS — I87.2 VENOUS STASIS DERMATITIS OF BOTH LOWER EXTREMITIES: ICD-10-CM

## 2023-02-07 DIAGNOSIS — E78.5 HYPERLIPIDEMIA LDL GOAL <70: ICD-10-CM

## 2023-02-07 DIAGNOSIS — I10 BENIGN ESSENTIAL HYPERTENSION: ICD-10-CM

## 2023-02-07 DIAGNOSIS — Z12.11 SCREEN FOR COLON CANCER: ICD-10-CM

## 2023-02-07 DIAGNOSIS — I25.119 CORONARY ARTERY DISEASE INVOLVING NATIVE HEART WITH ANGINA PECTORIS, UNSPECIFIED VESSEL OR LESION TYPE (H): ICD-10-CM

## 2023-02-07 DIAGNOSIS — E66.01 MORBID OBESITY (H): ICD-10-CM

## 2023-02-07 LAB
ALBUMIN SERPL BCG-MCNC: 4 G/DL (ref 3.5–5.2)
ALP SERPL-CCNC: 96 U/L (ref 40–129)
ALT SERPL W P-5'-P-CCNC: 39 U/L (ref 10–50)
ANION GAP SERPL CALCULATED.3IONS-SCNC: 8 MMOL/L (ref 7–15)
AST SERPL W P-5'-P-CCNC: 26 U/L (ref 10–50)
BILIRUB SERPL-MCNC: 0.2 MG/DL
BUN SERPL-MCNC: 26.8 MG/DL (ref 8–23)
CALCIUM SERPL-MCNC: 9.4 MG/DL (ref 8.8–10.2)
CHLORIDE SERPL-SCNC: 103 MMOL/L (ref 98–107)
CHOLEST SERPL-MCNC: 145 MG/DL
CREAT SERPL-MCNC: 1.31 MG/DL (ref 0.67–1.17)
DEPRECATED HCO3 PLAS-SCNC: 27 MMOL/L (ref 22–29)
GFR SERPL CREATININE-BSD FRML MDRD: 62 ML/MIN/1.73M2
GLUCOSE SERPL-MCNC: 118 MG/DL (ref 70–99)
HDLC SERPL-MCNC: 65 MG/DL
LDLC SERPL CALC-MCNC: 55 MG/DL
NONHDLC SERPL-MCNC: 80 MG/DL
POTASSIUM SERPL-SCNC: 4.9 MMOL/L (ref 3.4–5.3)
PROT SERPL-MCNC: 7.4 G/DL (ref 6.4–8.3)
SODIUM SERPL-SCNC: 138 MMOL/L (ref 136–145)
TRIGL SERPL-MCNC: 123 MG/DL

## 2023-02-07 PROCEDURE — 91313 COVID-19 VACCINE BIVALENT BOOSTER 18+ (MODERNA): CPT | Performed by: FAMILY MEDICINE

## 2023-02-07 PROCEDURE — 99396 PREV VISIT EST AGE 40-64: CPT | Mod: 25 | Performed by: FAMILY MEDICINE

## 2023-02-07 PROCEDURE — 90682 RIV4 VACC RECOMBINANT DNA IM: CPT | Performed by: FAMILY MEDICINE

## 2023-02-07 PROCEDURE — 90471 IMMUNIZATION ADMIN: CPT | Performed by: FAMILY MEDICINE

## 2023-02-07 PROCEDURE — 99214 OFFICE O/P EST MOD 30 MIN: CPT | Mod: 25 | Performed by: FAMILY MEDICINE

## 2023-02-07 PROCEDURE — 80053 COMPREHEN METABOLIC PANEL: CPT | Performed by: FAMILY MEDICINE

## 2023-02-07 PROCEDURE — 36415 COLL VENOUS BLD VENIPUNCTURE: CPT | Performed by: FAMILY MEDICINE

## 2023-02-07 PROCEDURE — 0134A COVID-19 VACCINE BIVALENT BOOSTER 18+ (MODERNA): CPT | Performed by: FAMILY MEDICINE

## 2023-02-07 PROCEDURE — 80061 LIPID PANEL: CPT | Performed by: FAMILY MEDICINE

## 2023-02-07 RX ORDER — TRIAMCINOLONE ACETONIDE 1 MG/G
OINTMENT TOPICAL
Qty: 80 G | Refills: 0 | Status: SHIPPED | OUTPATIENT
Start: 2023-02-07

## 2023-02-07 RX ORDER — LISINOPRIL 30 MG/1
30 TABLET ORAL DAILY
Qty: 90 TABLET | Refills: 3 | Status: SHIPPED | OUTPATIENT
Start: 2023-02-07 | End: 2024-03-25

## 2023-02-07 RX ORDER — METOPROLOL SUCCINATE 25 MG/1
25 TABLET, EXTENDED RELEASE ORAL DAILY
Qty: 90 TABLET | Refills: 3 | Status: SHIPPED | OUTPATIENT
Start: 2023-02-07 | End: 2024-03-25

## 2023-02-07 RX ORDER — ATORVASTATIN CALCIUM 40 MG/1
40 TABLET, FILM COATED ORAL DAILY
Qty: 90 TABLET | Refills: 3 | Status: SHIPPED | OUTPATIENT
Start: 2023-02-07 | End: 2024-03-25

## 2023-02-07 ASSESSMENT — ENCOUNTER SYMPTOMS
DIARRHEA: 0
CHILLS: 0
HEMATOCHEZIA: 0
COUGH: 0
WEAKNESS: 0
ARTHRALGIAS: 0
FEVER: 0
HEADACHES: 0
DYSURIA: 0
HEMATURIA: 0
SORE THROAT: 0
FREQUENCY: 0
HEARTBURN: 0
NAUSEA: 0
DIZZINESS: 0
PALPITATIONS: 0
SHORTNESS OF BREATH: 0
CONSTIPATION: 0
MYALGIAS: 0
PARESTHESIAS: 0
EYE PAIN: 0
JOINT SWELLING: 0
NERVOUS/ANXIOUS: 0
ABDOMINAL PAIN: 0

## 2023-02-07 ASSESSMENT — PAIN SCALES - GENERAL: PAINLEVEL: NO PAIN (0)

## 2023-02-07 NOTE — PROGRESS NOTES
SUBJECTIVE:   CC: Liborio is an 62 year old who presents for preventative health visit.     Patient is a 62-year-old here for his annual physical.  He has a past medical history significant for coronary artery disease, hypertension, hyperlipidemia, chronic dermatitis, obesity.  Medications were refilled.  Labs ordered.  Emphasized weight loss and healthy living.  Reminded patient about dental care.  Immunizations updated.  Patient picked fit test for colon cancer screening.  No other complaints today.  {Split Bill scripting  The purpose of this visit is to discuss your medical history and prevent health problems before you are sick. You may be responsible for a co-pay, coinsurance, or deductible if your visit today includes services such as checking on a sore throat, having an x-ray or lab test, or treating and evaluating a new or existing condition   Patient has been advised of split billing requirements and indicates understanding: Yes  Healthy Habits:     Getting at least 3 servings of Calcium per day:  Yes    Bi-annual eye exam:  NO    Dental care twice a year:  NO    Sleep apnea or symptoms of sleep apnea:  Excessive snoring    Diet:  Regular (no restrictions)    Frequency of exercise:  None    Taking medications regularly:  Yes    PHQ-2 Total Score: 0    Additional concerns today:  Yes    Chief Complaint   Patient presents with     Physical     General physical exam.     Hypertension     Recheck on blood pressure.     Lipids     Recheck on lipid medication.   He is fasting today for labs.     Refill Request     Refill of Triamcinolone ointment. He will use this on his legs as needed.  This helps with his symptoms.      Derm Problem     There is a skin area on the left upper shoulder area to check.  He doesn't really notice any issues with it.  Just wanting this checked.     Imm/Inj     He will do the Covid booster and Flu shot.  Discuss if he is due for a pneumonia injection.        Hyperlipidemia  Follow-Up      Are you regularly taking any medication or supplement to lower your cholesterol?   Yes- Atorvastatin    Are you having muscle aches or other side effects that you think could be caused by your cholesterol lowering medication?  No    Hypertension Follow-up      Do you check your blood pressure regularly outside of the clinic? No     Are you following a low salt diet? No    Are your blood pressures ever more than 140 on the top number (systolic) OR more   than 90 on the bottom number (diastolic), for example 140/90? Not checking.      Today's PHQ-2 Score:   PHQ-2 ( 1999 Pfizer) 2/7/2023   Q1: Little interest or pleasure in doing things 0   Q2: Feeling down, depressed or hopeless 0   PHQ-2 Score 0   PHQ-2 Total Score (12-17 Years)- Positive if 3 or more points; Administer PHQ-A if positive -   Q1: Little interest or pleasure in doing things Not at all   Q2: Feeling down, depressed or hopeless Not at all   PHQ-2 Score 0       Have you ever done Advance Care Planning? (For example, a Health Directive, POLST, or a discussion with a medical provider or your loved ones about your wishes): No, advance care planning information given to patient to review.  Patient plans to discuss their wishes with loved ones or provider.      Social History     Tobacco Use     Smoking status: Some Days     Types: Cigarettes     Smokeless tobacco: Never     Tobacco comments:     1-2 cigs daily   Substance Use Topics     Alcohol use: Yes     Comment: Off and on-weekends.     If you drink alcohol do you typically have >3 drinks per day or >7 drinks per week? No    Alcohol Use 2/7/2023   Prescreen: >3 drinks/day or >7 drinks/week? No   Prescreen: >3 drinks/day or >7 drinks/week? -       Last PSA: No results found for: PSA    Reviewed orders with patient. Reviewed health maintenance and updated orders accordingly - Yes  Lab work is in process  Labs reviewed in EPIC  BP Readings from Last 3 Encounters:   02/07/23 122/76   08/24/22  (!) 140/69   12/06/21 130/74    Wt Readings from Last 3 Encounters:   02/07/23 113.9 kg (251 lb)   08/24/22 111.1 kg (245 lb)   12/06/21 119.3 kg (263 lb)                  Patient Active Problem List   Diagnosis     Cellulitis due to MRSA     Advanced directives, counseling/discussion     Tobacco use disorder     Uncontrolled hypertension     STEMI involving left anterior descending coronary artery (H)     Long-term (current) use of anticoagulants [Z79.01]     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)     Coronary artery disease involving native heart with angina pectoris, unspecified vessel or lesion type (H)     Benign essential hypertension     Hyperlipidemia LDL goal <70     Morbid obesity (H)     No past surgical history on file.    Social History     Tobacco Use     Smoking status: Some Days     Types: Cigarettes     Smokeless tobacco: Never     Tobacco comments:     1-2 cigs daily   Substance Use Topics     Alcohol use: Yes     Comment: Off and on-weekends.     Family History   Problem Relation Age of Onset     Coronary Artery Disease Father         bypass     Gastrointestinal Disease Father      Other Cancer Maternal Grandmother      Coronary Artery Disease Paternal Grandmother          Current Outpatient Medications   Medication Sig Dispense Refill     aspirin (ASA) 81 MG EC tablet Take 1 tablet (81 mg) by mouth daily 90 tablet 3     atorvastatin (LIPITOR) 40 MG tablet Take 1 tablet (40 mg) by mouth daily 90 tablet 3     lisinopril (ZESTRIL) 30 MG tablet Take 1 tablet (30 mg) by mouth daily 90 tablet 3     metoprolol succinate ER (TOPROL XL) 25 MG 24 hr tablet Take 1 tablet (25 mg) by mouth daily 90 tablet 3     sildenafil (VIAGRA) 50 MG tablet TAKE 1 TABLET (50 MG) BY MOUTH DAILY AS NEEDED (ERECTILE DYSFUNCTION) 30 tablet 1     triamcinolone (KENALOG) 0.1 % external ointment Apply sparingly to affected area three times daily for 14 days. 80 g 0     order for DME Equipment being ordered: compression  "stockings above knee 1 pair (Patient not taking: Reported on 2/7/2023) 1 Units 1     No Known Allergies    Reviewed and updated as needed this visit by clinical staff   Tobacco  Allergies  Meds  Problems             Reviewed and updated as needed this visit by Provider     Meds  Problems            Past Medical History:   Diagnosis Date     Coronary artery disease 11/17/2017    Anterolateral STEMI, Single vessel CAD (LAD), s/p PCI     Hypertension       No past surgical history on file.    Review of Systems   Constitutional: Negative for chills and fever.   HENT: Negative for congestion, ear pain, hearing loss and sore throat.    Eyes: Negative for pain and visual disturbance.   Respiratory: Negative for cough and shortness of breath.    Cardiovascular: Positive for peripheral edema. Negative for chest pain and palpitations.   Gastrointestinal: Negative for abdominal pain, constipation, diarrhea, heartburn, hematochezia and nausea.   Genitourinary: Positive for impotence. Negative for dysuria, frequency, genital sores, hematuria, penile discharge and urgency.   Musculoskeletal: Negative for arthralgias, joint swelling and myalgias.   Skin: Negative for rash.   Neurological: Negative for dizziness, weakness, headaches and paresthesias.   Psychiatric/Behavioral: Negative for mood changes. The patient is not nervous/anxious.          OBJECTIVE:   /76 (BP Location: Right arm, Patient Position: Chair, Cuff Size: Adult Large)   Pulse 54   Temp 97.1  F (36.2  C) (Tympanic)   Resp 16   Ht 1.765 m (5' 9.5\")   Wt 113.9 kg (251 lb)   SpO2 98%   BMI 36.53 kg/m      Physical Exam  GENERAL: healthy, alert and no distress  EYES: Eyes grossly normal to inspection, PERRL and conjunctivae and sclerae normal  HENT: ear canals and TM's normal, nose and mouth without ulcers or lesions  NECK: no adenopathy, no asymmetry, masses, or scars and thyroid normal to palpation  RESP: lungs clear to auscultation - no rales, " rhonchi or wheezes  CV: regular rate and rhythm, normal S1 S2, no S3 or S4, no murmur, click or rub, no peripheral edema and peripheral pulses strong  ABDOMEN: soft, nontender, no hepatosplenomegaly, no masses and bowel sounds normal  MS: no gross musculoskeletal defects noted, no edema    Diagnostic Test Results:  Pending     ASSESSMENT/PLAN:       ICD-10-CM    1. Routine general medical examination at a health care facility  Z00.00 62 yr old male here for his annual physical. Patient doing well overall. Recommend weight loss. Also talked to patient about smoking cessation.       2. Screen for colon cancer  Z12.11 Fecal colorectal cancer screen (FIT)  Patient will be notified of results      CANCELED: Colonoscopy Screening  Referral      3. Morbid obesity (H)  E66.01 OFFICE/OUTPT VISIT,EST,LEVL IV      4. Coronary artery disease involving native heart with angina pectoris, unspecified vessel or lesion type (H)  I25.119 OFFICE/OUTPT VISIT,EST,LEVL IV      5. Benign essential hypertension  I10 lisinopril (ZESTRIL) 30 MG tablet  Blood pressure within normal limits. Medication faxed      metoprolol succinate ER (TOPROL XL) 25 MG 24 hr tablet     Comprehensive metabolic panel (BMP + Alb, Alk Phos, ALT, AST, Total. Bili, TP)     OFFICE/OUTPT VISIT,EST,LEVL IV      6. Hyperlipidemia LDL goal <70  E78.5 atorvastatin (LIPITOR) 40 MG tablet     Lipid panel reflex to direct LDL Fasting     OFFICE/OUTPT VISIT,EST,LEVL IV      7. Venous stasis dermatitis of both lower extremities  I87.2 triamcinolone (KENALOG) 0.1 % external ointment     OFFICE/OUTPT VISIT,EST,LEVL IV          Patient has been advised of split billing requirements and indicates understanding: Yes      COUNSELING:   Reviewed preventive health counseling, as reflected in patient instructions       Regular exercise       Healthy diet/nutrition      BMI:   Estimated body mass index is 36.53 kg/m  as calculated from the following:    Height as of this  "encounter: 1.765 m (5' 9.5\").    Weight as of this encounter: 113.9 kg (251 lb).   Weight management plan: Discussed healthy diet and exercise guidelines      He reports that he has been smoking cigarettes. He has never used smokeless tobacco.  Nicotine/Tobacco Cessation Plan:   Information offered: Patient not interested at this time      {Counseling Resources  [place] Preventive Services Task Force  Cholesterol Screening  Health diet/nutrition  Pooled Cohorts Equation Calculator  USDA's MyPlate  ASA Prophylaxis  Lung CA Screening  Osteoporosis prevention/bone health       Inge Vaughan MD  [facility]  "

## 2023-02-16 NOTE — RESULT ENCOUNTER NOTE
Please inform patient that test result was within normal parameters.   Thank you.     Inge Puentes  Your labs came back within normal limits except the kidneys were a bit abnormal. Recommend that you continue to hydrate. Your cholesterol was in really good shape. Way to go !  Thanks  Dr Vaughan

## 2024-01-08 ENCOUNTER — PATIENT OUTREACH (OUTPATIENT)
Dept: CARE COORDINATION | Facility: CLINIC | Age: 64
End: 2024-01-08

## 2024-01-22 ENCOUNTER — PATIENT OUTREACH (OUTPATIENT)
Dept: CARE COORDINATION | Facility: CLINIC | Age: 64
End: 2024-01-22

## 2024-03-23 DIAGNOSIS — E78.5 HYPERLIPIDEMIA LDL GOAL <70: ICD-10-CM

## 2024-03-23 DIAGNOSIS — I10 BENIGN ESSENTIAL HYPERTENSION: ICD-10-CM

## 2024-03-25 RX ORDER — ATORVASTATIN CALCIUM 40 MG/1
40 TABLET, FILM COATED ORAL DAILY
Qty: 30 TABLET | Refills: 1 | Status: SHIPPED | OUTPATIENT
Start: 2024-03-25 | End: 2024-07-02

## 2024-03-25 RX ORDER — METOPROLOL SUCCINATE 25 MG/1
25 TABLET, EXTENDED RELEASE ORAL DAILY
Qty: 30 TABLET | Refills: 3 | Status: SHIPPED | OUTPATIENT
Start: 2024-03-25 | End: 2024-10-04

## 2024-03-25 RX ORDER — LISINOPRIL 30 MG/1
30 TABLET ORAL DAILY
Qty: 30 TABLET | Refills: 3 | Status: SHIPPED | OUTPATIENT
Start: 2024-03-25 | End: 2024-09-20

## 2024-03-25 NOTE — TELEPHONE ENCOUNTER
Requested Prescriptions   Pending Prescriptions Disp Refills    lisinopril (ZESTRIL) 30 MG tablet [Pharmacy Med Name: LISINOPRIL 30MG TABS] 90 tablet 3     Sig: TAKE ONE TABLET BY MOUTH ONCE DAILY       ACE Inhibitors (Including Combos) Protocol Failed - 3/23/2024  8:49 AM        Failed - Blood pressure under 140/90 in past 12 months     BP Readings from Last 3 Encounters:   02/07/23 122/76   08/24/22 (!) 140/69   12/06/21 130/74                 Failed - Has GFR on file in past 12 months and most recent value is normal        Failed - Recent (12 mo) or future (90 days) visit within the authorizing provider's specialty     The patient must have completed an in-person or virtual visit within the past 12 months or has a future visit scheduled within the next 90 days with the authorizing provider s specialty.  Urgent care and e-visits do not quality as an office visit for this protocol.          Failed - Normal serum creatinine on file in past 12 months     Recent Labs   Lab Test 02/07/23  0907   CR 1.31*                 Failed - Normal serum potassium on file in past 12 months     Recent Labs   Lab Test 02/07/23  0907   POTASSIUM 4.9             Passed - Medication is active on med list        Passed - Medication indicated for associated diagnosis     Medication is associated with one or more of the following diagnoses:     Chronic Kidney Disease (CKD)   Coronary Artery Disease (CAD)   Diabetes   Heart Failure (HF)   Hypertension (HTN)   Nephropathy            Passed - Patient is age 18 or older          metoprolol succinate ER (TOPROL XL) 25 MG 24 hr tablet [Pharmacy Med Name: METOPROLOL SUCCINATE ER 25MG TB24] 90 tablet 3     Sig: TAKE ONE TABLET BY MOUTH ONCE DAILY       Beta-Blockers Protocol Failed - 3/23/2024  8:49 AM        Failed - Blood pressure under 140/90 in past 12 months     BP Readings from Last 3 Encounters:   02/07/23 122/76   08/24/22 (!) 140/69   12/06/21 130/74                 Failed - Recent (12  mo) or future (90 days) visit within the authorizing provider's specialty     The patient must have completed an in-person or virtual visit within the past 12 months or has a future visit scheduled within the next 90 days with the authorizing provider s specialty.  Urgent care and e-visits do not quality as an office visit for this protocol.          Passed - Patient is age 6 or older        Passed - Medication is active on med list        Passed - Medication indicated for associated diagnosis     Medication is associated with one or more of the following diagnoses:     Hypertension (HTN)   Atrial fibrillation/flutter   Angina   ASCVD   Migraine   Heart Failure   Tremor   Anxiety   Ocular hypertension   Glaucoma            atorvastatin (LIPITOR) 40 MG tablet [Pharmacy Med Name: ATORVASTATIN CALCIUM 40MG TABS] 90 tablet 3     Sig: TAKE ONE TABLET BY MOUTH ONCE DAILY       Antihyperlipidemic agents Failed - 3/23/2024  8:49 AM        Failed - LDL on file in the past 12 months        Failed - Recent (12 mo) or future (90 days) visit within the authorizing provider's specialty     The patient must have completed an in-person or virtual visit within the past 12 months or has a future visit scheduled within the next 90 days with the authorizing provider s specialty.  Urgent care and e-visits do not quality as an office visit for this protocol.          Passed - Medication is active on med list        Passed - Patient is age 18 years or older

## 2024-04-07 ENCOUNTER — HEALTH MAINTENANCE LETTER (OUTPATIENT)
Age: 64
End: 2024-04-07

## 2024-06-17 PROBLEM — Z71.89 ADVANCED DIRECTIVES, COUNSELING/DISCUSSION: Status: RESOLVED | Noted: 2017-01-04 | Resolved: 2024-06-17

## 2024-07-01 DIAGNOSIS — E78.5 HYPERLIPIDEMIA LDL GOAL <70: ICD-10-CM

## 2024-07-02 RX ORDER — ATORVASTATIN CALCIUM 40 MG/1
40 TABLET, FILM COATED ORAL DAILY
Qty: 90 TABLET | Refills: 1 | Status: SHIPPED | OUTPATIENT
Start: 2024-07-02

## 2024-07-02 NOTE — TELEPHONE ENCOUNTER
Requested Prescriptions   Pending Prescriptions Disp Refills    atorvastatin (LIPITOR) 40 MG tablet [Pharmacy Med Name: ATORVASTATIN CALCIUM 40MG TABS] 30 tablet 1     Sig: TAKE ONE TABLET BY MOUTH ONCE DAILY       Antihyperlipidemic agents Failed - 7/1/2024  3:04 PM        Failed - LDL on file in the past 12 months        Failed - Recent (12 mo) or future (90 days) visit within the authorizing provider's specialty     The patient must have completed an in-person or virtual visit within the past 12 months or has a future visit scheduled within the next 90 days with the authorizing provider s specialty.  Urgent care and e-visits do not quality as an office visit for this protocol.          Passed - Medication is active on med list        Passed - Patient is age 18 years or older

## 2024-08-05 ENCOUNTER — PATIENT OUTREACH (OUTPATIENT)
Dept: CARE COORDINATION | Facility: CLINIC | Age: 64
End: 2024-08-05

## 2024-09-17 DIAGNOSIS — I10 BENIGN ESSENTIAL HYPERTENSION: ICD-10-CM

## 2024-09-18 NOTE — TELEPHONE ENCOUNTER
Requested Prescriptions   Pending Prescriptions Disp Refills    lisinopril (ZESTRIL) 30 MG tablet [Pharmacy Med Name: LISINOPRIL 30MG TABS] 30 tablet 3     Sig: TAKE ONE TABLET BY MOUTH ONCE DAILY       ACE Inhibitors (Including Combos) Protocol Failed - 9/17/2024  4:17 PM        Failed - Blood pressure under 140/90 in past 12 months- Clinicial or Patient Reported     BP Readings from Last 3 Encounters:   02/07/23 122/76   08/24/22 (!) 140/69   12/06/21 130/74       No data recorded            Failed - Recent (12 mo) or future (90 days) visit within the authorizing provider's specialty     The patient must have completed an in-person or virtual visit within the past 12 months or has a future visit scheduled within the next 90 days with the authorizing provider s specialty.  Urgent care and e-visits do not quality as an office visit for this protocol.          Failed - Most recent GFR on file in the past 12 months >30        Failed - Normal serum potassium on file in past 12 months     Recent Labs   Lab Test 02/07/23  0907   POTASSIUM 4.9             Passed - Medication is active on med list        Passed - Medication indicated for associated diagnosis     Medication is associated with one or more of the following diagnoses:     Chronic Kidney Disease (CKD)   Coronary Artery Disease (CAD)   Diabetes   Heart Failure (HF)   Hypertension (HTN)   Nephropathy   History of myocarditis   Tachycardia induced cardiomyopathy   STEMI (ST elevation myocardial infarction)   Spontaneous dissection of coronary artery   Status post percutaneous transluminal coronary angioplasty            Passed - Patient is age 18 or older

## 2024-09-20 RX ORDER — LISINOPRIL 30 MG/1
30 TABLET ORAL DAILY
Qty: 30 TABLET | Refills: 3 | Status: SHIPPED | OUTPATIENT
Start: 2024-09-20

## 2024-10-03 DIAGNOSIS — I10 BENIGN ESSENTIAL HYPERTENSION: ICD-10-CM

## 2024-10-04 RX ORDER — METOPROLOL SUCCINATE 25 MG/1
25 TABLET, EXTENDED RELEASE ORAL DAILY
Qty: 30 TABLET | Refills: 3 | Status: SHIPPED | OUTPATIENT
Start: 2024-10-04 | End: 2024-11-06

## 2024-10-04 NOTE — TELEPHONE ENCOUNTER
Requested Prescriptions   Pending Prescriptions Disp Refills    metoprolol succinate ER (TOPROL XL) 25 MG 24 hr tablet [Pharmacy Med Name: METOPROLOL SUCCINATE ER 25MG TB24] 30 tablet 3     Sig: TAKE ONE TABLET BY MOUTH ONCE DAILY       Beta-Blockers Protocol Failed - 10/3/2024  6:25 PM        Failed - Blood pressure under 140/90 in past 12 months     BP Readings from Last 3 Encounters:   02/07/23 122/76   08/24/22 (!) 140/69   12/06/21 130/74       No data recorded            Failed - Recent (12 mo) or future (90 days) visit within the authorizing provider's specialty     The patient must have completed an in-person or virtual visit within the past 12 months or has a future visit scheduled within the next 90 days with the authorizing provider s specialty.  Urgent care and e-visits do not quality as an office visit for this protocol.          Passed - Patient is age 6 or older        Passed - Medication is active on med list        Passed - Medication indicated for associated diagnosis     Medication is associated with one or more of the following diagnoses:     Hypertension (HTN)   Atrial fibrillation/flutter   Angina   ASCVD   Migraine   Heart Failure   Tremor   Anxiety   Ocular hypertension   Glaucoma   PTSD   Obstructive hypertrophic cardiomyopathy   History of myocarditis   Palpitations   POTS (postural orthostatic tachycardia syndrome)   SVT (supraventricular tachycardia)   Hyperthyroidism   Tachycardia

## 2024-10-09 NOTE — PLAN OF CARE
Problem: Patient Care Overview  Goal: Plan of Care/Patient Progress Review  Outcome: No Change  Shift summary [id number]    Pt doing very well. Pt has been in SR with 1 st degree AV block 60-80's with PVC's, other VSS. Pt eating drinking and voiding. Pt showered tonight. Cream applied to LE. Pt has denied any c/o pain. Pt up ab asia but has mainly rested most of shift. Pt's lungs clear. Pt has 3 PIV SL. POC: Pt is planned for cardiac MRI unfortunately unable to get done today.Possible discharge pending results.    Problem: Cardiac: ACS (Acute Coronary Syndrome) (Adult)  Goal: Signs and Symptoms of Listed Potential Problems Will be Absent, Minimized or Managed (Cardiac: ACS)  Signs and symptoms of listed potential problems will be absent, minimized or managed by discharge/transition of care (reference Cardiac: ACS (Acute Coronary Syndrome) (Adult) CPG).   Outcome: Improving   11/16/17 2114   Cardiac: ACS (Acute Coronary Syndrome)   Problems Assessed (Acute Coronary Syndrome) all   Problems Present (Acute Coronary Syn) situational response          SUBJECTIVE:   Catie Nuñez is a 59 year old female presenting with a chief complaint of runny nose, stuffy nose, cough - non-productive, sore throat, facial pain/pressure, headache, body aches, and fatigue.  Onset of symptoms was 3 week(s) ago.  Course of illness is same.    Severity moderate  Taking decongestant, Excedrin      Past Medical History:   Diagnosis Date    Abnormal Pap smear of cervix 11/07/2019    3/11/21    Anxiety     Arthritis 3 yrs ago    ASCUS with positive high risk HPV 6/2015,09/14/16    atypia on colp, 09/14/16: ASCUS + HR HPV 16 and other.     Cervical high risk HPV (human papillomavirus) test positive 11/07/2019 11/7/21    Depression     Depressive disorder Age 12    Fracture of great toe 02/20/2014    History of scoliosis     Hx of colposcopy with cervical biopsy 10/06/2016    10/06/16: [place] Bx NIL.    MVP (mitral valve prolapse)     Unexplained endometrial cells on cervical Pap smear 06/2015    thin endometrium on US     Current Outpatient Medications   Medication Sig Dispense Refill    ALPRAZolam (XANAX) 0.5 MG tablet TAKE ONE-HALF TABLET BY MOUTH TWICE DAILY AS NEEDED FOR ANXIETY(TO LAST 30 DAYS) 15 tablet 0    Aspirin-Acetaminophen-Caffeine (EXCEDRIN PO) Take by mouth as needed      busPIRone (BUSPAR) 5 MG tablet Take 1 tablet (5 mg) by mouth 2 times daily. 60 tablet 1    estradiol (ESTRACE) 1 MG tablet TAKE 1/2 TABLET BY MOUTH DAILY 90 tablet 0    fluticasone (FLONASE) 50 MCG/ACT nasal spray SHAKE LIQUID AND USE 1 TO 2 SPRAYS IN EACH NOSTRIL DAILY 16 g 5    hydrOXYzine HCl (ATARAX) 25 MG tablet Take 1 tablet (25 mg) by mouth nightly as needed for anxiety (insomina) 90 tablet 1    meclizine (ANTIVERT) 12.5 MG tablet Take 1 tablet (12.5 mg) by mouth 3 times daily as needed for dizziness 30 tablet 0    medroxyPROGESTERone (PROVERA) 2.5 MG tablet TAKE 1 TABLET(2.5 MG) BY MOUTH DAILY 90 tablet 0    pantoprazole (PROTONIX) 40 MG EC tablet Take 1 tablet (40 mg) by mouth daily 30 tablet 0  "   prochlorperazine (COMPAZINE) 10 MG tablet Take 1 tablet (10 mg) by mouth every 6 hours as needed for nausea or vomiting 10 tablet 0    SUMAtriptan (IMITREX) 50 MG tablet TAKE 1 TABLET BY MOUTH AT ONSET OF MIGRAINE. MAY REPEAT IN 2 HOURS, MAX 2 TABLETS IN 24 HOURS. MAX 9 DAYS PER MONTH 18 tablet 3    tiZANidine (ZANAFLEX) 2 MG tablet Take 1 tablet (2 mg) by mouth 3 times daily as needed for muscle spasms (muscle tightness/spasm) 60 tablet 1    traMADol (ULTRAM) 50 MG tablet Take 1 tablet (50 mg) by mouth 2 times daily as needed for severe pain. 30 day supply. Do not take with alprazolam. 50 tablet 0    venlafaxine (EFFEXOR XR) 75 MG 24 hr capsule TAKE 1 CAPSULE(75 MG) BY MOUTH DAILY 90 capsule 0     Social History     Tobacco Use    Smoking status: Never     Passive exposure: Never    Smokeless tobacco: Never   Substance Use Topics    Alcohol use: Not Currently     Comment: occ, rare        ROS:  Review of systems negative except as stated above.    OBJECTIVE:  /82   Pulse 78   Temp 99.3  F (37.4  C) (Temporal)   Resp 16   Ht 1.626 m (5' 4\")   Wt 51.3 kg (113 lb)   LMP 09/01/2016 (LMP Unknown)   SpO2 99%   BMI 19.40 kg/m    GENERAL APPEARANCE: healthy, alert and no distress  EYES: EOMI,  PERRL, conjunctiva clear  HENT: ear canals and TM's normal.  Nose and mouth without ulcers, erythema or lesions  NECK: supple, nontender, no lymphadenopathy  RESP: lungs clear to auscultation - no rales, rhonchi or wheezes  CV: regular rates and rhythm, normal S1 S2, no murmur noted  NEURO: Normal strength and tone, sensory exam grossly normal,  normal speech and mentation  SKIN: no suspicious lesions or rashes      Results for orders placed or performed in visit on 10/09/24   Mononucleosis screen     Status: Normal   Result Value Ref Range    Mononucleosis Screen Negative Negative   CBC with platelets     Status: Abnormal   Result Value Ref Range    WBC Count 3.6 (L) 4.0 - 11.0 10e3/uL    RBC Count 4.25 3.80 - 5.20 " 10e6/uL    Hemoglobin 13.0 11.7 - 15.7 g/dL    Hematocrit 39.0 35.0 - 47.0 %    MCV 92 78 - 100 fL    MCH 30.6 26.5 - 33.0 pg    MCHC 33.3 31.5 - 36.5 g/dL    RDW 12.9 10.0 - 15.0 %    Platelet Count 237 150 - 450 10e3/uL       ASSESSMENT:  (J01.90) Acute sinusitis with symptoms > 10 days  (primary encounter diagnosis)  Plan: Mononucleosis screen, CBC with platelets,         guaiFENesin (MUCINEX) 600 MG 12 hr tablet,         cetirizine (ZYRTEC) 10 MG tablet, saccharomyces        boulardii (FLORASTOR) 250 MG capsule, cefdinir         (OMNICEF) 300 MG capsule, DISCONTINUED:         amoxicillin-clavulanate (AUGMENTIN) 875-125 MG         tablet          Red flags and emergent follow up discussed, and understood by patient  Follow up with PCP if symptoms worsen or fail to improve

## 2024-11-06 ENCOUNTER — OFFICE VISIT (OUTPATIENT)
Dept: FAMILY MEDICINE | Facility: CLINIC | Age: 64
End: 2024-11-06
Payer: COMMERCIAL

## 2024-11-06 VITALS
HEIGHT: 69 IN | RESPIRATION RATE: 14 BRPM | DIASTOLIC BLOOD PRESSURE: 72 MMHG | BODY MASS INDEX: 37.47 KG/M2 | HEART RATE: 61 BPM | OXYGEN SATURATION: 99 % | WEIGHT: 253 LBS | SYSTOLIC BLOOD PRESSURE: 110 MMHG | TEMPERATURE: 97 F

## 2024-11-06 DIAGNOSIS — B35.1 TOENAIL FUNGUS: ICD-10-CM

## 2024-11-06 DIAGNOSIS — N52.9 ERECTILE DYSFUNCTION, UNSPECIFIED ERECTILE DYSFUNCTION TYPE: ICD-10-CM

## 2024-11-06 DIAGNOSIS — I10 BENIGN ESSENTIAL HYPERTENSION: ICD-10-CM

## 2024-11-06 DIAGNOSIS — E66.01 MORBID OBESITY (H): ICD-10-CM

## 2024-11-06 DIAGNOSIS — Z23 HIGH PRIORITY FOR 2019-NCOV VACCINE: ICD-10-CM

## 2024-11-06 DIAGNOSIS — Z23 NEED FOR VACCINATION: ICD-10-CM

## 2024-11-06 DIAGNOSIS — I25.119 CORONARY ARTERY DISEASE INVOLVING NATIVE HEART WITH ANGINA PECTORIS, UNSPECIFIED VESSEL OR LESION TYPE (H): ICD-10-CM

## 2024-11-06 DIAGNOSIS — E78.5 HYPERLIPIDEMIA LDL GOAL <70: ICD-10-CM

## 2024-11-06 DIAGNOSIS — Z00.00 ENCOUNTER FOR ROUTINE ADULT HEALTH EXAMINATION WITHOUT ABNORMAL FINDINGS: Primary | ICD-10-CM

## 2024-11-06 DIAGNOSIS — Z12.11 SCREEN FOR COLON CANCER: ICD-10-CM

## 2024-11-06 DIAGNOSIS — Z23 NEED FOR PROPHYLACTIC VACCINATION AND INOCULATION AGAINST INFLUENZA: ICD-10-CM

## 2024-11-06 LAB
ALBUMIN SERPL BCG-MCNC: 4 G/DL (ref 3.5–5.2)
ALP SERPL-CCNC: 94 U/L (ref 40–150)
ALT SERPL W P-5'-P-CCNC: 34 U/L (ref 0–70)
ANION GAP SERPL CALCULATED.3IONS-SCNC: 6 MMOL/L (ref 7–15)
AST SERPL W P-5'-P-CCNC: 29 U/L (ref 0–45)
BILIRUB SERPL-MCNC: 0.3 MG/DL
BUN SERPL-MCNC: 30.7 MG/DL (ref 8–23)
CALCIUM SERPL-MCNC: 9.2 MG/DL (ref 8.8–10.4)
CHLORIDE SERPL-SCNC: 101 MMOL/L (ref 98–107)
CHOLEST SERPL-MCNC: 125 MG/DL
CREAT SERPL-MCNC: 1.33 MG/DL (ref 0.67–1.17)
EGFRCR SERPLBLD CKD-EPI 2021: 60 ML/MIN/1.73M2
FASTING STATUS PATIENT QL REPORTED: NO
FASTING STATUS PATIENT QL REPORTED: NO
GLUCOSE SERPL-MCNC: 102 MG/DL (ref 70–99)
HCO3 SERPL-SCNC: 29 MMOL/L (ref 22–29)
HDLC SERPL-MCNC: 48 MG/DL
LDLC SERPL CALC-MCNC: 50 MG/DL
NONHDLC SERPL-MCNC: 77 MG/DL
POTASSIUM SERPL-SCNC: 4.7 MMOL/L (ref 3.4–5.3)
PROT SERPL-MCNC: 6.8 G/DL (ref 6.4–8.3)
SODIUM SERPL-SCNC: 136 MMOL/L (ref 135–145)
TRIGL SERPL-MCNC: 135 MG/DL

## 2024-11-06 PROCEDURE — 36415 COLL VENOUS BLD VENIPUNCTURE: CPT | Performed by: FAMILY MEDICINE

## 2024-11-06 PROCEDURE — 80061 LIPID PANEL: CPT | Performed by: FAMILY MEDICINE

## 2024-11-06 PROCEDURE — 80053 COMPREHEN METABOLIC PANEL: CPT | Performed by: FAMILY MEDICINE

## 2024-11-06 PROCEDURE — 99396 PREV VISIT EST AGE 40-64: CPT | Mod: 25 | Performed by: FAMILY MEDICINE

## 2024-11-06 PROCEDURE — 99214 OFFICE O/P EST MOD 30 MIN: CPT | Mod: 25 | Performed by: FAMILY MEDICINE

## 2024-11-06 PROCEDURE — 90480 ADMN SARSCOV2 VAC 1/ONLY CMP: CPT | Performed by: FAMILY MEDICINE

## 2024-11-06 PROCEDURE — 90677 PCV20 VACCINE IM: CPT | Performed by: FAMILY MEDICINE

## 2024-11-06 PROCEDURE — 91320 SARSCV2 VAC 30MCG TRS-SUC IM: CPT | Performed by: FAMILY MEDICINE

## 2024-11-06 PROCEDURE — 90471 IMMUNIZATION ADMIN: CPT | Performed by: FAMILY MEDICINE

## 2024-11-06 PROCEDURE — 90673 RIV3 VACCINE NO PRESERV IM: CPT | Performed by: FAMILY MEDICINE

## 2024-11-06 PROCEDURE — 90472 IMMUNIZATION ADMIN EACH ADD: CPT | Performed by: FAMILY MEDICINE

## 2024-11-06 RX ORDER — METOPROLOL SUCCINATE 25 MG/1
25 TABLET, EXTENDED RELEASE ORAL DAILY
Qty: 90 TABLET | Refills: 3 | Status: SHIPPED | OUTPATIENT
Start: 2024-11-06

## 2024-11-06 RX ORDER — CICLOPIROX 80 MG/ML
SOLUTION TOPICAL
Qty: 6.6 ML | Refills: 11 | Status: SHIPPED | OUTPATIENT
Start: 2024-11-06

## 2024-11-06 RX ORDER — SILDENAFIL 50 MG/1
50 TABLET, FILM COATED ORAL DAILY PRN
Qty: 30 TABLET | Refills: 1 | Status: SHIPPED | OUTPATIENT
Start: 2024-11-06

## 2024-11-06 RX ORDER — ATORVASTATIN CALCIUM 40 MG/1
40 TABLET, FILM COATED ORAL DAILY
Qty: 90 TABLET | Refills: 3 | Status: SHIPPED | OUTPATIENT
Start: 2024-11-06

## 2024-11-06 RX ORDER — LISINOPRIL 30 MG/1
30 TABLET ORAL DAILY
Qty: 90 TABLET | Refills: 3 | Status: SHIPPED | OUTPATIENT
Start: 2024-11-06

## 2024-11-06 SDOH — HEALTH STABILITY: PHYSICAL HEALTH: ON AVERAGE, HOW MANY DAYS PER WEEK DO YOU ENGAGE IN MODERATE TO STRENUOUS EXERCISE (LIKE A BRISK WALK)?: 2 DAYS

## 2024-11-06 ASSESSMENT — PAIN SCALES - GENERAL: PAINLEVEL_OUTOF10: NO PAIN (0)

## 2024-11-06 ASSESSMENT — SOCIAL DETERMINANTS OF HEALTH (SDOH): HOW OFTEN DO YOU GET TOGETHER WITH FRIENDS OR RELATIVES?: ONCE A WEEK

## 2024-11-06 NOTE — LETTER
November 8, 2024      Liborio Medellin  PO   [facility] [id number]        Dear ,    We are writing to inform you of your test results.    Seun Capone  Your labs were good except your kidney test , it was about the same as the last time. Let's continue to monitor. It appears you have chronic kidney disease.  It was very nice seeing you today. Take care and all the best in [place].  Thanks  Dr Vaughan    Resulted Orders   Lipid panel reflex to direct LDL Non-fasting   Result Value Ref Range    Cholesterol 125 <200 mg/dL    Triglycerides 135 <150 mg/dL    Direct Measure HDL 48 >=40 mg/dL    LDL Cholesterol Calculated 50 <100 mg/dL    Non HDL Cholesterol 77 <130 mg/dL    Patient Fasting > 8hrs? No     Narrative    Cholesterol  Desirable: < 200 mg/dL  Borderline High: 200 - 239 mg/dL  High: >= 240 mg/dL    Triglycerides  Normal: < 150 mg/dL  Borderline High: 150 - 199 mg/dL  High: 200-499 mg/dL  Very High: >= 500 mg/dL    Direct Measure HDL  Female: >= 50 mg/dL   Male: >= 40 mg/dL    LDL Cholesterol  Desirable: < 100 mg/dL  Above Desirable: 100 - 129 mg/dL   Borderline High: 130 - 159 mg/dL   High:  160 - 189 mg/dL   Very High: >= 190 mg/dL    Non HDL Cholesterol  Desirable: < 130 mg/dL  Above Desirable: 130 - 159 mg/dL  Borderline High: 160 - 189 mg/dL  High: 190 - 219 mg/dL  Very High: >= 220 mg/dL   Comprehensive metabolic panel (BMP + Alb, Alk Phos, ALT, AST, Total. Bili, TP)   Result Value Ref Range    Sodium 136 135 - 145 mmol/L    Potassium 4.7 3.4 - 5.3 mmol/L    Carbon Dioxide (CO2) 29 22 - 29 mmol/L    Anion Gap 6 (L) 7 - 15 mmol/L    Urea Nitrogen 30.7 (H) 8.0 - 23.0 mg/dL    Creatinine 1.33 (H) 0.67 - 1.17 mg/dL    GFR Estimate 60 (L) >60 mL/min/1.73m2      Comment:      eGFR calculated using 2021 CKD-EPI equation.    Calcium 9.2 8.8 - 10.4 mg/dL      Comment:      Reference intervals for this test were updated on 7/16/2024 to reflect our healthy population more accurately. There may be  differences in the flagging of prior results with similar values performed with this method. Those prior results can be interpreted in the context of the updated reference intervals.    Chloride 101 98 - 107 mmol/L    Glucose 102 (H) 70 - 99 mg/dL    Alkaline Phosphatase 94 40 - 150 U/L    AST 29 0 - 45 U/L    ALT 34 0 - 70 U/L    Protein Total 6.8 6.4 - 8.3 g/dL    Albumin 4.0 3.5 - 5.2 g/dL    Bilirubin Total 0.3 <=1.2 mg/dL    Patient Fasting > 8hrs? No        If you have any questions or concerns, please call the clinic at the number listed above.       Sincerely,      Inge Vaughan MD

## 2024-11-06 NOTE — PROGRESS NOTES
Preventive Care Visit  [facility]  Inge Vaughan MD, Family Medicine  Nov 6, 2024        Liborio was seen today for physical, health maintenance, hypertension, lipids, imm/inj and imm/inj.    Diagnoses and all orders for this visit:    Encounter for routine adult health examination without abnormal findings        -    64 yr old male here for his annual physical. Doing relatively well. No acute complaints today    Coronary artery disease involving native heart with angina pectoris, unspecified vessel or lesion type (H)  -     Lipid panel reflex to direct LDL Non-fasting  -     OFFICE/OUTPT VISIT,EST,LEVL IV  -     No new symptoms.     Hyperlipidemia LDL goal <70  -     atorvastatin (LIPITOR) 40 MG tablet; Take 1 tablet (40 mg) by mouth daily.  -     OFFICE/OUTPT VISIT,EST,LEVL IV  -      Medication faxed, labs ordered. Patient will be notified of results.     Erectile dysfunction, unspecified erectile dysfunction type  -     sildenafil (VIAGRA) 50 MG tablet; Take 1 tablet (50 mg) by mouth daily as needed (erectile dysfunction).  -     OFFICE/OUTPT VISIT,EST,LEVL IV    Benign essential hypertension  -     Comprehensive metabolic panel (BMP + Alb, Alk Phos, ALT, AST, Total. Bili, TP)  -     lisinopril (ZESTRIL) 30 MG tablet; Take 1 tablet (30 mg) by mouth daily.  -     metoprolol succinate ER (TOPROL XL) 25 MG 24 hr tablet; Take 1 tablet (25 mg) by mouth daily.  -     OFFICE/OUTPT VISIT,EST,LEVL IV    Screen for colon cancer  -     Fecal colorectal cancer screen FIT - Future (S+30); Future  -     Fecal colorectal cancer screen FIT - Future (S+30)    Toenail fungus  -     ciclopirox (PENLAC) 8 % external solution; Apply to adjacent skin and affected nails daily.  Remove with alcohol every 7 days, then repeat.  -     OFFICE/OUTPT VISIT,EST,LEVL IV    Need for vaccination    High priority for 2019-nCoV vaccine    Need for prophylactic vaccination and inoculation against  influenza    Morbid obesity (H)  -     OFFICE/OUTPT VISIT,EST,LEVL IV    Other orders  -     Pneumococcal 20 Valent Conjugate (Prevnar 20)  -     COVID-19 12+ (PFIZER)  -     INFLUENZA VACCINE TRIVALENT(FLUBLOK)         Jack Capone is a 64 year old, presenting for the following:  Physical (Would like 3 months of meds), Health Maintenance (Pcv 20 covid flu only today will call insurance about rsv and shingles ), Hypertension, Lipids, Imm/Inj (COVID-19 VACCINE), and Imm/Inj (Flu Shot)        11/6/2024     7:28 AM   Additional Questions   Roomed by ke   Accompanied by self         11/6/2024     7:28 AM   Patient Reported Additional Medications   Patient reports taking the following new medications none          HPI  Patient is a 64 yr old male here for his annual physical. He has a history of hypertension,CAD, anxiety, he mentioned that he has been struggling with some toe nail fungus for a long time. He is hoping to getting medication for this.  He is also requesting refills on all his medications.     Chief Complaint   Patient presents with    Physical     Would like 3 months of meds    Health Maintenance     Pcv 20 covid flu only today will call insurance about rsv and shingles     Hypertension    Lipids    Imm/Inj     COVID-19 VACCINE    Imm/Inj     Flu Shot         Hyperlipidemia Follow-Up    Are you regularly taking any medication or supplement to lower your cholesterol?   Yes- am  Are you having muscle aches or other side effects that you think could be caused by your cholesterol lowering medication?  No    Hypertension Follow-up    Do you check your blood pressure regularly outside of the clinic? Yes   Are you following a low salt diet? No  Are your blood pressures ever more than 140 on the top number (systolic) OR more   than 90 on the bottom number (diastolic), for example 140/90? No    Health Care Directive  Patient does not have a Health Care Directive: Discussed advance care planning with  patient; information given to patient to review.      11/6/2024   General Health   How would you rate your overall physical health? Good   Feel stress (tense, anxious, or unable to sleep) Not at all            11/6/2024   Nutrition   Three or more servings of calcium each day? Yes   Diet: I don't know   How many servings of fruit and vegetables per day? (!) 0-1   How many sweetened beverages each day? 0-1            11/6/2024   Exercise   Days per week of moderate/strenous exercise 2 days      (!) EXERCISE CONCERN      11/6/2024   Social Factors   Frequency of gathering with friends or relatives Once a week   Worry food won't last until get money to buy more No   Food not last or not have enough money for food? No   Do you have housing? (Housing is defined as stable permanent housing and does not include staying ouside in a car, in a tent, in an abandoned building, in an overnight shelter, or couch-surfing.) Yes   Are you worried about losing your housing? No   Lack of transportation? No   Unable to get utilities (heat,electricity)? No            11/6/2024   Fall Risk   Fallen 2 or more times in the past year? No    Trouble with walking or balance? No        Patient-reported          11/6/2024   Dental   Dentist two times every year? (!) NO            11/6/2024   TB Screening   Were you born outside of the [place]? No            Today's PHQ-2 Score:       11/6/2024     7:25 AM   PHQ-2 ( 1999 Pfizer)   Q1: Little interest or pleasure in doing things 0    Q2: Feeling down, depressed or hopeless 0    PHQ-2 Score 0    Q1: Little interest or pleasure in doing things Not at all   Q2: Feeling down, depressed or hopeless Not at all   PHQ-2 Score 0       Patient-reported           11/6/2024   Substance Use   Alcohol more than 3/day or more than 7/wk No   Do you use any other substances recreationally? (!) CANNABIS PRODUCTS        Social History     Tobacco Use    Smoking status: Some Days     Types: Cigarettes    Smokeless  "tobacco: Never    Tobacco comments:     1-2 cigs daily   Vaping Use    Vaping status: Never Used   Substance Use Topics    Alcohol use: Yes     Comment: Off and on-weekends.    Drug use: No             11/6/2024   One time HIV Screening   Previous HIV test? No          11/6/2024   STI Screening   New sexual partner(s) since last STI/HIV test? No      Last PSA: No results found for: \"PSA\"  ASCVD Risk   The ASCVD Risk score (Nicole BRADEN, et al., 2019) failed to calculate for the following reasons:    Risk score cannot be calculated because patient has a medical history suggesting prior/existing ASCVD        Reviewed and updated as needed this visit by Provider     Meds  Problems               Past Medical History:   Diagnosis Date    Coronary artery disease 11/17/2017    Anterolateral STEMI, Single vessel CAD (LAD), s/p PCI    Hypertension      No past surgical history on file.  Lab work is in process  Labs reviewed in EPIC  BP Readings from Last 3 Encounters:   11/06/24 110/72   02/07/23 122/76   08/24/22 (!) 140/69    Wt Readings from Last 3 Encounters:   11/06/24 114.8 kg (253 lb)   02/07/23 113.9 kg (251 lb)   08/24/22 111.1 kg (245 lb)                  Patient Active Problem List   Diagnosis    Cellulitis due to MRSA    Tobacco use disorder    Uncontrolled hypertension    STEMI involving left anterior descending coronary artery (H)    Long-term (current) use of anticoagulants [Z79.01]    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)    Coronary artery disease involving native heart with angina pectoris, unspecified vessel or lesion type (H)    Benign essential hypertension    Hyperlipidemia LDL goal <70    Morbid obesity (H)     No past surgical history on file.    Social History     Tobacco Use    Smoking status: Some Days     Types: Cigarettes    Smokeless tobacco: Never    Tobacco comments:     1-2 cigs daily   Substance Use Topics    Alcohol use: Yes     Comment: Off and on-weekends.     Family " History   Problem Relation Age of Onset    Coronary Artery Disease Father         bypass    Gastrointestinal Disease Father     Other Cancer Maternal Grandmother     Coronary Artery Disease Paternal Grandmother          Current Outpatient Medications   Medication Sig Dispense Refill    aspirin (ASA) 81 MG EC tablet Take 1 tablet (81 mg) by mouth daily 90 tablet 3    atorvastatin (LIPITOR) 40 MG tablet Take 1 tablet (40 mg) by mouth daily. 90 tablet 3    ciclopirox (PENLAC) 8 % external solution Apply to adjacent skin and affected nails daily.  Remove with alcohol every 7 days, then repeat. 6.6 mL 11    lisinopril (ZESTRIL) 30 MG tablet Take 1 tablet (30 mg) by mouth daily. 90 tablet 3    metoprolol succinate ER (TOPROL XL) 25 MG 24 hr tablet Take 1 tablet (25 mg) by mouth daily. 90 tablet 3    order for DME Equipment being ordered: compression stockings above knee 1 pair 1 Units 1    sildenafil (VIAGRA) 50 MG tablet Take 1 tablet (50 mg) by mouth daily as needed (erectile dysfunction). 30 tablet 1    triamcinolone (KENALOG) 0.1 % external ointment Apply sparingly to affected area three times daily for 14 days. 80 g 0     No Known Allergies      Review of Systems  CONSTITUTIONAL: NEGATIVE for fever, chills, change in weight  INTEGUMENTARY/SKIN: NEGATIVE for worrisome rashes, moles or lesions  EYES: NEGATIVE for vision changes or irritation  ENT/MOUTH: NEGATIVE for ear, mouth and throat problems  RESP: NEGATIVE for significant cough or SOB  CV: NEGATIVE for chest pain, palpitations or peripheral edema  GI: NEGATIVE for nausea, abdominal pain, heartburn, or change in bowel habits  : NEGATIVE for frequency, dysuria, or hematuria  MUSCULOSKELETAL: NEGATIVE for significant arthralgias or myalgia  NEURO: NEGATIVE for weakness, dizziness or paresthesias  ENDOCRINE: NEGATIVE for temperature intolerance, skin/hair changes  HEME: NEGATIVE for bleeding problems  PSYCHIATRIC: NEGATIVE for changes in mood or affect    "  Objective    Exam  /72 (BP Location: Right arm, Patient Position: Sitting, Cuff Size: Adult Regular)   Pulse 61   Temp 97  F (36.1  C) (Tympanic)   Resp 14   Ht 1.76 m (5' 9.29\")   Wt 114.8 kg (253 lb)   SpO2 99%   BMI 37.05 kg/m     Estimated body mass index is 37.05 kg/m  as calculated from the following:    Height as of this encounter: 1.76 m (5' 9.29\").    Weight as of this encounter: 114.8 kg (253 lb).    Physical Exam  GENERAL: alert and no distress  EYES: Eyes grossly normal to inspection, PERRL and conjunctivae and sclerae normal  HENT: ear canals and TM's normal, nose and mouth without ulcers or lesions  NECK: no adenopathy, no asymmetry, masses, or scars  RESP: lungs clear to auscultation - no rales, rhonchi or wheezes  CV: regular rate and rhythm, normal S1 S2, no S3 or S4, no murmur, click or rub, no peripheral edema  ABDOMEN: soft, nontender, no hepatosplenomegaly, no masses and bowel sounds normal  MS: no gross musculoskeletal defects noted, no edema  SKIN: no suspicious lesions or rashes  NEURO: Normal strength and tone, mentation intact and speech normal  PSYCH: mentation appears normal, affect normal/bright  Diabetic foot exam: normal DP and PT pulses, normal sensory exam, tinea pedis, dry cracking heels, and onychomycosis        Signed Electronically by: Inge Vaughan MD    "

## 2024-12-27 NOTE — TELEPHONE ENCOUNTER
Cardiologist: Dr. Gallegos  Surgeon: Dr. Bridges  Surgery: Prostate Bx  Surgery/Procedure should be done in the hospital setting    _____ yes    _____  no    Anesthesia: General  Date: 1/16/2025  Fax# [phone]  # [phone]    Last OV 7/15/2024 w/Lisa    Atrial Flutter  SP ablation Dr. Siu 1/2024  Rate controlled yes   Chadsvasc score is 4 given he had DVT in the past  ASA only after ablation due to lack of arrhythmia on monitor.   Denies any hematuria, epistaxis, melena, excessive bruising, or bleeding.   Atrial Fibrillation CHADSVASC2 Score Stroke Risk:   HTN  Uncontrolled   Unable to take clonidine more than nightly due to severe fatigue. He also has significant bradycardia.   Will stop clonidine  Start nifedipine 30 mg daily  advised low salt diet         Dyslipidemia      Last EKG- 6/11/2024    NM- none on file    Echo- 2/6/2024    Left Ventricle: Low normal left ventricular systolic function with a visually estimated EF of 50 - 55%. Left ventricle size is normal. Inferior wall segments appear slightly hypokinetic when compared to echo from yesterday, possible arrythmia related.    Mitral Valve: Mild regurgitation.    Pericardium: The pericardium is normal.    Cath- 2/6/2024  No evidence of hemodynamically significant coronary artery disease      Aspirin     Covering for provider:  One 3 month refill signed.  Patient will be due to follow-up with cardiology at that time.  Thanks.    Jamar Gagnon MD

## (undated) RX ORDER — ADENOSINE 3 MG/ML
INJECTION, SOLUTION INTRAVENOUS
Status: DISPENSED
Start: 2017-11-14

## (undated) RX ORDER — VERAPAMIL HYDROCHLORIDE 2.5 MG/ML
INJECTION, SOLUTION INTRAVENOUS
Status: DISPENSED
Start: 2017-11-14

## (undated) RX ORDER — HYDRALAZINE HYDROCHLORIDE 20 MG/ML
INJECTION INTRAMUSCULAR; INTRAVENOUS
Status: DISPENSED
Start: 2017-11-14

## (undated) RX ORDER — FENTANYL CITRATE 50 UG/ML
INJECTION, SOLUTION INTRAMUSCULAR; INTRAVENOUS
Status: DISPENSED
Start: 2017-11-14

## (undated) RX ORDER — NITROGLYCERIN 20 MG/100ML
INJECTION INTRAVENOUS
Status: DISPENSED
Start: 2017-11-14